# Patient Record
Sex: FEMALE | Race: WHITE | Employment: FULL TIME | URBAN - METROPOLITAN AREA
[De-identification: names, ages, dates, MRNs, and addresses within clinical notes are randomized per-mention and may not be internally consistent; named-entity substitution may affect disease eponyms.]

---

## 2017-01-01 ENCOUNTER — HOSPITAL ENCOUNTER (OUTPATIENT)
Dept: INFUSION THERAPY | Age: 72
End: 2017-01-01
Payer: COMMERCIAL

## 2017-01-01 ENCOUNTER — HOSPITAL ENCOUNTER (OUTPATIENT)
Dept: INFUSION THERAPY | Age: 72
Discharge: HOME | End: 2017-10-04
Payer: COMMERCIAL

## 2017-01-01 ENCOUNTER — APPOINTMENT (OUTPATIENT)
Dept: INFUSION THERAPY | Age: 72
End: 2017-01-01
Payer: COMMERCIAL

## 2017-01-01 ENCOUNTER — HOME CARE VISIT (OUTPATIENT)
Dept: SCHEDULING | Facility: HOME HEALTH | Age: 72
End: 2017-01-01
Payer: COMMERCIAL

## 2017-01-01 ENCOUNTER — HOME CARE VISIT (OUTPATIENT)
Dept: HOSPICE | Facility: HOSPICE | Age: 72
End: 2017-01-01
Payer: COMMERCIAL

## 2017-01-01 ENCOUNTER — HOSPITAL ENCOUNTER (OUTPATIENT)
Dept: INFUSION THERAPY | Age: 72
Discharge: HOME | End: 2017-03-17
Payer: COMMERCIAL

## 2017-01-01 ENCOUNTER — OFFICE VISIT (OUTPATIENT)
Dept: ONCOLOGY | Age: 72
End: 2017-01-01

## 2017-01-01 ENCOUNTER — DOCUMENTATION ONLY (OUTPATIENT)
Dept: ONCOLOGY | Age: 72
End: 2017-01-01

## 2017-01-01 ENCOUNTER — TELEPHONE (OUTPATIENT)
Dept: ONCOLOGY | Age: 72
End: 2017-01-01

## 2017-01-01 ENCOUNTER — HOSPITAL ENCOUNTER (OUTPATIENT)
Dept: INFUSION THERAPY | Age: 72
Discharge: HOME | End: 2017-02-22
Payer: COMMERCIAL

## 2017-01-01 ENCOUNTER — APPOINTMENT (OUTPATIENT)
Dept: GENERAL RADIOLOGY | Age: 72
DRG: 208 | End: 2017-01-01
Attending: INTERNAL MEDICINE
Payer: COMMERCIAL

## 2017-01-01 ENCOUNTER — HOSPITAL ENCOUNTER (OUTPATIENT)
Dept: INFUSION THERAPY | Age: 72
Discharge: HOME | End: 2017-08-01
Payer: COMMERCIAL

## 2017-01-01 ENCOUNTER — HOSPITAL ENCOUNTER (OUTPATIENT)
Dept: CT IMAGING | Age: 72
Discharge: HOME | End: 2017-01-23
Attending: INTERNAL MEDICINE
Payer: COMMERCIAL

## 2017-01-01 ENCOUNTER — HOSPITAL ENCOUNTER (OUTPATIENT)
Dept: INFUSION THERAPY | Age: 72
Discharge: HOME | End: 2017-04-26
Payer: COMMERCIAL

## 2017-01-01 ENCOUNTER — HOSPITAL ENCOUNTER (OUTPATIENT)
Dept: MRI IMAGING | Age: 72
Discharge: HOME | End: 2017-05-05
Attending: INTERNAL MEDICINE
Payer: COMMERCIAL

## 2017-01-01 ENCOUNTER — HOSPITAL ENCOUNTER (OUTPATIENT)
Dept: INFUSION THERAPY | Age: 72
Discharge: HOME | End: 2017-01-27
Payer: COMMERCIAL

## 2017-01-01 ENCOUNTER — ANESTHESIA EVENT (OUTPATIENT)
Dept: SURGERY | Age: 72
DRG: 208 | End: 2017-01-01
Payer: COMMERCIAL

## 2017-01-01 ENCOUNTER — HOSPITAL ENCOUNTER (OUTPATIENT)
Dept: INFUSION THERAPY | Age: 72
Discharge: HOME | End: 2017-08-21
Payer: COMMERCIAL

## 2017-01-01 ENCOUNTER — HOSPITAL ENCOUNTER (OUTPATIENT)
Dept: MRI IMAGING | Age: 72
Discharge: HOME | End: 2017-07-14
Attending: INTERNAL MEDICINE
Payer: COMMERCIAL

## 2017-01-01 ENCOUNTER — HOSPITAL ENCOUNTER (OUTPATIENT)
Dept: INFUSION THERAPY | Age: 72
Discharge: HOME | End: 2017-08-29
Payer: COMMERCIAL

## 2017-01-01 ENCOUNTER — HOSPITAL ENCOUNTER (OUTPATIENT)
Dept: INFUSION THERAPY | Age: 72
Discharge: HOME | End: 2017-05-24
Payer: COMMERCIAL

## 2017-01-01 ENCOUNTER — APPOINTMENT (OUTPATIENT)
Dept: GENERAL RADIOLOGY | Age: 72
DRG: 208 | End: 2017-01-01
Attending: EMERGENCY MEDICINE
Payer: COMMERCIAL

## 2017-01-01 ENCOUNTER — ANESTHESIA (OUTPATIENT)
Dept: SURGERY | Age: 72
DRG: 208 | End: 2017-01-01
Payer: COMMERCIAL

## 2017-01-01 ENCOUNTER — HOSPITAL ENCOUNTER (OUTPATIENT)
Dept: INFUSION THERAPY | Age: 72
Discharge: HOME | End: 2017-04-28
Payer: COMMERCIAL

## 2017-01-01 ENCOUNTER — APPOINTMENT (OUTPATIENT)
Dept: MRI IMAGING | Age: 72
DRG: 208 | End: 2017-01-01
Attending: OTOLARYNGOLOGY
Payer: COMMERCIAL

## 2017-01-01 ENCOUNTER — HOSPITAL ENCOUNTER (OUTPATIENT)
Dept: VASCULAR SURGERY | Age: 72
Discharge: HOME | End: 2017-05-05
Attending: INTERNAL MEDICINE
Payer: COMMERCIAL

## 2017-01-01 ENCOUNTER — HOSPITAL ENCOUNTER (OUTPATIENT)
Dept: INFUSION THERAPY | Age: 72
Discharge: HOME | End: 2017-03-24
Payer: COMMERCIAL

## 2017-01-01 ENCOUNTER — HOSPITAL ENCOUNTER (OUTPATIENT)
Dept: INFUSION THERAPY | Age: 72
Discharge: HOME | End: 2017-06-06
Payer: COMMERCIAL

## 2017-01-01 ENCOUNTER — HOSPITAL ENCOUNTER (OUTPATIENT)
Dept: INFUSION THERAPY | Age: 72
Discharge: HOME | End: 2017-02-10
Payer: COMMERCIAL

## 2017-01-01 ENCOUNTER — HOSPITAL ENCOUNTER (OUTPATIENT)
Dept: INFUSION THERAPY | Age: 72
Discharge: HOME | End: 2017-02-08
Payer: COMMERCIAL

## 2017-01-01 ENCOUNTER — HOSPITAL ENCOUNTER (OUTPATIENT)
Dept: INFUSION THERAPY | Age: 72
Discharge: HOME | End: 2017-06-14
Payer: COMMERCIAL

## 2017-01-01 ENCOUNTER — HOSPITAL ENCOUNTER (OUTPATIENT)
Dept: INFUSION THERAPY | Age: 72
Discharge: HOME | End: 2017-03-08
Payer: COMMERCIAL

## 2017-01-01 ENCOUNTER — HOSPITAL ENCOUNTER (INPATIENT)
Age: 72
LOS: 1 days | DRG: 951 | End: 2017-10-12
Attending: INTERNAL MEDICINE | Admitting: INTERNAL MEDICINE
Payer: OTHER MISCELLANEOUS

## 2017-01-01 ENCOUNTER — HOSPITAL ENCOUNTER (OUTPATIENT)
Dept: INFUSION THERAPY | Age: 72
Discharge: HOME | End: 2017-05-12
Payer: COMMERCIAL

## 2017-01-01 ENCOUNTER — HOSPITAL ENCOUNTER (OUTPATIENT)
Dept: INFUSION THERAPY | Age: 72
Discharge: HOME | End: 2017-04-05
Payer: COMMERCIAL

## 2017-01-01 ENCOUNTER — APPOINTMENT (OUTPATIENT)
Dept: INFUSION THERAPY | Age: 72
End: 2017-01-01

## 2017-01-01 ENCOUNTER — HOSPITAL ENCOUNTER (OUTPATIENT)
Dept: INFUSION THERAPY | Age: 72
Discharge: HOME | End: 2017-02-24
Payer: COMMERCIAL

## 2017-01-01 ENCOUNTER — HOSPITAL ENCOUNTER (OUTPATIENT)
Dept: INFUSION THERAPY | Age: 72
Discharge: HOME | End: 2017-01-11
Payer: COMMERCIAL

## 2017-01-01 ENCOUNTER — HOSPITAL ENCOUNTER (OUTPATIENT)
Dept: INFUSION THERAPY | Age: 72
Discharge: HOME | End: 2017-01-25
Payer: COMMERCIAL

## 2017-01-01 ENCOUNTER — HOSPITAL ENCOUNTER (OUTPATIENT)
Age: 72
Setting detail: OBSERVATION
Discharge: HOME | End: 2017-03-15
Attending: EMERGENCY MEDICINE | Admitting: INTERNAL MEDICINE
Payer: COMMERCIAL

## 2017-01-01 ENCOUNTER — HOSPITAL ENCOUNTER (INPATIENT)
Age: 72
LOS: 5 days | Discharge: HOME | DRG: 208 | End: 2017-04-10
Attending: EMERGENCY MEDICINE | Admitting: HOSPITALIST
Payer: COMMERCIAL

## 2017-01-01 ENCOUNTER — HOSPITAL ENCOUNTER (OUTPATIENT)
Dept: INFUSION THERAPY | Age: 72
Discharge: HOME | End: 2017-03-22
Payer: COMMERCIAL

## 2017-01-01 ENCOUNTER — HOSPITAL ENCOUNTER (OUTPATIENT)
Dept: INFUSION THERAPY | Age: 72
Discharge: HOME | End: 2017-05-10
Payer: COMMERCIAL

## 2017-01-01 ENCOUNTER — HOSPITAL ENCOUNTER (OUTPATIENT)
Dept: INFUSION THERAPY | Age: 72
Discharge: HOME | End: 2017-01-13
Payer: COMMERCIAL

## 2017-01-01 ENCOUNTER — HOSPICE ADMISSION (OUTPATIENT)
Dept: HOSPICE | Facility: HOSPICE | Age: 72
End: 2017-01-01
Payer: COMMERCIAL

## 2017-01-01 ENCOUNTER — HOSPITAL ENCOUNTER (OUTPATIENT)
Dept: INFUSION THERAPY | Age: 72
Discharge: HOME | End: 2017-03-15
Payer: COMMERCIAL

## 2017-01-01 VITALS
DIASTOLIC BLOOD PRESSURE: 70 MMHG | TEMPERATURE: 98.3 F | SYSTOLIC BLOOD PRESSURE: 112 MMHG | RESPIRATION RATE: 18 BRPM | HEART RATE: 85 BPM

## 2017-01-01 VITALS
SYSTOLIC BLOOD PRESSURE: 113 MMHG | DIASTOLIC BLOOD PRESSURE: 70 MMHG | HEIGHT: 65 IN | BODY MASS INDEX: 24.99 KG/M2 | WEIGHT: 150 LBS | HEART RATE: 89 BPM | RESPIRATION RATE: 18 BRPM | OXYGEN SATURATION: 94 % | TEMPERATURE: 98 F

## 2017-01-01 VITALS
HEIGHT: 65 IN | HEART RATE: 68 BPM | DIASTOLIC BLOOD PRESSURE: 76 MMHG | BODY MASS INDEX: 27.66 KG/M2 | WEIGHT: 166 LBS | TEMPERATURE: 97.5 F | RESPIRATION RATE: 18 BRPM | OXYGEN SATURATION: 98 % | SYSTOLIC BLOOD PRESSURE: 123 MMHG

## 2017-01-01 VITALS
DIASTOLIC BLOOD PRESSURE: 68 MMHG | HEIGHT: 65 IN | BODY MASS INDEX: 24.49 KG/M2 | RESPIRATION RATE: 16 BRPM | SYSTOLIC BLOOD PRESSURE: 112 MMHG | OXYGEN SATURATION: 98 % | WEIGHT: 147 LBS | HEART RATE: 92 BPM

## 2017-01-01 VITALS
HEART RATE: 90 BPM | RESPIRATION RATE: 18 BRPM | SYSTOLIC BLOOD PRESSURE: 119 MMHG | HEART RATE: 87 BPM | DIASTOLIC BLOOD PRESSURE: 78 MMHG | DIASTOLIC BLOOD PRESSURE: 59 MMHG | OXYGEN SATURATION: 97 % | SYSTOLIC BLOOD PRESSURE: 101 MMHG | TEMPERATURE: 97.4 F | RESPIRATION RATE: 18 BRPM | TEMPERATURE: 97.6 F | OXYGEN SATURATION: 98 %

## 2017-01-01 VITALS
SYSTOLIC BLOOD PRESSURE: 114 MMHG | HEART RATE: 74 BPM | WEIGHT: 160.1 LBS | DIASTOLIC BLOOD PRESSURE: 82 MMHG | BODY MASS INDEX: 28.16 KG/M2 | DIASTOLIC BLOOD PRESSURE: 76 MMHG | BODY MASS INDEX: 26.64 KG/M2 | OXYGEN SATURATION: 96 % | SYSTOLIC BLOOD PRESSURE: 126 MMHG | WEIGHT: 169 LBS | OXYGEN SATURATION: 99 % | HEIGHT: 65 IN | RESPIRATION RATE: 18 BRPM | TEMPERATURE: 97.4 F | HEART RATE: 82 BPM | TEMPERATURE: 98 F | RESPIRATION RATE: 18 BRPM

## 2017-01-01 VITALS
WEIGHT: 163.6 LBS | HEIGHT: 65 IN | TEMPERATURE: 97.6 F | RESPIRATION RATE: 18 BRPM | OXYGEN SATURATION: 99 % | BODY MASS INDEX: 27.26 KG/M2 | DIASTOLIC BLOOD PRESSURE: 72 MMHG | HEART RATE: 84 BPM | SYSTOLIC BLOOD PRESSURE: 111 MMHG

## 2017-01-01 VITALS
HEART RATE: 90 BPM | OXYGEN SATURATION: 98 % | TEMPERATURE: 97.3 F | RESPIRATION RATE: 18 BRPM | SYSTOLIC BLOOD PRESSURE: 108 MMHG | DIASTOLIC BLOOD PRESSURE: 42 MMHG

## 2017-01-01 VITALS
HEIGHT: 65 IN | HEART RATE: 93 BPM | DIASTOLIC BLOOD PRESSURE: 86 MMHG | BODY MASS INDEX: 28.07 KG/M2 | WEIGHT: 168.5 LBS | RESPIRATION RATE: 18 BRPM | SYSTOLIC BLOOD PRESSURE: 143 MMHG

## 2017-01-01 VITALS
HEART RATE: 90 BPM | WEIGHT: 163.4 LBS | BODY MASS INDEX: 27.22 KG/M2 | DIASTOLIC BLOOD PRESSURE: 70 MMHG | HEIGHT: 65 IN | OXYGEN SATURATION: 94 % | SYSTOLIC BLOOD PRESSURE: 106 MMHG | TEMPERATURE: 98.1 F

## 2017-01-01 VITALS
DIASTOLIC BLOOD PRESSURE: 77 MMHG | TEMPERATURE: 97.1 F | RESPIRATION RATE: 18 BRPM | HEART RATE: 95 BPM | SYSTOLIC BLOOD PRESSURE: 119 MMHG

## 2017-01-01 VITALS
OXYGEN SATURATION: 97 % | HEIGHT: 65 IN | DIASTOLIC BLOOD PRESSURE: 70 MMHG | RESPIRATION RATE: 18 BRPM | TEMPERATURE: 98.1 F | HEART RATE: 70 BPM | WEIGHT: 155.6 LBS | BODY MASS INDEX: 25.92 KG/M2 | SYSTOLIC BLOOD PRESSURE: 110 MMHG

## 2017-01-01 VITALS
HEIGHT: 65 IN | OXYGEN SATURATION: 98 % | RESPIRATION RATE: 18 BRPM | BODY MASS INDEX: 26.16 KG/M2 | TEMPERATURE: 97.9 F | DIASTOLIC BLOOD PRESSURE: 79 MMHG | HEART RATE: 81 BPM | SYSTOLIC BLOOD PRESSURE: 121 MMHG | WEIGHT: 157 LBS

## 2017-01-01 VITALS
OXYGEN SATURATION: 97 % | HEART RATE: 74 BPM | TEMPERATURE: 98.4 F | RESPIRATION RATE: 18 BRPM | DIASTOLIC BLOOD PRESSURE: 62 MMHG | SYSTOLIC BLOOD PRESSURE: 98 MMHG

## 2017-01-01 VITALS
DIASTOLIC BLOOD PRESSURE: 86 MMHG | WEIGHT: 162.5 LBS | HEIGHT: 65 IN | OXYGEN SATURATION: 96 % | HEART RATE: 104 BPM | RESPIRATION RATE: 18 BRPM | BODY MASS INDEX: 27.07 KG/M2 | TEMPERATURE: 97.1 F | SYSTOLIC BLOOD PRESSURE: 132 MMHG

## 2017-01-01 VITALS
HEIGHT: 64 IN | HEART RATE: 71 BPM | RESPIRATION RATE: 18 BRPM | SYSTOLIC BLOOD PRESSURE: 124 MMHG | BODY MASS INDEX: 28.85 KG/M2 | DIASTOLIC BLOOD PRESSURE: 83 MMHG | TEMPERATURE: 97.8 F | WEIGHT: 169 LBS | OXYGEN SATURATION: 98 %

## 2017-01-01 VITALS
HEART RATE: 101 BPM | WEIGHT: 165.7 LBS | SYSTOLIC BLOOD PRESSURE: 126 MMHG | BODY MASS INDEX: 27.61 KG/M2 | RESPIRATION RATE: 18 BRPM | OXYGEN SATURATION: 96 % | DIASTOLIC BLOOD PRESSURE: 73 MMHG | HEIGHT: 65 IN | TEMPERATURE: 97 F

## 2017-01-01 VITALS
OXYGEN SATURATION: 94 % | HEART RATE: 74 BPM | SYSTOLIC BLOOD PRESSURE: 100 MMHG | DIASTOLIC BLOOD PRESSURE: 64 MMHG | RESPIRATION RATE: 18 BRPM

## 2017-01-01 VITALS
OXYGEN SATURATION: 95 % | DIASTOLIC BLOOD PRESSURE: 62 MMHG | HEART RATE: 93 BPM | SYSTOLIC BLOOD PRESSURE: 102 MMHG | RESPIRATION RATE: 18 BRPM

## 2017-01-01 VITALS
DIASTOLIC BLOOD PRESSURE: 70 MMHG | TEMPERATURE: 97.2 F | RESPIRATION RATE: 18 BRPM | SYSTOLIC BLOOD PRESSURE: 103 MMHG | OXYGEN SATURATION: 98 % | HEART RATE: 70 BPM

## 2017-01-01 VITALS
DIASTOLIC BLOOD PRESSURE: 78 MMHG | SYSTOLIC BLOOD PRESSURE: 115 MMHG | BODY MASS INDEX: 26.16 KG/M2 | OXYGEN SATURATION: 97 % | RESPIRATION RATE: 18 BRPM | WEIGHT: 157 LBS | HEIGHT: 65 IN | HEART RATE: 72 BPM | TEMPERATURE: 97.6 F

## 2017-01-01 VITALS
OXYGEN SATURATION: 97 % | HEART RATE: 71 BPM | SYSTOLIC BLOOD PRESSURE: 108 MMHG | DIASTOLIC BLOOD PRESSURE: 64 MMHG | RESPIRATION RATE: 18 BRPM | TEMPERATURE: 97.3 F

## 2017-01-01 VITALS
RESPIRATION RATE: 16 BRPM | SYSTOLIC BLOOD PRESSURE: 106 MMHG | TEMPERATURE: 97.7 F | OXYGEN SATURATION: 96 % | HEART RATE: 101 BPM | DIASTOLIC BLOOD PRESSURE: 68 MMHG

## 2017-01-01 VITALS
RESPIRATION RATE: 18 BRPM | SYSTOLIC BLOOD PRESSURE: 105 MMHG | TEMPERATURE: 96.9 F | DIASTOLIC BLOOD PRESSURE: 65 MMHG | HEART RATE: 65 BPM

## 2017-01-01 VITALS
OXYGEN SATURATION: 97 % | HEART RATE: 77 BPM | BODY MASS INDEX: 27.51 KG/M2 | RESPIRATION RATE: 18 BRPM | DIASTOLIC BLOOD PRESSURE: 72 MMHG | TEMPERATURE: 97.6 F | SYSTOLIC BLOOD PRESSURE: 112 MMHG | WEIGHT: 165.34 LBS

## 2017-01-01 VITALS
TEMPERATURE: 98.7 F | OXYGEN SATURATION: 97 % | HEIGHT: 65 IN | WEIGHT: 147.4 LBS | SYSTOLIC BLOOD PRESSURE: 113 MMHG | BODY MASS INDEX: 24.56 KG/M2 | HEART RATE: 88 BPM | RESPIRATION RATE: 18 BRPM | DIASTOLIC BLOOD PRESSURE: 76 MMHG

## 2017-01-01 VITALS
OXYGEN SATURATION: 96 % | WEIGHT: 155.6 LBS | BODY MASS INDEX: 25.92 KG/M2 | TEMPERATURE: 97.9 F | DIASTOLIC BLOOD PRESSURE: 76 MMHG | HEIGHT: 65 IN | RESPIRATION RATE: 18 BRPM | SYSTOLIC BLOOD PRESSURE: 114 MMHG

## 2017-01-01 VITALS
DIASTOLIC BLOOD PRESSURE: 53 MMHG | TEMPERATURE: 97.2 F | RESPIRATION RATE: 20 BRPM | HEART RATE: 91 BPM | SYSTOLIC BLOOD PRESSURE: 100 MMHG

## 2017-01-01 VITALS
SYSTOLIC BLOOD PRESSURE: 123 MMHG | DIASTOLIC BLOOD PRESSURE: 74 MMHG | RESPIRATION RATE: 20 BRPM | HEART RATE: 80 BPM | OXYGEN SATURATION: 98 %

## 2017-01-01 VITALS
HEART RATE: 72 BPM | SYSTOLIC BLOOD PRESSURE: 126 MMHG | OXYGEN SATURATION: 95 % | RESPIRATION RATE: 16 BRPM | DIASTOLIC BLOOD PRESSURE: 60 MMHG

## 2017-01-01 VITALS
RESPIRATION RATE: 18 BRPM | WEIGHT: 148.8 LBS | BODY MASS INDEX: 24.79 KG/M2 | HEART RATE: 91 BPM | TEMPERATURE: 97.6 F | OXYGEN SATURATION: 98 % | SYSTOLIC BLOOD PRESSURE: 117 MMHG | DIASTOLIC BLOOD PRESSURE: 76 MMHG | HEIGHT: 65 IN

## 2017-01-01 VITALS
DIASTOLIC BLOOD PRESSURE: 103 MMHG | SYSTOLIC BLOOD PRESSURE: 180 MMHG | BODY MASS INDEX: 26.33 KG/M2 | HEART RATE: 105 BPM | WEIGHT: 158 LBS | TEMPERATURE: 97.8 F | OXYGEN SATURATION: 92 % | HEIGHT: 65 IN | RESPIRATION RATE: 22 BRPM

## 2017-01-01 VITALS
RESPIRATION RATE: 16 BRPM | BODY MASS INDEX: 25.22 KG/M2 | OXYGEN SATURATION: 96 % | HEIGHT: 65 IN | SYSTOLIC BLOOD PRESSURE: 109 MMHG | WEIGHT: 151.4 LBS | TEMPERATURE: 98.5 F | DIASTOLIC BLOOD PRESSURE: 73 MMHG | HEART RATE: 78 BPM

## 2017-01-01 VITALS
SYSTOLIC BLOOD PRESSURE: 110 MMHG | OXYGEN SATURATION: 99 % | RESPIRATION RATE: 20 BRPM | HEART RATE: 88 BPM | DIASTOLIC BLOOD PRESSURE: 62 MMHG

## 2017-01-01 VITALS
OXYGEN SATURATION: 97 % | BODY MASS INDEX: 26.87 KG/M2 | RESPIRATION RATE: 18 BRPM | HEIGHT: 64 IN | WEIGHT: 157.41 LBS | TEMPERATURE: 98.2 F | DIASTOLIC BLOOD PRESSURE: 60 MMHG | SYSTOLIC BLOOD PRESSURE: 102 MMHG | HEART RATE: 70 BPM

## 2017-01-01 VITALS
TEMPERATURE: 97.4 F | RESPIRATION RATE: 20 BRPM | DIASTOLIC BLOOD PRESSURE: 70 MMHG | SYSTOLIC BLOOD PRESSURE: 99 MMHG | HEART RATE: 98 BPM

## 2017-01-01 VITALS
WEIGHT: 160 LBS | HEIGHT: 65 IN | BODY MASS INDEX: 26.66 KG/M2 | HEART RATE: 77 BPM | OXYGEN SATURATION: 98 % | DIASTOLIC BLOOD PRESSURE: 84 MMHG | RESPIRATION RATE: 18 BRPM | SYSTOLIC BLOOD PRESSURE: 136 MMHG | TEMPERATURE: 98 F

## 2017-01-01 VITALS
RESPIRATION RATE: 16 BRPM | HEIGHT: 65 IN | SYSTOLIC BLOOD PRESSURE: 103 MMHG | WEIGHT: 149 LBS | OXYGEN SATURATION: 99 % | TEMPERATURE: 98.3 F | HEART RATE: 76 BPM | DIASTOLIC BLOOD PRESSURE: 71 MMHG | BODY MASS INDEX: 24.83 KG/M2

## 2017-01-01 VITALS
HEART RATE: 71 BPM | DIASTOLIC BLOOD PRESSURE: 72 MMHG | SYSTOLIC BLOOD PRESSURE: 112 MMHG | RESPIRATION RATE: 18 BRPM | TEMPERATURE: 96.8 F

## 2017-01-01 VITALS
TEMPERATURE: 97.5 F | HEART RATE: 75 BPM | DIASTOLIC BLOOD PRESSURE: 73 MMHG | OXYGEN SATURATION: 98 % | RESPIRATION RATE: 18 BRPM | SYSTOLIC BLOOD PRESSURE: 112 MMHG

## 2017-01-01 VITALS
HEIGHT: 65 IN | WEIGHT: 165 LBS | OXYGEN SATURATION: 97 % | DIASTOLIC BLOOD PRESSURE: 81 MMHG | BODY MASS INDEX: 27.49 KG/M2 | HEART RATE: 72 BPM | SYSTOLIC BLOOD PRESSURE: 116 MMHG | TEMPERATURE: 98.2 F | RESPIRATION RATE: 18 BRPM

## 2017-01-01 VITALS
DIASTOLIC BLOOD PRESSURE: 58 MMHG | SYSTOLIC BLOOD PRESSURE: 98 MMHG | OXYGEN SATURATION: 95 % | RESPIRATION RATE: 15 BRPM | TEMPERATURE: 97.7 F | HEART RATE: 89 BPM

## 2017-01-01 VITALS
SYSTOLIC BLOOD PRESSURE: 115 MMHG | RESPIRATION RATE: 18 BRPM | OXYGEN SATURATION: 97 % | DIASTOLIC BLOOD PRESSURE: 65 MMHG | HEART RATE: 78 BPM

## 2017-01-01 VITALS
DIASTOLIC BLOOD PRESSURE: 77 MMHG | RESPIRATION RATE: 18 BRPM | HEART RATE: 82 BPM | TEMPERATURE: 97.9 F | SYSTOLIC BLOOD PRESSURE: 137 MMHG | OXYGEN SATURATION: 96 %

## 2017-01-01 VITALS
WEIGHT: 169.2 LBS | HEIGHT: 65 IN | HEART RATE: 61 BPM | RESPIRATION RATE: 18 BRPM | TEMPERATURE: 97.7 F | BODY MASS INDEX: 28.19 KG/M2 | OXYGEN SATURATION: 97 % | DIASTOLIC BLOOD PRESSURE: 77 MMHG | SYSTOLIC BLOOD PRESSURE: 121 MMHG

## 2017-01-01 DIAGNOSIS — R10.11 RIGHT UPPER QUADRANT ABDOMINAL PAIN: ICD-10-CM

## 2017-01-01 DIAGNOSIS — R74.8 ELEVATED LIVER ENZYMES: ICD-10-CM

## 2017-01-01 DIAGNOSIS — C78.7 METASTATIC COLON CANCER TO LIVER (HCC): Primary | ICD-10-CM

## 2017-01-01 DIAGNOSIS — R21 FACIAL RASH: ICD-10-CM

## 2017-01-01 DIAGNOSIS — C18.9 METASTATIC COLON CANCER IN FEMALE (HCC): Primary | ICD-10-CM

## 2017-01-01 DIAGNOSIS — R60.0 BILATERAL LOWER EXTREMITY EDEMA: ICD-10-CM

## 2017-01-01 DIAGNOSIS — E87.6 HYPOKALEMIA: ICD-10-CM

## 2017-01-01 DIAGNOSIS — T45.1X5A CHEMOTHERAPY INDUCED NEUTROPENIA (HCC): ICD-10-CM

## 2017-01-01 DIAGNOSIS — L27.0 DRUG-INDUCED SKIN RASH: ICD-10-CM

## 2017-01-01 DIAGNOSIS — C18.9 METASTATIC COLON CANCER IN FEMALE (HCC): ICD-10-CM

## 2017-01-01 DIAGNOSIS — K72.00 ACUTE LIVER FAILURE WITHOUT HEPATIC COMA: ICD-10-CM

## 2017-01-01 DIAGNOSIS — R06.1 INSPIRATORY STRIDOR: ICD-10-CM

## 2017-01-01 DIAGNOSIS — F41.9 ANXIETY: ICD-10-CM

## 2017-01-01 DIAGNOSIS — T78.2XXA ANAPHYLACTIC REACTION, INITIAL ENCOUNTER: Primary | ICD-10-CM

## 2017-01-01 DIAGNOSIS — C18.9 MALIGNANT NEOPLASM OF COLON, UNSPECIFIED PART OF COLON (HCC): Primary | ICD-10-CM

## 2017-01-01 DIAGNOSIS — R10.11 RUQ PAIN: ICD-10-CM

## 2017-01-01 DIAGNOSIS — C18.9 METASTATIC COLON CANCER TO LIVER (HCC): Primary | ICD-10-CM

## 2017-01-01 DIAGNOSIS — L03.211 CELLULITIS OF FACE: ICD-10-CM

## 2017-01-01 DIAGNOSIS — D70.1 CHEMOTHERAPY INDUCED NEUTROPENIA (HCC): ICD-10-CM

## 2017-01-01 DIAGNOSIS — R09.81 NASAL CONGESTION: ICD-10-CM

## 2017-01-01 DIAGNOSIS — R21 RASH AND OTHER NONSPECIFIC SKIN ERUPTION: ICD-10-CM

## 2017-01-01 DIAGNOSIS — T78.40XA ALLERGIC REACTION, INITIAL ENCOUNTER: Primary | ICD-10-CM

## 2017-01-01 DIAGNOSIS — N39.0 URINARY TRACT INFECTION WITHOUT HEMATURIA, SITE UNSPECIFIED: Primary | ICD-10-CM

## 2017-01-01 LAB
ABO + RH BLD: NORMAL
ALBUMIN SERPL BCP-MCNC: 2.9 G/DL (ref 3.5–5)
ALBUMIN SERPL BCP-MCNC: 2.9 G/DL (ref 3.5–5)
ALBUMIN SERPL BCP-MCNC: 3.1 G/DL (ref 3.5–5)
ALBUMIN SERPL BCP-MCNC: 3.2 G/DL (ref 3.5–5)
ALBUMIN SERPL BCP-MCNC: 3.3 G/DL (ref 3.5–5)
ALBUMIN SERPL BCP-MCNC: 3.4 G/DL (ref 3.5–5)
ALBUMIN SERPL BCP-MCNC: 3.5 G/DL (ref 3.5–5)
ALBUMIN SERPL BCP-MCNC: 3.7 G/DL (ref 3.5–5)
ALBUMIN SERPL-MCNC: 3.1 G/DL (ref 3.5–5)
ALBUMIN SERPL-MCNC: 3.4 G/DL (ref 3.5–5)
ALBUMIN SERPL-MCNC: 4.2 G/DL (ref 3.5–4.8)
ALBUMIN/GLOB SERPL: 0.6 {RATIO} (ref 1.1–2.2)
ALBUMIN/GLOB SERPL: 0.7 {RATIO} (ref 1.1–2.2)
ALBUMIN/GLOB SERPL: 0.8 {RATIO} (ref 1.1–2.2)
ALBUMIN/GLOB SERPL: 0.9 {RATIO} (ref 1.1–2.2)
ALBUMIN/GLOB SERPL: 1.3 {RATIO} (ref 1.2–2.2)
ALP SERPL-CCNC: 137 U/L (ref 45–117)
ALP SERPL-CCNC: 139 U/L (ref 45–117)
ALP SERPL-CCNC: 152 U/L (ref 45–117)
ALP SERPL-CCNC: 165 U/L (ref 45–117)
ALP SERPL-CCNC: 172 U/L (ref 45–117)
ALP SERPL-CCNC: 182 U/L (ref 45–117)
ALP SERPL-CCNC: 245 IU/L (ref 39–117)
ALP SERPL-CCNC: 423 U/L (ref 45–117)
ALP SERPL-CCNC: 503 U/L (ref 45–117)
ALP SERPL-CCNC: 629 U/L (ref 45–117)
ALP SERPL-CCNC: 688 U/L (ref 45–117)
ALT SERPL-CCNC: 118 U/L (ref 12–78)
ALT SERPL-CCNC: 146 U/L (ref 12–78)
ALT SERPL-CCNC: 157 U/L (ref 12–78)
ALT SERPL-CCNC: 24 IU/L (ref 0–32)
ALT SERPL-CCNC: 24 U/L (ref 12–78)
ALT SERPL-CCNC: 25 U/L (ref 12–78)
ALT SERPL-CCNC: 26 U/L (ref 12–78)
ALT SERPL-CCNC: 28 U/L (ref 12–78)
ALT SERPL-CCNC: 34 U/L (ref 12–78)
ALT SERPL-CCNC: 44 U/L (ref 12–78)
ALT SERPL-CCNC: 52 U/L (ref 12–78)
ANION GAP BLD CALC-SCNC: 10 MMOL/L (ref 5–15)
ANION GAP BLD CALC-SCNC: 10 MMOL/L (ref 5–15)
ANION GAP BLD CALC-SCNC: 11 MMOL/L (ref 5–15)
ANION GAP BLD CALC-SCNC: 11 MMOL/L (ref 5–15)
ANION GAP BLD CALC-SCNC: 12 MMOL/L (ref 5–15)
ANION GAP BLD CALC-SCNC: 12 MMOL/L (ref 5–15)
ANION GAP BLD CALC-SCNC: 14 MMOL/L (ref 5–15)
ANION GAP BLD CALC-SCNC: 17 MMOL/L (ref 5–15)
ANION GAP BLD CALC-SCNC: 18 MMOL/L (ref 5–15)
ANION GAP BLD CALC-SCNC: 19 MMOL/L (ref 5–15)
ANION GAP BLD CALC-SCNC: 19 MMOL/L (ref 5–15)
ANION GAP BLD CALC-SCNC: 4 MMOL/L (ref 5–15)
ANION GAP BLD CALC-SCNC: 6 MMOL/L (ref 5–15)
ANION GAP BLD CALC-SCNC: 6 MMOL/L (ref 5–15)
ANION GAP BLD CALC-SCNC: 8 MMOL/L (ref 5–15)
ANION GAP BLD CALC-SCNC: 9 MMOL/L (ref 5–15)
ANION GAP BLD CALC-SCNC: 9 MMOL/L (ref 5–15)
ANION GAP SERPL CALC-SCNC: 4 MMOL/L (ref 5–15)
ANION GAP SERPL CALC-SCNC: 4 MMOL/L (ref 5–15)
APPEARANCE UR: ABNORMAL
APPEARANCE UR: CLEAR
ARTERIAL PATENCY WRIST A: ABNORMAL
ARTERIAL PATENCY WRIST A: YES
AST SERPL W P-5'-P-CCNC: 24 U/L (ref 15–37)
AST SERPL W P-5'-P-CCNC: 25 U/L (ref 15–37)
AST SERPL W P-5'-P-CCNC: 26 U/L (ref 15–37)
AST SERPL W P-5'-P-CCNC: 27 U/L (ref 15–37)
AST SERPL W P-5'-P-CCNC: 35 U/L (ref 15–37)
AST SERPL W P-5'-P-CCNC: 62 U/L (ref 15–37)
AST SERPL W P-5'-P-CCNC: 66 U/L (ref 15–37)
AST SERPL W P-5'-P-CCNC: 81 U/L (ref 15–37)
AST SERPL-CCNC: 118 U/L (ref 15–37)
AST SERPL-CCNC: 129 U/L (ref 15–37)
AST SERPL-CCNC: 33 IU/L (ref 0–40)
ATRIAL RATE: 98 BPM
BACTERIA SPEC CULT: NORMAL
BACTERIA URNS QL MICRO: ABNORMAL /HPF
BACTERIA URNS QL MICRO: ABNORMAL /HPF
BACTERIA URNS QL MICRO: NEGATIVE /HPF
BACTERIA URNS QL MICRO: NEGATIVE /HPF
BASE DEFICIT BLDA-SCNC: 0.4 MMOL/L
BASE EXCESS BLDA CALC-SCNC: 1.7 MMOL/L
BASE EXCESS BLDA CALC-SCNC: 3.1 MMOL/L
BASE EXCESS BLDA CALC-SCNC: 5.3 MMOL/L
BASE EXCESS BLDA CALC-SCNC: 6.6 MMOL/L
BASO+EOS+MONOS # BLD AUTO: 0.5 K/UL (ref 0.2–1.2)
BASO+EOS+MONOS # BLD AUTO: 0.5 K/UL (ref 0.2–1.2)
BASO+EOS+MONOS # BLD AUTO: 0.6 K/UL (ref 0.2–1.2)
BASO+EOS+MONOS # BLD AUTO: 0.7 K/UL (ref 0.2–1.2)
BASO+EOS+MONOS # BLD AUTO: 0.7 K/UL (ref 0.2–1.2)
BASO+EOS+MONOS # BLD AUTO: 0.8 K/UL (ref 0.2–1.2)
BASO+EOS+MONOS # BLD AUTO: 0.8 K/UL (ref 0.2–1.2)
BASO+EOS+MONOS # BLD AUTO: 11 % (ref 3.2–16.9)
BASO+EOS+MONOS # BLD AUTO: 12 % (ref 3.2–16.9)
BASO+EOS+MONOS # BLD AUTO: 13 % (ref 3.2–16.9)
BASO+EOS+MONOS # BLD AUTO: 13 % (ref 3.2–16.9)
BASO+EOS+MONOS # BLD AUTO: 15 % (ref 3.2–16.9)
BASO+EOS+MONOS # BLD AUTO: 8 % (ref 3.2–16.9)
BASO+EOS+MONOS # BLD AUTO: 9 % (ref 3.2–16.9)
BASOPHILS # BLD AUTO: 0 K/UL
BASOPHILS # BLD AUTO: 0 K/UL (ref 0–0.1)
BASOPHILS # BLD AUTO: 0 X10E3/UL (ref 0–0.2)
BASOPHILS # BLD AUTO: 0.1 K/UL (ref 0–0.1)
BASOPHILS # BLD: 0 %
BASOPHILS # BLD: 0 % (ref 0–1)
BASOPHILS # BLD: 0 K/UL (ref 0–0.1)
BASOPHILS # BLD: 2 % (ref 0–1)
BASOPHILS NFR BLD AUTO: 0 %
BASOPHILS NFR BLD: 0 % (ref 0–1)
BDY SITE: ABNORMAL
BILIRUB DIRECT SERPL-MCNC: <0.1 MG/DL (ref 0–0.2)
BILIRUB DIRECT SERPL-MCNC: <0.1 MG/DL (ref 0–0.2)
BILIRUB SERPL-MCNC: 0.4 MG/DL (ref 0.2–1)
BILIRUB SERPL-MCNC: 0.4 MG/DL (ref 0–1.2)
BILIRUB SERPL-MCNC: 0.5 MG/DL (ref 0.2–1)
BILIRUB SERPL-MCNC: 0.5 MG/DL (ref 0.2–1)
BILIRUB SERPL-MCNC: 0.8 MG/DL (ref 0.2–1)
BILIRUB SERPL-MCNC: 0.9 MG/DL (ref 0.2–1)
BILIRUB SERPL-MCNC: 3.9 MG/DL (ref 0.2–1)
BILIRUB SERPL-MCNC: 8 MG/DL (ref 0.2–1)
BILIRUB UR QL CFM: NEGATIVE
BILIRUB UR QL: NEGATIVE
BLOOD GROUP ANTIBODIES SERPL: NORMAL
BREATHS.SPONTANEOUS ON VENT: 15
BUN BLD-MCNC: 13 MG/DL (ref 9–20)
BUN BLD-MCNC: 8 MG/DL (ref 9–20)
BUN BLD-MCNC: <3 MG/DL (ref 9–20)
BUN BLD-MCNC: <3 MG/DL (ref 9–20)
BUN SERPL-MCNC: 10 MG/DL (ref 6–20)
BUN SERPL-MCNC: 11 MG/DL (ref 6–20)
BUN SERPL-MCNC: 3 MG/DL (ref 6–20)
BUN SERPL-MCNC: 4 MG/DL (ref 6–20)
BUN SERPL-MCNC: 4 MG/DL (ref 6–20)
BUN SERPL-MCNC: 5 MG/DL (ref 6–20)
BUN SERPL-MCNC: 5 MG/DL (ref 6–20)
BUN SERPL-MCNC: 6 MG/DL (ref 6–20)
BUN SERPL-MCNC: 7 MG/DL (ref 6–20)
BUN SERPL-MCNC: 8 MG/DL (ref 6–20)
BUN SERPL-MCNC: 8 MG/DL (ref 6–20)
BUN SERPL-MCNC: 9 MG/DL (ref 6–20)
BUN SERPL-MCNC: 9 MG/DL (ref 6–20)
BUN SERPL-MCNC: 9 MG/DL (ref 8–27)
BUN/CREAT SERPL: 11 (ref 12–20)
BUN/CREAT SERPL: 12 (ref 12–20)
BUN/CREAT SERPL: 12 (ref 12–20)
BUN/CREAT SERPL: 13 (ref 12–20)
BUN/CREAT SERPL: 13 (ref 12–28)
BUN/CREAT SERPL: 17 (ref 12–20)
BUN/CREAT SERPL: 19 (ref 12–20)
BUN/CREAT SERPL: 20 (ref 12–20)
BUN/CREAT SERPL: 21 (ref 12–20)
BUN/CREAT SERPL: 22 (ref 12–20)
BUN/CREAT SERPL: 5 (ref 12–20)
BUN/CREAT SERPL: 7 (ref 12–20)
BUN/CREAT SERPL: 8 (ref 12–20)
BUN/CREAT SERPL: 9 (ref 12–20)
CA-I BLD-MCNC: 1.08 MMOL/L (ref 1.12–1.32)
CA-I BLD-MCNC: 1.15 MMOL/L (ref 1.12–1.32)
CA-I BLD-MCNC: 1.18 MMOL/L (ref 1.12–1.32)
CA-I BLD-MCNC: 1.18 MMOL/L (ref 1.12–1.32)
CALCIUM SERPL-MCNC: 7.9 MG/DL (ref 8.5–10.1)
CALCIUM SERPL-MCNC: 8.1 MG/DL (ref 8.5–10.1)
CALCIUM SERPL-MCNC: 8.1 MG/DL (ref 8.5–10.1)
CALCIUM SERPL-MCNC: 8.3 MG/DL (ref 8.5–10.1)
CALCIUM SERPL-MCNC: 8.5 MG/DL (ref 8.5–10.1)
CALCIUM SERPL-MCNC: 8.5 MG/DL (ref 8.5–10.1)
CALCIUM SERPL-MCNC: 8.7 MG/DL (ref 8.5–10.1)
CALCIUM SERPL-MCNC: 8.7 MG/DL (ref 8.5–10.1)
CALCIUM SERPL-MCNC: 8.9 MG/DL (ref 8.5–10.1)
CALCIUM SERPL-MCNC: 9 MG/DL (ref 8.5–10.1)
CALCIUM SERPL-MCNC: 9.1 MG/DL (ref 8.5–10.1)
CALCIUM SERPL-MCNC: 9.2 MG/DL (ref 8.5–10.1)
CALCIUM SERPL-MCNC: 9.5 MG/DL (ref 8.5–10.1)
CALCIUM SERPL-MCNC: 9.6 MG/DL (ref 8.7–10.3)
CALCULATED P AXIS, ECG09: 12 DEGREES
CALCULATED R AXIS, ECG10: 24 DEGREES
CALCULATED T AXIS, ECG11: 20 DEGREES
CC UR VC: NORMAL
CHLORIDE BLD-SCNC: 100 MMOL/L (ref 98–107)
CHLORIDE BLD-SCNC: 101 MMOL/L (ref 98–107)
CHLORIDE BLD-SCNC: 104 MMOL/L (ref 98–107)
CHLORIDE BLD-SCNC: 98 MMOL/L (ref 98–107)
CHLORIDE SERPL-SCNC: 100 MMOL/L (ref 97–108)
CHLORIDE SERPL-SCNC: 101 MMOL/L (ref 97–108)
CHLORIDE SERPL-SCNC: 102 MMOL/L (ref 97–108)
CHLORIDE SERPL-SCNC: 102 MMOL/L (ref 97–108)
CHLORIDE SERPL-SCNC: 105 MMOL/L (ref 97–108)
CHLORIDE SERPL-SCNC: 106 MMOL/L (ref 97–108)
CHLORIDE SERPL-SCNC: 106 MMOL/L (ref 97–108)
CHLORIDE SERPL-SCNC: 107 MMOL/L (ref 97–108)
CHLORIDE SERPL-SCNC: 108 MMOL/L (ref 97–108)
CHLORIDE SERPL-SCNC: 109 MMOL/L (ref 97–108)
CHLORIDE SERPL-SCNC: 113 MMOL/L (ref 97–108)
CHLORIDE SERPL-SCNC: 98 MMOL/L (ref 96–106)
CK SERPL-CCNC: 59 U/L (ref 26–192)
CO2 BLD-SCNC: 27 MMOL/L (ref 21–32)
CO2 BLD-SCNC: 27 MMOL/L (ref 21–32)
CO2 BLD-SCNC: 28 MMOL/L (ref 21–32)
CO2 BLD-SCNC: 29 MMOL/L (ref 21–32)
CO2 SERPL-SCNC: 23 MMOL/L (ref 21–32)
CO2 SERPL-SCNC: 24 MMOL/L (ref 21–32)
CO2 SERPL-SCNC: 25 MMOL/L (ref 18–29)
CO2 SERPL-SCNC: 25 MMOL/L (ref 21–32)
CO2 SERPL-SCNC: 25 MMOL/L (ref 21–32)
CO2 SERPL-SCNC: 26 MMOL/L (ref 21–32)
CO2 SERPL-SCNC: 28 MMOL/L (ref 21–32)
CO2 SERPL-SCNC: 29 MMOL/L (ref 21–32)
CO2 SERPL-SCNC: 29 MMOL/L (ref 21–32)
CO2 SERPL-SCNC: 30 MMOL/L (ref 21–32)
CO2 SERPL-SCNC: 30 MMOL/L (ref 21–32)
CO2 SERPL-SCNC: 31 MMOL/L (ref 21–32)
CO2 SERPL-SCNC: 32 MMOL/L (ref 21–32)
CO2 SERPL-SCNC: 35 MMOL/L (ref 21–32)
COLOR UR: ABNORMAL
CREAT BLD-MCNC: 0.4 MG/DL (ref 0.6–1.3)
CREAT BLD-MCNC: 0.4 MG/DL (ref 0.6–1.3)
CREAT BLD-MCNC: 0.5 MG/DL (ref 0.6–1.3)
CREAT BLD-MCNC: 0.6 MG/DL (ref 0.6–1.3)
CREAT SERPL-MCNC: 0.39 MG/DL (ref 0.55–1.02)
CREAT SERPL-MCNC: 0.42 MG/DL (ref 0.55–1.02)
CREAT SERPL-MCNC: 0.43 MG/DL (ref 0.55–1.02)
CREAT SERPL-MCNC: 0.48 MG/DL (ref 0.55–1.02)
CREAT SERPL-MCNC: 0.48 MG/DL (ref 0.55–1.02)
CREAT SERPL-MCNC: 0.5 MG/DL (ref 0.55–1.02)
CREAT SERPL-MCNC: 0.51 MG/DL (ref 0.55–1.02)
CREAT SERPL-MCNC: 0.55 MG/DL (ref 0.55–1.02)
CREAT SERPL-MCNC: 0.57 MG/DL (ref 0.55–1.02)
CREAT SERPL-MCNC: 0.61 MG/DL (ref 0.55–1.02)
CREAT SERPL-MCNC: 0.65 MG/DL (ref 0.55–1.02)
CREAT SERPL-MCNC: 0.68 MG/DL (ref 0.57–1)
CREAT SERPL-MCNC: 0.69 MG/DL (ref 0.55–1.02)
CREAT SERPL-MCNC: 0.75 MG/DL (ref 0.55–1.02)
DIAGNOSIS, 93000: NORMAL
DIFFERENTIAL METHOD BLD: ABNORMAL
EOSINOPHIL # BLD AUTO: 0.2 X10E3/UL (ref 0–0.4)
EOSINOPHIL # BLD: 0 K/UL
EOSINOPHIL # BLD: 0 K/UL (ref 0–0.4)
EOSINOPHIL # BLD: 0 K/UL (ref 0–0.4)
EOSINOPHIL # BLD: 0.1 K/UL (ref 0–0.4)
EOSINOPHIL # BLD: 0.1 K/UL (ref 0–0.4)
EOSINOPHIL # BLD: 0.3 K/UL (ref 0–0.4)
EOSINOPHIL NFR BLD AUTO: 3 %
EOSINOPHIL NFR BLD: 0 %
EOSINOPHIL NFR BLD: 0 % (ref 0–7)
EOSINOPHIL NFR BLD: 0 % (ref 0–7)
EOSINOPHIL NFR BLD: 1 % (ref 0–7)
EOSINOPHIL NFR BLD: 10 % (ref 0–7)
EOSINOPHIL NFR BLD: 2 % (ref 0–7)
EPAP/CPAP/PEEP, PAPEEP: 5
EPITH CASTS URNS QL MICRO: ABNORMAL /LPF
ERYTHROCYTE [DISTWIDTH] IN BLOOD BY AUTOMATED COUNT: 15.2 % (ref 11.5–14.5)
ERYTHROCYTE [DISTWIDTH] IN BLOOD BY AUTOMATED COUNT: 15.7 % (ref 12.3–15.4)
ERYTHROCYTE [DISTWIDTH] IN BLOOD BY AUTOMATED COUNT: 15.9 % (ref 11.5–14.5)
ERYTHROCYTE [DISTWIDTH] IN BLOOD BY AUTOMATED COUNT: 15.9 % (ref 11.8–15.8)
ERYTHROCYTE [DISTWIDTH] IN BLOOD BY AUTOMATED COUNT: 16 % (ref 11.5–14.5)
ERYTHROCYTE [DISTWIDTH] IN BLOOD BY AUTOMATED COUNT: 16.5 % (ref 11.8–15.8)
ERYTHROCYTE [DISTWIDTH] IN BLOOD BY AUTOMATED COUNT: 16.6 % (ref 11.5–14.5)
ERYTHROCYTE [DISTWIDTH] IN BLOOD BY AUTOMATED COUNT: 17 % (ref 11.5–14.5)
ERYTHROCYTE [DISTWIDTH] IN BLOOD BY AUTOMATED COUNT: 17.1 % (ref 11.8–15.8)
ERYTHROCYTE [DISTWIDTH] IN BLOOD BY AUTOMATED COUNT: 17.3 % (ref 11.5–14.5)
ERYTHROCYTE [DISTWIDTH] IN BLOOD BY AUTOMATED COUNT: 17.5 % (ref 11.5–14.5)
ERYTHROCYTE [DISTWIDTH] IN BLOOD BY AUTOMATED COUNT: 17.7 % (ref 11.8–15.8)
ERYTHROCYTE [DISTWIDTH] IN BLOOD BY AUTOMATED COUNT: 18 % (ref 11.5–14.5)
ERYTHROCYTE [DISTWIDTH] IN BLOOD BY AUTOMATED COUNT: 18.1 % (ref 11.5–14.5)
ERYTHROCYTE [DISTWIDTH] IN BLOOD BY AUTOMATED COUNT: 18.3 % (ref 11.5–14.5)
ERYTHROCYTE [DISTWIDTH] IN BLOOD BY AUTOMATED COUNT: 18.8 % (ref 11.8–15.8)
ERYTHROCYTE [DISTWIDTH] IN BLOOD BY AUTOMATED COUNT: 18.9 % (ref 11.8–15.8)
ERYTHROCYTE [DISTWIDTH] IN BLOOD BY AUTOMATED COUNT: 19.6 % (ref 11.8–15.8)
FIO2 ON VENT: 40 %
FIO2 ON VENT: 60 %
GAS FLOW.O2 SETTING OXYMISER: 12 L/MIN
GLOBULIN SER CALC-MCNC: 3.3 G/DL (ref 1.5–4.5)
GLOBULIN SER CALC-MCNC: 3.5 G/DL (ref 2–4)
GLOBULIN SER CALC-MCNC: 3.6 G/DL (ref 2–4)
GLOBULIN SER CALC-MCNC: 3.7 G/DL (ref 2–4)
GLOBULIN SER CALC-MCNC: 3.7 G/DL (ref 2–4)
GLOBULIN SER CALC-MCNC: 4 G/DL (ref 2–4)
GLOBULIN SER CALC-MCNC: 4.3 G/DL (ref 2–4)
GLOBULIN SER CALC-MCNC: 4.3 G/DL (ref 2–4)
GLOBULIN SER CALC-MCNC: 5 G/DL (ref 2–4)
GLUCOSE BLD STRIP.AUTO-MCNC: 133 MG/DL (ref 65–100)
GLUCOSE BLD STRIP.AUTO-MCNC: 135 MG/DL (ref 65–100)
GLUCOSE BLD STRIP.AUTO-MCNC: 147 MG/DL (ref 65–100)
GLUCOSE BLD-MCNC: 109 MG/DL (ref 65–105)
GLUCOSE BLD-MCNC: 112 MG/DL (ref 65–100)
GLUCOSE BLD-MCNC: 114 MG/DL (ref 65–105)
GLUCOSE BLD-MCNC: 115 MG/DL (ref 65–100)
GLUCOSE SERPL-MCNC: 104 MG/DL (ref 65–100)
GLUCOSE SERPL-MCNC: 106 MG/DL (ref 65–100)
GLUCOSE SERPL-MCNC: 113 MG/DL (ref 65–100)
GLUCOSE SERPL-MCNC: 118 MG/DL (ref 65–100)
GLUCOSE SERPL-MCNC: 121 MG/DL (ref 65–99)
GLUCOSE SERPL-MCNC: 124 MG/DL (ref 65–100)
GLUCOSE SERPL-MCNC: 143 MG/DL (ref 65–100)
GLUCOSE SERPL-MCNC: 149 MG/DL (ref 65–100)
GLUCOSE SERPL-MCNC: 155 MG/DL (ref 65–100)
GLUCOSE SERPL-MCNC: 160 MG/DL (ref 65–100)
GLUCOSE SERPL-MCNC: 79 MG/DL (ref 65–100)
GLUCOSE SERPL-MCNC: 80 MG/DL (ref 65–100)
GLUCOSE SERPL-MCNC: 85 MG/DL (ref 65–100)
GLUCOSE SERPL-MCNC: 90 MG/DL (ref 65–100)
GLUCOSE SERPL-MCNC: 93 MG/DL (ref 65–100)
GLUCOSE SERPL-MCNC: 98 MG/DL (ref 65–100)
GLUCOSE UR STRIP.AUTO-MCNC: NEGATIVE MG/DL
HCO3 BLDA-SCNC: 23 MMOL/L (ref 22–26)
HCO3 BLDA-SCNC: 26 MMOL/L (ref 22–26)
HCO3 BLDA-SCNC: 26 MMOL/L (ref 22–26)
HCO3 BLDA-SCNC: 27 MMOL/L (ref 22–26)
HCO3 BLDA-SCNC: 30 MMOL/L (ref 22–26)
HCT VFR BLD AUTO: 35.7 % (ref 35–47)
HCT VFR BLD AUTO: 35.8 % (ref 35–47)
HCT VFR BLD AUTO: 35.9 % (ref 35–47)
HCT VFR BLD AUTO: 36.8 % (ref 35–47)
HCT VFR BLD AUTO: 36.8 % (ref 35–47)
HCT VFR BLD AUTO: 36.9 % (ref 35–47)
HCT VFR BLD AUTO: 37 % (ref 35–47)
HCT VFR BLD AUTO: 37.7 % (ref 35–47)
HCT VFR BLD AUTO: 38 % (ref 35–47)
HCT VFR BLD AUTO: 38.4 % (ref 35–47)
HCT VFR BLD AUTO: 38.5 % (ref 35–47)
HCT VFR BLD AUTO: 38.6 % (ref 35–47)
HCT VFR BLD AUTO: 38.9 % (ref 35–47)
HCT VFR BLD AUTO: 39.3 % (ref 35–47)
HCT VFR BLD AUTO: 40.8 % (ref 35–47)
HCT VFR BLD AUTO: 41.8 % (ref 34–46.6)
HCT VFR BLD AUTO: 42.5 % (ref 35–47)
HCT VFR BLD AUTO: 43.7 % (ref 35–47)
HCT VFR BLD CALC: 38 % (ref 35–47)
HCT VFR BLD CALC: 39 % (ref 35–47)
HCT VFR BLD CALC: 41 % (ref 35–47)
HCT VFR BLD CALC: 42 % (ref 35–47)
HGB BLD-MCNC: 11.4 G/DL (ref 11.5–16)
HGB BLD-MCNC: 11.7 G/DL (ref 11.5–16)
HGB BLD-MCNC: 11.9 G/DL (ref 11.5–16)
HGB BLD-MCNC: 12.1 G/DL (ref 11.5–16)
HGB BLD-MCNC: 12.2 G/DL (ref 11.5–16)
HGB BLD-MCNC: 12.3 G/DL (ref 11.5–16)
HGB BLD-MCNC: 12.3 G/DL (ref 11.5–16)
HGB BLD-MCNC: 12.4 G/DL (ref 11.5–16)
HGB BLD-MCNC: 12.5 G/DL (ref 11.5–16)
HGB BLD-MCNC: 12.8 G/DL (ref 11.5–16)
HGB BLD-MCNC: 12.9 G/DL (ref 11.5–16)
HGB BLD-MCNC: 12.9 GM/DL (ref 11.5–16)
HGB BLD-MCNC: 13.2 G/DL (ref 11.1–15.9)
HGB BLD-MCNC: 13.3 GM/DL (ref 11.5–16)
HGB BLD-MCNC: 13.5 G/DL (ref 11.5–16)
HGB BLD-MCNC: 13.9 GM/DL (ref 11.5–16)
HGB BLD-MCNC: 14.2 G/DL (ref 11.5–16)
HGB BLD-MCNC: 14.3 GM/DL (ref 11.5–16)
HGB BLD-MCNC: 14.6 G/DL (ref 11.5–16)
HGB UR QL STRIP: NEGATIVE
HYALINE CASTS URNS QL MICRO: ABNORMAL /LPF (ref 0–5)
HYALINE CASTS URNS QL MICRO: ABNORMAL /LPF (ref 0–5)
IMM GRANULOCYTES # BLD: 0 X10E3/UL (ref 0–0.1)
IMM GRANULOCYTES NFR BLD: 0 %
INR PPP: 1.2 (ref 0.9–1.1)
KETONES UR QL STRIP.AUTO: ABNORMAL MG/DL
KETONES UR QL STRIP.AUTO: NEGATIVE MG/DL
LEUKOCYTE ESTERASE UR QL STRIP.AUTO: ABNORMAL
LEUKOCYTE ESTERASE UR QL STRIP.AUTO: NEGATIVE
LYMPHOCYTES # BLD AUTO: 1.3 X10E3/UL (ref 0.7–3.1)
LYMPHOCYTES # BLD AUTO: 13 %
LYMPHOCYTES # BLD AUTO: 15 % (ref 12–49)
LYMPHOCYTES # BLD AUTO: 17 % (ref 12–49)
LYMPHOCYTES # BLD AUTO: 2 % (ref 12–49)
LYMPHOCYTES # BLD AUTO: 25 % (ref 12–49)
LYMPHOCYTES # BLD AUTO: 25 % (ref 12–49)
LYMPHOCYTES # BLD AUTO: 31 % (ref 12–49)
LYMPHOCYTES # BLD AUTO: 32 % (ref 12–49)
LYMPHOCYTES # BLD AUTO: 33 % (ref 12–49)
LYMPHOCYTES # BLD AUTO: 33 % (ref 12–49)
LYMPHOCYTES # BLD AUTO: 42 % (ref 12–49)
LYMPHOCYTES # BLD AUTO: 53 % (ref 12–49)
LYMPHOCYTES # BLD: 0.2 K/UL (ref 0.8–3.5)
LYMPHOCYTES # BLD: 1.1 K/UL (ref 0.8–3.5)
LYMPHOCYTES # BLD: 1.2 K/UL (ref 0.8–3.5)
LYMPHOCYTES # BLD: 1.2 K/UL (ref 0.8–3.5)
LYMPHOCYTES # BLD: 1.3 K/UL (ref 0.8–3.5)
LYMPHOCYTES # BLD: 1.3 K/UL (ref 0.8–3.5)
LYMPHOCYTES # BLD: 1.5 K/UL (ref 0.8–3.5)
LYMPHOCYTES # BLD: 1.6 K/UL (ref 0.8–3.5)
LYMPHOCYTES # BLD: 1.6 K/UL (ref 0.8–3.5)
LYMPHOCYTES # BLD: 1.7 K/UL
LYMPHOCYTES # BLD: 1.8 K/UL (ref 0.8–3.5)
LYMPHOCYTES # BLD: 1.9 K/UL (ref 0.8–3.5)
LYMPHOCYTES # BLD: 3.3 K/UL (ref 0.8–3.5)
LYMPHOCYTES NFR BLD AUTO: 21 %
LYMPHOCYTES NFR BLD: 18 % (ref 12–49)
MAGNESIUM SERPL-MCNC: 1.5 MG/DL (ref 1.6–2.4)
MAGNESIUM SERPL-MCNC: 1.7 MG/DL (ref 1.6–2.4)
MAGNESIUM SERPL-MCNC: 1.9 MG/DL (ref 1.6–2.4)
MAGNESIUM SERPL-MCNC: 1.9 MG/DL (ref 1.6–2.4)
MAGNESIUM SERPL-MCNC: 2 MG/DL (ref 1.6–2.4)
MAGNESIUM SERPL-MCNC: 2 MG/DL (ref 1.6–2.4)
MAGNESIUM SERPL-MCNC: 2.1 MG/DL (ref 1.6–2.4)
MAGNESIUM SERPL-MCNC: 2.3 MG/DL (ref 1.6–2.4)
MAGNESIUM SERPL-MCNC: 2.4 MG/DL (ref 1.6–2.4)
MCH RBC QN AUTO: 28.9 PG (ref 26–34)
MCH RBC QN AUTO: 29 PG (ref 26–34)
MCH RBC QN AUTO: 29 PG (ref 26–34)
MCH RBC QN AUTO: 29.2 PG (ref 26–34)
MCH RBC QN AUTO: 29.3 PG (ref 26–34)
MCH RBC QN AUTO: 29.3 PG (ref 26–34)
MCH RBC QN AUTO: 29.4 PG (ref 26–34)
MCH RBC QN AUTO: 29.7 PG (ref 26.6–33)
MCH RBC QN AUTO: 29.7 PG (ref 26–34)
MCH RBC QN AUTO: 30 PG (ref 26–34)
MCH RBC QN AUTO: 30 PG (ref 26–34)
MCH RBC QN AUTO: 30.1 PG (ref 26–34)
MCH RBC QN AUTO: 30.2 PG (ref 26–34)
MCH RBC QN AUTO: 30.2 PG (ref 26–34)
MCH RBC QN AUTO: 30.6 PG (ref 26–34)
MCH RBC QN AUTO: 30.8 PG (ref 26–34)
MCH RBC QN AUTO: 30.9 PG (ref 26–34)
MCH RBC QN AUTO: 31.2 PG (ref 26–34)
MCHC RBC AUTO-ENTMCNC: 31.6 G/DL (ref 31.5–35.7)
MCHC RBC AUTO-ENTMCNC: 31.8 G/DL (ref 30–36.5)
MCHC RBC AUTO-ENTMCNC: 31.8 G/DL (ref 30–36.5)
MCHC RBC AUTO-ENTMCNC: 32.1 G/DL (ref 30–36.5)
MCHC RBC AUTO-ENTMCNC: 32.2 G/DL (ref 30–36.5)
MCHC RBC AUTO-ENTMCNC: 32.4 G/DL (ref 30–36.5)
MCHC RBC AUTO-ENTMCNC: 32.4 G/DL (ref 30–36.5)
MCHC RBC AUTO-ENTMCNC: 32.6 G/DL (ref 30–36.5)
MCHC RBC AUTO-ENTMCNC: 32.6 G/DL (ref 30–36.5)
MCHC RBC AUTO-ENTMCNC: 32.7 G/DL (ref 30–36.5)
MCHC RBC AUTO-ENTMCNC: 33.1 G/DL (ref 30–36.5)
MCHC RBC AUTO-ENTMCNC: 33.1 G/DL (ref 30–36.5)
MCHC RBC AUTO-ENTMCNC: 33.2 G/DL (ref 30–36.5)
MCHC RBC AUTO-ENTMCNC: 33.3 G/DL (ref 30–36.5)
MCHC RBC AUTO-ENTMCNC: 33.4 G/DL (ref 30–36.5)
MCV RBC AUTO: 89 FL (ref 80–99)
MCV RBC AUTO: 89.3 FL (ref 80–99)
MCV RBC AUTO: 89.6 FL (ref 80–99)
MCV RBC AUTO: 90.3 FL (ref 80–99)
MCV RBC AUTO: 90.5 FL (ref 80–99)
MCV RBC AUTO: 90.5 FL (ref 80–99)
MCV RBC AUTO: 91 FL (ref 80–99)
MCV RBC AUTO: 91.1 FL (ref 80–99)
MCV RBC AUTO: 91.3 FL (ref 80–99)
MCV RBC AUTO: 91.6 FL (ref 80–99)
MCV RBC AUTO: 91.8 FL (ref 80–99)
MCV RBC AUTO: 92.1 FL (ref 80–99)
MCV RBC AUTO: 92.1 FL (ref 80–99)
MCV RBC AUTO: 92.2 FL (ref 80–99)
MCV RBC AUTO: 92.2 FL (ref 80–99)
MCV RBC AUTO: 92.5 FL (ref 80–99)
MCV RBC AUTO: 93.4 FL (ref 80–99)
MCV RBC AUTO: 94 FL (ref 79–97)
MONOCYTES # BLD AUTO: 0.6 X10E3/UL (ref 0.1–0.9)
MONOCYTES # BLD: 0.1 K/UL (ref 0–1)
MONOCYTES # BLD: 0.4 K/UL
MONOCYTES # BLD: 0.5 K/UL (ref 0–1)
MONOCYTES # BLD: 0.5 K/UL (ref 0–1)
MONOCYTES # BLD: 0.8 K/UL (ref 0–1)
MONOCYTES # BLD: 1.2 K/UL (ref 0–1)
MONOCYTES NFR BLD AUTO: 12 % (ref 5–13)
MONOCYTES NFR BLD AUTO: 2 % (ref 5–13)
MONOCYTES NFR BLD AUTO: 21 % (ref 5–13)
MONOCYTES NFR BLD AUTO: 3 %
MONOCYTES NFR BLD AUTO: 7 % (ref 5–13)
MONOCYTES NFR BLD AUTO: 9 %
MONOCYTES NFR BLD: 8 % (ref 5–13)
NEUTROPHILS # BLD AUTO: 4.3 X10E3/UL (ref 1.4–7)
NEUTROPHILS NFR BLD AUTO: 67 %
NEUTS BAND NFR BLD MANUAL: 10 %
NEUTS SEG # BLD: 0.4 K/UL (ref 1.8–8)
NEUTS SEG # BLD: 1.3 K/UL (ref 1.8–8)
NEUTS SEG # BLD: 10.6 K/UL (ref 1.8–8)
NEUTS SEG # BLD: 10.8 K/UL
NEUTS SEG # BLD: 2.1 K/UL (ref 1.8–8)
NEUTS SEG # BLD: 3 K/UL (ref 1.8–8)
NEUTS SEG # BLD: 3.3 K/UL (ref 1.8–8)
NEUTS SEG # BLD: 4.1 K/UL (ref 1.8–8)
NEUTS SEG # BLD: 4.8 K/UL (ref 1.8–8)
NEUTS SEG # BLD: 5 K/UL (ref 1.8–8)
NEUTS SEG # BLD: 5.6 K/UL (ref 1.8–8)
NEUTS SEG # BLD: 6 K/UL (ref 1.8–8)
NEUTS SEG # BLD: 6.6 K/UL (ref 1.8–8)
NEUTS SEG NFR BLD AUTO: 14 % (ref 32–75)
NEUTS SEG NFR BLD AUTO: 43 % (ref 32–75)
NEUTS SEG NFR BLD AUTO: 54 % (ref 32–75)
NEUTS SEG NFR BLD AUTO: 54 % (ref 32–75)
NEUTS SEG NFR BLD AUTO: 55 % (ref 32–75)
NEUTS SEG NFR BLD AUTO: 63 % (ref 32–75)
NEUTS SEG NFR BLD AUTO: 64 % (ref 32–75)
NEUTS SEG NFR BLD AUTO: 65 % (ref 32–75)
NEUTS SEG NFR BLD AUTO: 74 %
NEUTS SEG NFR BLD AUTO: 75 % (ref 32–75)
NEUTS SEG NFR BLD AUTO: 77 % (ref 32–75)
NEUTS SEG NFR BLD AUTO: 91 % (ref 32–75)
NEUTS SEG NFR BLD: 74 % (ref 32–75)
NITRITE UR QL STRIP.AUTO: NEGATIVE
NRBC # BLD: 0 K/UL (ref 0–0.01)
NRBC BLD-RTO: 0 PER 100 WBC
P-R INTERVAL, ECG05: 126 MS
PCO2 BLDA: 32 MMHG (ref 35–45)
PCO2 BLDA: 36 MMHG (ref 35–45)
PCO2 BLDA: 36 MMHG (ref 35–45)
PCO2 BLDA: 38 MMHG (ref 35–45)
PCO2 BLDA: 39 MMHG (ref 35–45)
PH BLDA: 7.43 [PH] (ref 7.35–7.45)
PH BLDA: 7.44 [PH] (ref 7.35–7.45)
PH BLDA: 7.49 [PH] (ref 7.35–7.45)
PH BLDA: 7.52 [PH] (ref 7.35–7.45)
PH BLDA: 7.55 [PH] (ref 7.35–7.45)
PH UR STRIP: 5.5 [PH] (ref 5–8)
PH UR STRIP: 5.5 [PH] (ref 5–8)
PH UR STRIP: 6 [PH] (ref 5–8)
PH UR STRIP: 6.5 [PH] (ref 5–8)
PHOSPHATE SERPL-MCNC: 2.1 MG/DL (ref 2.6–4.7)
PHOSPHATE SERPL-MCNC: 2.2 MG/DL (ref 2.6–4.7)
PHOSPHATE SERPL-MCNC: 2.2 MG/DL (ref 2.6–4.7)
PHOSPHATE SERPL-MCNC: 2.7 MG/DL (ref 2.6–4.7)
PHOSPHATE SERPL-MCNC: 2.8 MG/DL (ref 2.6–4.7)
PLATELET # BLD AUTO: 104 K/UL (ref 150–400)
PLATELET # BLD AUTO: 106 K/UL (ref 150–400)
PLATELET # BLD AUTO: 118 K/UL (ref 150–400)
PLATELET # BLD AUTO: 135 K/UL (ref 150–400)
PLATELET # BLD AUTO: 137 K/UL (ref 150–400)
PLATELET # BLD AUTO: 140 K/UL (ref 150–400)
PLATELET # BLD AUTO: 147 K/UL (ref 150–400)
PLATELET # BLD AUTO: 168 K/UL (ref 150–400)
PLATELET # BLD AUTO: 176 K/UL (ref 150–400)
PLATELET # BLD AUTO: 192 K/UL (ref 150–400)
PLATELET # BLD AUTO: 199 K/UL (ref 150–400)
PLATELET # BLD AUTO: 199 K/UL (ref 150–400)
PLATELET # BLD AUTO: 206 K/UL (ref 150–400)
PLATELET # BLD AUTO: 230 K/UL (ref 150–400)
PLATELET # BLD AUTO: 244 K/UL (ref 150–400)
PLATELET # BLD AUTO: 273 X10E3/UL (ref 150–379)
PLATELET # BLD AUTO: 277 K/UL (ref 150–400)
PLATELET # BLD AUTO: 297 K/UL (ref 150–400)
PO2 BLDA: 142 MMHG (ref 80–100)
PO2 BLDA: 175 MMHG (ref 80–100)
PO2 BLDA: 177 MMHG (ref 80–100)
PO2 BLDA: 186 MMHG (ref 80–100)
PO2 BLDA: 206 MMHG (ref 80–100)
POTASSIUM BLD-SCNC: 2.7 MMOL/L (ref 3.5–5.1)
POTASSIUM BLD-SCNC: 3.5 MMOL/L (ref 3.5–5.1)
POTASSIUM BLD-SCNC: 3.6 MMOL/L (ref 3.5–5.1)
POTASSIUM BLD-SCNC: 3.7 MMOL/L (ref 3.5–5.1)
POTASSIUM SERPL-SCNC: 2.7 MMOL/L (ref 3.5–5.1)
POTASSIUM SERPL-SCNC: 2.8 MMOL/L (ref 3.5–5.1)
POTASSIUM SERPL-SCNC: 3 MMOL/L (ref 3.5–5.1)
POTASSIUM SERPL-SCNC: 3.1 MMOL/L (ref 3.5–5.1)
POTASSIUM SERPL-SCNC: 3.2 MMOL/L (ref 3.5–5.1)
POTASSIUM SERPL-SCNC: 3.3 MMOL/L (ref 3.5–5.1)
POTASSIUM SERPL-SCNC: 3.4 MMOL/L (ref 3.5–5.1)
POTASSIUM SERPL-SCNC: 3.4 MMOL/L (ref 3.5–5.1)
POTASSIUM SERPL-SCNC: 3.5 MMOL/L (ref 3.5–5.1)
POTASSIUM SERPL-SCNC: 3.6 MMOL/L (ref 3.5–5.1)
POTASSIUM SERPL-SCNC: 3.6 MMOL/L (ref 3.5–5.1)
POTASSIUM SERPL-SCNC: 3.7 MMOL/L (ref 3.5–5.1)
POTASSIUM SERPL-SCNC: 3.8 MMOL/L (ref 3.5–5.1)
POTASSIUM SERPL-SCNC: 4 MMOL/L (ref 3.5–5.1)
POTASSIUM SERPL-SCNC: 4.5 MMOL/L (ref 3.5–5.2)
PRESSURE SUPPORT SETTING VENT: 5 CM[H2O]
PROT SERPL-MCNC: 6.6 G/DL (ref 6.4–8.2)
PROT SERPL-MCNC: 6.7 G/DL (ref 6.4–8.2)
PROT SERPL-MCNC: 6.7 G/DL (ref 6.4–8.2)
PROT SERPL-MCNC: 6.9 G/DL (ref 6.4–8.2)
PROT SERPL-MCNC: 7.2 G/DL (ref 6.4–8.2)
PROT SERPL-MCNC: 7.4 G/DL (ref 6.4–8.2)
PROT SERPL-MCNC: 7.5 G/DL (ref 6–8.5)
PROT SERPL-MCNC: 7.6 G/DL (ref 6.4–8.2)
PROT SERPL-MCNC: 7.7 G/DL (ref 6.4–8.2)
PROT SERPL-MCNC: 7.7 G/DL (ref 6.4–8.2)
PROT SERPL-MCNC: 8.1 G/DL (ref 6.4–8.2)
PROT UR STRIP-MCNC: NEGATIVE MG/DL
PROTHROMBIN TIME: 12 SEC (ref 9–11.1)
Q-T INTERVAL, ECG07: 348 MS
QRS DURATION, ECG06: 66 MS
QTC CALCULATION (BEZET), ECG08: 444 MS
RBC # BLD AUTO: 3.9 M/UL (ref 3.8–5.2)
RBC # BLD AUTO: 3.91 M/UL (ref 3.8–5.2)
RBC # BLD AUTO: 3.94 M/UL (ref 3.8–5.2)
RBC # BLD AUTO: 3.98 M/UL (ref 3.8–5.2)
RBC # BLD AUTO: 4.01 M/UL (ref 3.8–5.2)
RBC # BLD AUTO: 4.03 M/UL (ref 3.8–5.2)
RBC # BLD AUTO: 4.05 M/UL (ref 3.8–5.2)
RBC # BLD AUTO: 4.17 M/UL (ref 3.8–5.2)
RBC # BLD AUTO: 4.2 M/UL (ref 3.8–5.2)
RBC # BLD AUTO: 4.22 M/UL (ref 3.8–5.2)
RBC # BLD AUTO: 4.22 M/UL (ref 3.8–5.2)
RBC # BLD AUTO: 4.23 M/UL (ref 3.8–5.2)
RBC # BLD AUTO: 4.31 M/UL (ref 3.8–5.2)
RBC # BLD AUTO: 4.35 M/UL (ref 3.8–5.2)
RBC # BLD AUTO: 4.45 X10E6/UL (ref 3.77–5.28)
RBC # BLD AUTO: 4.47 M/UL (ref 3.8–5.2)
RBC # BLD AUTO: 4.61 M/UL (ref 3.8–5.2)
RBC # BLD AUTO: 4.83 M/UL (ref 3.8–5.2)
RBC #/AREA URNS HPF: ABNORMAL /HPF (ref 0–5)
RBC MORPH BLD: ABNORMAL
RBC MORPH BLD: ABNORMAL
SAO2 % BLD: 99 % (ref 92–97)
SAO2% DEVICE SAO2% SENSOR NAME: ABNORMAL
SERVICE CMNT-IMP: ABNORMAL
SERVICE CMNT-IMP: NORMAL
SODIUM BLD-SCNC: 142 MMOL/L (ref 136–145)
SODIUM BLD-SCNC: 142 MMOL/L (ref 136–145)
SODIUM BLD-SCNC: 143 MMOL/L (ref 136–145)
SODIUM BLD-SCNC: 145 MMOL/L (ref 136–145)
SODIUM SERPL-SCNC: 137 MMOL/L (ref 136–145)
SODIUM SERPL-SCNC: 137 MMOL/L (ref 136–145)
SODIUM SERPL-SCNC: 139 MMOL/L (ref 136–145)
SODIUM SERPL-SCNC: 141 MMOL/L (ref 134–144)
SODIUM SERPL-SCNC: 142 MMOL/L (ref 136–145)
SODIUM SERPL-SCNC: 144 MMOL/L (ref 136–145)
SODIUM SERPL-SCNC: 145 MMOL/L (ref 136–145)
SODIUM SERPL-SCNC: 145 MMOL/L (ref 136–145)
SODIUM SERPL-SCNC: 147 MMOL/L (ref 136–145)
SP GR UR REFRACTOMETRY: 1.01 (ref 1–1.03)
SP GR UR REFRACTOMETRY: 1.03 (ref 1–1.03)
SPECIMEN EXP DATE BLD: NORMAL
SPECIMEN SITE: ABNORMAL
TROPONIN I SERPL-MCNC: 0.04 NG/ML
TROPONIN I SERPL-MCNC: <0.04 NG/ML
UA: UC IF INDICATED,UAUC: ABNORMAL
UROBILINOGEN UR QL STRIP.AUTO: 0.2 EU/DL (ref 0.2–1)
UROBILINOGEN UR QL STRIP.AUTO: 1 EU/DL (ref 0.2–1)
VENTILATION MODE VENT: ABNORMAL
VENTRICULAR RATE, ECG03: 98 BPM
VT SETTING VENT: 500 ML
VT SETTING VENT: 500 ML
VT SETTING VENT: 550 ML
WBC # BLD AUTO: 10.2 K/UL (ref 3.6–11)
WBC # BLD AUTO: 11.6 K/UL (ref 3.6–11)
WBC # BLD AUTO: 11.8 K/UL (ref 3.6–11)
WBC # BLD AUTO: 12.9 K/UL (ref 3.6–11)
WBC # BLD AUTO: 2.5 K/UL (ref 3.6–11)
WBC # BLD AUTO: 3.1 K/UL (ref 3.6–11)
WBC # BLD AUTO: 3.9 K/UL (ref 3.6–11)
WBC # BLD AUTO: 5.1 K/UL (ref 3.6–11)
WBC # BLD AUTO: 5.5 K/UL (ref 3.6–11)
WBC # BLD AUTO: 6.3 X10E3/UL (ref 3.4–10.8)
WBC # BLD AUTO: 6.4 K/UL (ref 3.6–11)
WBC # BLD AUTO: 6.5 K/UL (ref 3.6–11)
WBC # BLD AUTO: 6.8 K/UL (ref 3.6–11)
WBC # BLD AUTO: 7.4 K/UL (ref 3.6–11)
WBC # BLD AUTO: 7.7 K/UL (ref 3.6–11)
WBC # BLD AUTO: 7.7 K/UL (ref 3.6–11)
WBC # BLD AUTO: 8.6 K/UL (ref 3.6–11)
WBC # BLD AUTO: 8.9 K/UL (ref 3.6–11)
WBC MORPH BLD: ABNORMAL
WBC URNS QL MICRO: ABNORMAL /HPF (ref 0–4)

## 2017-01-01 PROCEDURE — 85025 COMPLETE CBC W/AUTO DIFF WBC: CPT | Performed by: INTERNAL MEDICINE

## 2017-01-01 PROCEDURE — 74011000250 HC RX REV CODE- 250: Performed by: HOSPITALIST

## 2017-01-01 PROCEDURE — 0651 HSPC ROUTINE HOME CARE

## 2017-01-01 PROCEDURE — G0299 HHS/HOSPICE OF RN EA 15 MIN: HCPCS

## 2017-01-01 PROCEDURE — 74177 CT ABD & PELVIS W/CONTRAST: CPT

## 2017-01-01 PROCEDURE — 36591 DRAW BLOOD OFF VENOUS DEVICE: CPT

## 2017-01-01 PROCEDURE — HOSPICE MEDICATION HC HH HOSPICE MEDICATION

## 2017-01-01 PROCEDURE — 96368 THER/DIAG CONCURRENT INF: CPT

## 2017-01-01 PROCEDURE — 80053 COMPREHEN METABOLIC PANEL: CPT | Performed by: INTERNAL MEDICINE

## 2017-01-01 PROCEDURE — 74011250636 HC RX REV CODE- 250/636: Performed by: HOSPITALIST

## 2017-01-01 PROCEDURE — G8979 MOBILITY GOAL STATUS: HCPCS

## 2017-01-01 PROCEDURE — 87086 URINE CULTURE/COLONY COUNT: CPT | Performed by: INTERNAL MEDICINE

## 2017-01-01 PROCEDURE — 83735 ASSAY OF MAGNESIUM: CPT | Performed by: INTERNAL MEDICINE

## 2017-01-01 PROCEDURE — 65610000006 HC RM INTENSIVE CARE

## 2017-01-01 PROCEDURE — 82803 BLOOD GASES ANY COMBINATION: CPT | Performed by: INTERNAL MEDICINE

## 2017-01-01 PROCEDURE — 76010000138 HC OR TIME 0.5 TO 1 HR: Performed by: OTOLARYNGOLOGY

## 2017-01-01 PROCEDURE — 74011250636 HC RX REV CODE- 250/636: Performed by: INTERNAL MEDICINE

## 2017-01-01 PROCEDURE — 80048 BASIC METABOLIC PNL TOTAL CA: CPT | Performed by: INTERNAL MEDICINE

## 2017-01-01 PROCEDURE — 80047 BASIC METABLC PNL IONIZED CA: CPT

## 2017-01-01 PROCEDURE — 74011000258 HC RX REV CODE- 258: Performed by: INTERNAL MEDICINE

## 2017-01-01 PROCEDURE — 96415 CHEMO IV INFUSION ADDL HR: CPT

## 2017-01-01 PROCEDURE — G0300 HHS/HOSPICE OF LPN EA 15 MIN: HCPCS

## 2017-01-01 PROCEDURE — 84100 ASSAY OF PHOSPHORUS: CPT | Performed by: INTERNAL MEDICINE

## 2017-01-01 PROCEDURE — 36415 COLL VENOUS BLD VENIPUNCTURE: CPT | Performed by: INTERNAL MEDICINE

## 2017-01-01 PROCEDURE — 74011250636 HC RX REV CODE- 250/636

## 2017-01-01 PROCEDURE — 96416 CHEMO PROLONG INFUSE W/PUMP: CPT

## 2017-01-01 PROCEDURE — 96374 THER/PROPH/DIAG INJ IV PUSH: CPT

## 2017-01-01 PROCEDURE — 65270000015 HC RM PRIVATE ONCOLOGY

## 2017-01-01 PROCEDURE — 74011000250 HC RX REV CODE- 250: Performed by: INTERNAL MEDICINE

## 2017-01-01 PROCEDURE — 99218 HC RM OBSERVATION: CPT

## 2017-01-01 PROCEDURE — G0155 HHCP-SVS OF CSW,EA 15 MIN: HCPCS

## 2017-01-01 PROCEDURE — 74011250637 HC RX REV CODE- 250/637

## 2017-01-01 PROCEDURE — 82550 ASSAY OF CK (CPK): CPT | Performed by: EMERGENCY MEDICINE

## 2017-01-01 PROCEDURE — 96376 TX/PRO/DX INJ SAME DRUG ADON: CPT

## 2017-01-01 PROCEDURE — 99284 EMERGENCY DEPT VISIT MOD MDM: CPT

## 2017-01-01 PROCEDURE — 96413 CHEMO IV INFUSION 1 HR: CPT

## 2017-01-01 PROCEDURE — 77030008771 HC TU NG SALEM SUMP -A: Performed by: NURSE ANESTHETIST, CERTIFIED REGISTERED

## 2017-01-01 PROCEDURE — 84484 ASSAY OF TROPONIN QUANT: CPT | Performed by: EMERGENCY MEDICINE

## 2017-01-01 PROCEDURE — 77030012965 HC NDL HUBR BBMI -A

## 2017-01-01 PROCEDURE — 97530 THERAPEUTIC ACTIVITIES: CPT

## 2017-01-01 PROCEDURE — 96523 IRRIG DRUG DELIVERY DEVICE: CPT

## 2017-01-01 PROCEDURE — 85025 COMPLETE CBC W/AUTO DIFF WBC: CPT | Performed by: EMERGENCY MEDICINE

## 2017-01-01 PROCEDURE — 36415 COLL VENOUS BLD VENIPUNCTURE: CPT | Performed by: HOSPITALIST

## 2017-01-01 PROCEDURE — 74011250637 HC RX REV CODE- 250/637: Performed by: INTERNAL MEDICINE

## 2017-01-01 PROCEDURE — 96366 THER/PROPH/DIAG IV INF ADDON: CPT

## 2017-01-01 PROCEDURE — 85027 COMPLETE CBC AUTOMATED: CPT | Performed by: INTERNAL MEDICINE

## 2017-01-01 PROCEDURE — 36600 WITHDRAWAL OF ARTERIAL BLOOD: CPT | Performed by: INTERNAL MEDICINE

## 2017-01-01 PROCEDURE — 0BH17EZ INSERTION OF ENDOTRACHEAL AIRWAY INTO TRACHEA, VIA NATURAL OR ARTIFICIAL OPENING: ICD-10-PCS | Performed by: OTOLARYNGOLOGY

## 2017-01-01 PROCEDURE — 84100 ASSAY OF PHOSPHORUS: CPT | Performed by: HOSPITALIST

## 2017-01-01 PROCEDURE — 0CJS8ZZ INSPECTION OF LARYNX, VIA NATURAL OR ARTIFICIAL OPENING ENDOSCOPIC: ICD-10-PCS | Performed by: OTOLARYNGOLOGY

## 2017-01-01 PROCEDURE — 74011000255 HC RX REV CODE- 255: Performed by: INTERNAL MEDICINE

## 2017-01-01 PROCEDURE — 83735 ASSAY OF MAGNESIUM: CPT | Performed by: HOSPITALIST

## 2017-01-01 PROCEDURE — 85027 COMPLETE CBC AUTOMATED: CPT | Performed by: HOSPITALIST

## 2017-01-01 PROCEDURE — 85610 PROTHROMBIN TIME: CPT | Performed by: EMERGENCY MEDICINE

## 2017-01-01 PROCEDURE — 86900 BLOOD TYPING SEROLOGIC ABO: CPT | Performed by: EMERGENCY MEDICINE

## 2017-01-01 PROCEDURE — 96417 CHEMO IV INFUS EACH ADDL SEQ: CPT

## 2017-01-01 PROCEDURE — 96375 TX/PRO/DX INJ NEW DRUG ADDON: CPT

## 2017-01-01 PROCEDURE — 96372 THER/PROPH/DIAG INJ SC/IM: CPT

## 2017-01-01 PROCEDURE — 94003 VENT MGMT INPAT SUBQ DAY: CPT

## 2017-01-01 PROCEDURE — 72195 MRI PELVIS W/O DYE: CPT

## 2017-01-01 PROCEDURE — 97165 OT EVAL LOW COMPLEX 30 MIN: CPT | Performed by: OCCUPATIONAL THERAPIST

## 2017-01-01 PROCEDURE — 74011000250 HC RX REV CODE- 250

## 2017-01-01 PROCEDURE — 96411 CHEMO IV PUSH ADDL DRUG: CPT

## 2017-01-01 PROCEDURE — 74181 MRI ABDOMEN W/O CONTRAST: CPT

## 2017-01-01 PROCEDURE — 76210000016 HC OR PH I REC 1 TO 1.5 HR: Performed by: OTOLARYNGOLOGY

## 2017-01-01 PROCEDURE — 94002 VENT MGMT INPAT INIT DAY: CPT

## 2017-01-01 PROCEDURE — 77030008684 HC TU ET CUF COVD -B: Performed by: NURSE ANESTHETIST, CERTIFIED REGISTERED

## 2017-01-01 PROCEDURE — A9270 NON-COVERED ITEM OR SERVICE: HCPCS

## 2017-01-01 PROCEDURE — 80048 BASIC METABOLIC PNL TOTAL CA: CPT | Performed by: HOSPITALIST

## 2017-01-01 PROCEDURE — 80076 HEPATIC FUNCTION PANEL: CPT | Performed by: INTERNAL MEDICINE

## 2017-01-01 PROCEDURE — 99285 EMERGENCY DEPT VISIT HI MDM: CPT

## 2017-01-01 PROCEDURE — 87086 URINE CULTURE/COLONY COUNT: CPT | Performed by: HOSPITALIST

## 2017-01-01 PROCEDURE — 74011000250 HC RX REV CODE- 250: Performed by: EMERGENCY MEDICINE

## 2017-01-01 PROCEDURE — 0655 HSPC INPATIENT RESPITE

## 2017-01-01 PROCEDURE — 77030011267 HC ELECTRD BLD COVD -A: Performed by: OTOLARYNGOLOGY

## 2017-01-01 PROCEDURE — 65660000000 HC RM CCU STEPDOWN

## 2017-01-01 PROCEDURE — G8978 MOBILITY CURRENT STATUS: HCPCS

## 2017-01-01 PROCEDURE — 71010 XR CHEST PORT: CPT

## 2017-01-01 PROCEDURE — 96361 HYDRATE IV INFUSION ADD-ON: CPT

## 2017-01-01 PROCEDURE — 74011636320 HC RX REV CODE- 636/320: Performed by: INTERNAL MEDICINE

## 2017-01-01 PROCEDURE — 84132 ASSAY OF SERUM POTASSIUM: CPT | Performed by: INTERNAL MEDICINE

## 2017-01-01 PROCEDURE — A4212 NON CORING NEEDLE OR STYLET: HCPCS

## 2017-01-01 PROCEDURE — 77010033678 HC OXYGEN DAILY

## 2017-01-01 PROCEDURE — 76210000019 HC OR PH I REC 4 TO 4.5 HR: Performed by: OTOLARYNGOLOGY

## 2017-01-01 PROCEDURE — 77030021678 HC GLIDESCP STAT DISP VERT -B: Performed by: ANESTHESIOLOGY

## 2017-01-01 PROCEDURE — 80053 COMPREHEN METABOLIC PANEL: CPT | Performed by: EMERGENCY MEDICINE

## 2017-01-01 PROCEDURE — 93970 EXTREMITY STUDY: CPT

## 2017-01-01 PROCEDURE — 82962 GLUCOSE BLOOD TEST: CPT

## 2017-01-01 PROCEDURE — 81001 URINALYSIS AUTO W/SCOPE: CPT | Performed by: INTERNAL MEDICINE

## 2017-01-01 PROCEDURE — 5A1945Z RESPIRATORY VENTILATION, 24-96 CONSECUTIVE HOURS: ICD-10-PCS | Performed by: OTOLARYNGOLOGY

## 2017-01-01 PROCEDURE — 99211 OFF/OP EST MAY X REQ PHY/QHP: CPT

## 2017-01-01 PROCEDURE — 76060000032 HC ANESTHESIA 0.5 TO 1 HR: Performed by: OTOLARYNGOLOGY

## 2017-01-01 PROCEDURE — 71260 CT THORAX DX C+: CPT

## 2017-01-01 PROCEDURE — A4221 SUPP NON-INSULIN INF CATH/WK: HCPCS

## 2017-01-01 PROCEDURE — 97116 GAIT TRAINING THERAPY: CPT

## 2017-01-01 PROCEDURE — 36415 COLL VENOUS BLD VENIPUNCTURE: CPT | Performed by: EMERGENCY MEDICINE

## 2017-01-01 PROCEDURE — G8987 SELF CARE CURRENT STATUS: HCPCS | Performed by: OCCUPATIONAL THERAPIST

## 2017-01-01 PROCEDURE — 74011250637 HC RX REV CODE- 250/637: Performed by: NURSE PRACTITIONER

## 2017-01-01 PROCEDURE — 3336500001 HSPC ELECTION

## 2017-01-01 PROCEDURE — G8988 SELF CARE GOAL STATUS: HCPCS | Performed by: OCCUPATIONAL THERAPIST

## 2017-01-01 PROCEDURE — 93005 ELECTROCARDIOGRAM TRACING: CPT

## 2017-01-01 PROCEDURE — 94640 AIRWAY INHALATION TREATMENT: CPT

## 2017-01-01 PROCEDURE — 97162 PT EVAL MOD COMPLEX 30 MIN: CPT

## 2017-01-01 PROCEDURE — 77030026438 HC STYL ET INTUB CARD -A: Performed by: NURSE ANESTHETIST, CERTIFIED REGISTERED

## 2017-01-01 PROCEDURE — 70551 MRI BRAIN STEM W/O DYE: CPT

## 2017-01-01 PROCEDURE — 36600 WITHDRAWAL OF ARTERIAL BLOOD: CPT | Performed by: ANESTHESIOLOGY

## 2017-01-01 PROCEDURE — 81001 URINALYSIS AUTO W/SCOPE: CPT | Performed by: HOSPITALIST

## 2017-01-01 PROCEDURE — 87040 BLOOD CULTURE FOR BACTERIA: CPT | Performed by: HOSPITALIST

## 2017-01-01 PROCEDURE — 94761 N-INVAS EAR/PLS OXIMETRY MLT: CPT

## 2017-01-01 PROCEDURE — 74011250636 HC RX REV CODE- 250/636: Performed by: EMERGENCY MEDICINE

## 2017-01-01 RX ORDER — SODIUM CHLORIDE 9 MG/ML
10 INJECTION INTRAMUSCULAR; INTRAVENOUS; SUBCUTANEOUS AS NEEDED
Status: ACTIVE | OUTPATIENT
Start: 2017-01-01 | End: 2017-01-01

## 2017-01-01 RX ORDER — SODIUM CHLORIDE 0.9 % (FLUSH) 0.9 %
10-40 SYRINGE (ML) INJECTION AS NEEDED
Status: ACTIVE | OUTPATIENT
Start: 2017-01-01 | End: 2017-01-01

## 2017-01-01 RX ORDER — DEXAMETHASONE SODIUM PHOSPHATE 4 MG/ML
12 INJECTION, SOLUTION INTRA-ARTICULAR; INTRALESIONAL; INTRAMUSCULAR; INTRAVENOUS; SOFT TISSUE ONCE
Status: ACTIVE | OUTPATIENT
Start: 2017-01-01 | End: 2017-01-01

## 2017-01-01 RX ORDER — SODIUM CITRATE 4 % (3 ML)
3 SYRINGE (ML) MISCELLANEOUS ONCE
Status: COMPLETED | OUTPATIENT
Start: 2017-01-01 | End: 2017-01-01

## 2017-01-01 RX ORDER — PALONOSETRON 0.05 MG/ML
0.25 INJECTION, SOLUTION INTRAVENOUS ONCE
Status: DISCONTINUED | OUTPATIENT
Start: 2017-01-01 | End: 2017-01-01

## 2017-01-01 RX ORDER — FLUOROURACIL 50 MG/ML
710 INJECTION, SOLUTION INTRAVENOUS ONCE
Status: DISCONTINUED | OUTPATIENT
Start: 2017-01-01 | End: 2017-01-01

## 2017-01-01 RX ORDER — ONDANSETRON 2 MG/ML
4 INJECTION INTRAMUSCULAR; INTRAVENOUS
Status: COMPLETED | OUTPATIENT
Start: 2017-01-01 | End: 2017-01-01

## 2017-01-01 RX ORDER — DEXTROSE MONOHYDRATE 50 MG/ML
25 INJECTION, SOLUTION INTRAVENOUS CONTINUOUS
Status: DISPENSED | OUTPATIENT
Start: 2017-01-01 | End: 2017-01-01

## 2017-01-01 RX ORDER — SODIUM CHLORIDE 9 MG/ML
10 INJECTION INTRAMUSCULAR; INTRAVENOUS; SUBCUTANEOUS AS NEEDED
Status: DISPENSED | OUTPATIENT
Start: 2017-01-01 | End: 2017-01-01

## 2017-01-01 RX ORDER — SODIUM CITRATE 4 % (3 ML)
3 SYRINGE (ML) MISCELLANEOUS ONCE
Status: DISCONTINUED | OUTPATIENT
Start: 2017-01-01 | End: 2017-01-01

## 2017-01-01 RX ORDER — DEXAMETHASONE SODIUM PHOSPHATE 4 MG/ML
12 INJECTION, SOLUTION INTRA-ARTICULAR; INTRALESIONAL; INTRAMUSCULAR; INTRAVENOUS; SOFT TISSUE ONCE
Status: COMPLETED | OUTPATIENT
Start: 2017-01-01 | End: 2017-01-01

## 2017-01-01 RX ORDER — MIDAZOLAM HYDROCHLORIDE 1 MG/ML
INJECTION, SOLUTION INTRAMUSCULAR; INTRAVENOUS AS NEEDED
Status: DISCONTINUED | OUTPATIENT
Start: 2017-01-01 | End: 2017-01-01 | Stop reason: HOSPADM

## 2017-01-01 RX ORDER — POTASSIUM CHLORIDE 7.45 MG/ML
10 INJECTION INTRAVENOUS ONCE
Status: COMPLETED | OUTPATIENT
Start: 2017-01-01 | End: 2017-01-01

## 2017-01-01 RX ORDER — POTASSIUM CHLORIDE 29.8 MG/ML
20 INJECTION INTRAVENOUS
Status: COMPLETED | OUTPATIENT
Start: 2017-01-01 | End: 2017-01-01

## 2017-01-01 RX ORDER — SODIUM CHLORIDE 0.9 % (FLUSH) 0.9 %
5-10 SYRINGE (ML) INJECTION AS NEEDED
Status: DISCONTINUED | OUTPATIENT
Start: 2017-01-01 | End: 2017-01-01 | Stop reason: HOSPADM

## 2017-01-01 RX ORDER — ATROPINE SULFATE 0.4 MG/ML
0.4 INJECTION, SOLUTION ENDOTRACHEAL; INTRAMEDULLARY; INTRAMUSCULAR; INTRAVENOUS; SUBCUTANEOUS
Status: ACTIVE | OUTPATIENT
Start: 2017-01-01 | End: 2017-01-01

## 2017-01-01 RX ORDER — DIPHENHYDRAMINE HYDROCHLORIDE 50 MG/ML
12.5 INJECTION, SOLUTION INTRAMUSCULAR; INTRAVENOUS
Status: DISCONTINUED | OUTPATIENT
Start: 2017-01-01 | End: 2017-01-01 | Stop reason: HOSPADM

## 2017-01-01 RX ORDER — SODIUM CHLORIDE 9 MG/ML
25 INJECTION, SOLUTION INTRAVENOUS CONTINUOUS
Status: DISPENSED | OUTPATIENT
Start: 2017-01-01 | End: 2017-01-01

## 2017-01-01 RX ORDER — DEXAMETHASONE SODIUM PHOSPHATE 4 MG/ML
12 INJECTION, SOLUTION INTRA-ARTICULAR; INTRALESIONAL; INTRAMUSCULAR; INTRAVENOUS; SOFT TISSUE ONCE
Status: DISCONTINUED | OUTPATIENT
Start: 2017-01-01 | End: 2017-01-01

## 2017-01-01 RX ORDER — FLUOROURACIL 50 MG/ML
750 INJECTION, SOLUTION INTRAVENOUS ONCE
Status: COMPLETED | OUTPATIENT
Start: 2017-01-01 | End: 2017-01-01

## 2017-01-01 RX ORDER — EPINEPHRINE 1 MG/ML
0.2 INJECTION, SOLUTION, CONCENTRATE INTRAVENOUS
Status: COMPLETED | OUTPATIENT
Start: 2017-01-01 | End: 2017-01-01

## 2017-01-01 RX ORDER — FAMOTIDINE 10 MG/ML
20 INJECTION INTRAVENOUS EVERY 12 HOURS
Status: DISCONTINUED | OUTPATIENT
Start: 2017-01-01 | End: 2017-01-01

## 2017-01-01 RX ORDER — LIDOCAINE HYDROCHLORIDE 10 MG/ML
0.1 INJECTION, SOLUTION EPIDURAL; INFILTRATION; INTRACAUDAL; PERINEURAL AS NEEDED
Status: DISCONTINUED | OUTPATIENT
Start: 2017-01-01 | End: 2017-01-01 | Stop reason: HOSPADM

## 2017-01-01 RX ORDER — HALOPERIDOL 5 MG/ML
2 INJECTION INTRAMUSCULAR
Status: DISCONTINUED | OUTPATIENT
Start: 2017-01-01 | End: 2017-10-13 | Stop reason: HOSPADM

## 2017-01-01 RX ORDER — FLUOROURACIL 50 MG/ML
750 INJECTION, SOLUTION INTRAVENOUS ONCE
Status: DISPENSED | OUTPATIENT
Start: 2017-01-01 | End: 2017-01-01

## 2017-01-01 RX ORDER — SODIUM CHLORIDE 0.9 % (FLUSH) 0.9 %
10 SYRINGE (ML) INJECTION
Status: COMPLETED | OUTPATIENT
Start: 2017-01-01 | End: 2017-01-01

## 2017-01-01 RX ORDER — DOXYCYCLINE 100 MG/1
100 TABLET ORAL 2 TIMES DAILY
Qty: 28 TAB | Refills: 0 | Status: SHIPPED | OUTPATIENT
Start: 2017-01-01 | End: 2017-01-01

## 2017-01-01 RX ORDER — AMOXICILLIN 250 MG
2 CAPSULE ORAL 2 TIMES DAILY
Status: DISCONTINUED | OUTPATIENT
Start: 2017-01-01 | End: 2017-10-13 | Stop reason: HOSPADM

## 2017-01-01 RX ORDER — PROPOFOL 10 MG/ML
INJECTION, EMULSION INTRAVENOUS AS NEEDED
Status: DISCONTINUED | OUTPATIENT
Start: 2017-01-01 | End: 2017-01-01 | Stop reason: HOSPADM

## 2017-01-01 RX ORDER — ONDANSETRON 2 MG/ML
INJECTION INTRAMUSCULAR; INTRAVENOUS AS NEEDED
Status: DISCONTINUED | OUTPATIENT
Start: 2017-01-01 | End: 2017-01-01 | Stop reason: HOSPADM

## 2017-01-01 RX ORDER — DEXTROSE MONOHYDRATE 50 MG/ML
25 INJECTION, SOLUTION INTRAVENOUS CONTINUOUS
Status: DISCONTINUED | OUTPATIENT
Start: 2017-01-01 | End: 2017-01-01

## 2017-01-01 RX ORDER — OXYCODONE HYDROCHLORIDE 5 MG/1
5-10 TABLET ORAL
Qty: 90 TAB | Refills: 0 | Status: SHIPPED | OUTPATIENT
Start: 2017-01-01

## 2017-01-01 RX ORDER — TRIFLURIDINE AND TIPIRACIL 20; 8.19 MG/1; MG/1
TABLET, FILM COATED ORAL
Refills: 11 | COMMUNITY
Start: 2017-01-01

## 2017-01-01 RX ORDER — SODIUM CITRATE 4 % (3 ML)
3 SYRINGE (ML) MISCELLANEOUS AS NEEDED
Status: DISCONTINUED | OUTPATIENT
Start: 2017-01-01 | End: 2017-01-01

## 2017-01-01 RX ORDER — LORAZEPAM 2 MG/ML
2 INJECTION INTRAMUSCULAR ONCE
Status: COMPLETED | OUTPATIENT
Start: 2017-01-01 | End: 2017-01-01

## 2017-01-01 RX ORDER — POTASSIUM CHLORIDE 7.45 MG/ML
10 INJECTION INTRAVENOUS
Status: DISCONTINUED | OUTPATIENT
Start: 2017-01-01 | End: 2017-01-01

## 2017-01-01 RX ORDER — SODIUM CHLORIDE 9 MG/ML
50 INJECTION, SOLUTION INTRAVENOUS CONTINUOUS
Status: DISCONTINUED | OUTPATIENT
Start: 2017-01-01 | End: 2017-01-01 | Stop reason: HOSPADM

## 2017-01-01 RX ORDER — GLYCOPYRROLATE 0.2 MG/ML
INJECTION INTRAMUSCULAR; INTRAVENOUS AS NEEDED
Status: DISCONTINUED | OUTPATIENT
Start: 2017-01-01 | End: 2017-01-01 | Stop reason: HOSPADM

## 2017-01-01 RX ORDER — SODIUM CHLORIDE 0.9 % (FLUSH) 0.9 %
10-40 SYRINGE (ML) INJECTION AS NEEDED
Status: DISCONTINUED | OUTPATIENT
Start: 2017-01-01 | End: 2017-01-01 | Stop reason: HOSPADM

## 2017-01-01 RX ORDER — FLUOROURACIL 50 MG/ML
720 INJECTION, SOLUTION INTRAVENOUS ONCE
Status: COMPLETED | OUTPATIENT
Start: 2017-01-01 | End: 2017-01-01

## 2017-01-01 RX ORDER — PROPOFOL 10 MG/ML
INJECTION, EMULSION INTRAVENOUS
Status: DISCONTINUED | OUTPATIENT
Start: 2017-01-01 | End: 2017-01-01 | Stop reason: HOSPADM

## 2017-01-01 RX ORDER — ATROPINE SULFATE 0.4 MG/ML
0.4 INJECTION, SOLUTION ENDOTRACHEAL; INTRAMEDULLARY; INTRAMUSCULAR; INTRAVENOUS; SUBCUTANEOUS
Status: DISCONTINUED | OUTPATIENT
Start: 2017-01-01 | End: 2017-01-01

## 2017-01-01 RX ORDER — LIDOCAINE HYDROCHLORIDE 10 MG/ML
0.1 INJECTION, SOLUTION EPIDURAL; INFILTRATION; INTRACAUDAL; PERINEURAL AS NEEDED
Status: CANCELLED | OUTPATIENT
Start: 2017-01-01

## 2017-01-01 RX ORDER — HALOPERIDOL 2 MG/ML
0.5 SOLUTION ORAL
Status: DISCONTINUED | OUTPATIENT
Start: 2017-01-01 | End: 2017-10-13 | Stop reason: HOSPADM

## 2017-01-01 RX ORDER — MORPHINE SULFATE 10 MG/ML
2 INJECTION, SOLUTION INTRAMUSCULAR; INTRAVENOUS
Status: DISCONTINUED | OUTPATIENT
Start: 2017-01-01 | End: 2017-01-01

## 2017-01-01 RX ORDER — SODIUM CHLORIDE FOR INHALATION 0.9 %
VIAL, NEBULIZER (ML) INHALATION
Status: DISPENSED
Start: 2017-01-01 | End: 2017-01-01

## 2017-01-01 RX ORDER — SODIUM CHLORIDE, SODIUM LACTATE, POTASSIUM CHLORIDE, CALCIUM CHLORIDE 600; 310; 30; 20 MG/100ML; MG/100ML; MG/100ML; MG/100ML
INJECTION, SOLUTION INTRAVENOUS
Status: DISCONTINUED | OUTPATIENT
Start: 2017-01-01 | End: 2017-01-01 | Stop reason: HOSPADM

## 2017-01-01 RX ORDER — SODIUM CHLORIDE 9 MG/ML
25 INJECTION, SOLUTION INTRAVENOUS CONTINUOUS
Status: DISCONTINUED | OUTPATIENT
Start: 2017-01-01 | End: 2017-01-01

## 2017-01-01 RX ORDER — DIPHENHYDRAMINE HYDROCHLORIDE 50 MG/ML
25 INJECTION, SOLUTION INTRAMUSCULAR; INTRAVENOUS EVERY 6 HOURS
Status: DISCONTINUED | OUTPATIENT
Start: 2017-01-01 | End: 2017-01-01 | Stop reason: HOSPADM

## 2017-01-01 RX ORDER — BARIUM SULFATE 20 MG/ML
900 SUSPENSION ORAL
Status: COMPLETED | OUTPATIENT
Start: 2017-01-01 | End: 2017-01-01

## 2017-01-01 RX ORDER — POTASSIUM CHLORIDE 750 MG/1
40 TABLET, FILM COATED, EXTENDED RELEASE ORAL
Status: COMPLETED | OUTPATIENT
Start: 2017-01-01 | End: 2017-01-01

## 2017-01-01 RX ORDER — SODIUM CHLORIDE, SODIUM LACTATE, POTASSIUM CHLORIDE, CALCIUM CHLORIDE 600; 310; 30; 20 MG/100ML; MG/100ML; MG/100ML; MG/100ML
25 INJECTION, SOLUTION INTRAVENOUS CONTINUOUS
Status: DISCONTINUED | OUTPATIENT
Start: 2017-01-01 | End: 2017-01-01 | Stop reason: HOSPADM

## 2017-01-01 RX ORDER — MORPHINE SULFATE 20 MG/ML
5 SOLUTION ORAL
Status: DISCONTINUED | OUTPATIENT
Start: 2017-01-01 | End: 2017-01-01

## 2017-01-01 RX ORDER — FACIAL-BODY WIPES
10 EACH TOPICAL DAILY PRN
Status: DISCONTINUED | OUTPATIENT
Start: 2017-01-01 | End: 2017-10-13 | Stop reason: HOSPADM

## 2017-01-01 RX ORDER — LIDOCAINE HYDROCHLORIDE 20 MG/ML
INJECTION, SOLUTION EPIDURAL; INFILTRATION; INTRACAUDAL; PERINEURAL AS NEEDED
Status: DISCONTINUED | OUTPATIENT
Start: 2017-01-01 | End: 2017-01-01 | Stop reason: HOSPADM

## 2017-01-01 RX ORDER — PALONOSETRON 0.05 MG/ML
0.25 INJECTION, SOLUTION INTRAVENOUS ONCE
Status: COMPLETED | OUTPATIENT
Start: 2017-01-01 | End: 2017-01-01

## 2017-01-01 RX ORDER — SODIUM CHLORIDE 9 MG/ML
25 INJECTION, SOLUTION INTRAVENOUS AS NEEDED
Status: DISPENSED | OUTPATIENT
Start: 2017-01-01 | End: 2017-01-01

## 2017-01-01 RX ORDER — SODIUM CHLORIDE 0.9 % (FLUSH) 0.9 %
5-10 SYRINGE (ML) INJECTION EVERY 8 HOURS
Status: DISCONTINUED | OUTPATIENT
Start: 2017-01-01 | End: 2017-01-01 | Stop reason: HOSPADM

## 2017-01-01 RX ORDER — PHENYLEPHRINE HCL IN 0.9% NACL 0.4MG/10ML
SYRINGE (ML) INTRAVENOUS AS NEEDED
Status: DISCONTINUED | OUTPATIENT
Start: 2017-01-01 | End: 2017-01-01 | Stop reason: HOSPADM

## 2017-01-01 RX ORDER — PROPOFOL 10 MG/ML
INJECTION, EMULSION INTRAVENOUS
Status: DISPENSED
Start: 2017-01-01 | End: 2017-01-01

## 2017-01-01 RX ORDER — LORAZEPAM 2 MG/ML
0.5 CONCENTRATE ORAL
Status: DISCONTINUED | OUTPATIENT
Start: 2017-01-01 | End: 2017-10-13 | Stop reason: HOSPADM

## 2017-01-01 RX ORDER — SODIUM CHLORIDE 9 MG/ML
50 INJECTION, SOLUTION INTRAVENOUS
Status: COMPLETED | OUTPATIENT
Start: 2017-01-01 | End: 2017-01-01

## 2017-01-01 RX ORDER — DIPHENHYDRAMINE HYDROCHLORIDE 50 MG/ML
INJECTION, SOLUTION INTRAMUSCULAR; INTRAVENOUS
Status: COMPLETED
Start: 2017-01-01 | End: 2017-01-01

## 2017-01-01 RX ORDER — POTASSIUM CHLORIDE 7.45 MG/ML
INJECTION INTRAVENOUS
Status: COMPLETED
Start: 2017-01-01 | End: 2017-01-01

## 2017-01-01 RX ORDER — SODIUM CITRATE 4 % (3 ML)
3 SYRINGE (ML) MISCELLANEOUS AS NEEDED
Status: DISCONTINUED | OUTPATIENT
Start: 2017-01-01 | End: 2017-01-01 | Stop reason: HOSPADM

## 2017-01-01 RX ORDER — FAMOTIDINE 10 MG/ML
20 INJECTION INTRAVENOUS
Status: DISCONTINUED | OUTPATIENT
Start: 2017-01-01 | End: 2017-01-01 | Stop reason: SDUPTHER

## 2017-01-01 RX ORDER — PALONOSETRON 0.05 MG/ML
0.25 INJECTION, SOLUTION INTRAVENOUS ONCE
Status: ACTIVE | OUTPATIENT
Start: 2017-01-01 | End: 2017-01-01

## 2017-01-01 RX ORDER — FENTANYL CITRATE 50 UG/ML
25 INJECTION, SOLUTION INTRAMUSCULAR; INTRAVENOUS
Status: DISCONTINUED | OUTPATIENT
Start: 2017-01-01 | End: 2017-01-01

## 2017-01-01 RX ORDER — ATROPINE SULFATE 0.4 MG/ML
0.4 INJECTION, SOLUTION ENDOTRACHEAL; INTRAMEDULLARY; INTRAMUSCULAR; INTRAVENOUS; SUBCUTANEOUS
Status: DISPENSED | OUTPATIENT
Start: 2017-01-01 | End: 2017-01-01

## 2017-01-01 RX ORDER — FLUOROURACIL 50 MG/ML
720 INJECTION, SOLUTION INTRAVENOUS ONCE
Status: DISCONTINUED | OUTPATIENT
Start: 2017-01-01 | End: 2017-01-01

## 2017-01-01 RX ORDER — FLUOROURACIL 50 MG/ML
750 INJECTION, SOLUTION INTRAVENOUS ONCE
Status: DISCONTINUED | OUTPATIENT
Start: 2017-01-01 | End: 2017-01-01

## 2017-01-01 RX ORDER — FEXOFENADINE HCL AND PSEUDOEPHEDRINE HCI 60; 120 MG/1; MG/1
1 TABLET, EXTENDED RELEASE ORAL
COMMUNITY
Start: 2017-01-01

## 2017-01-01 RX ORDER — SODIUM CHLORIDE 9 MG/ML
INJECTION INTRAMUSCULAR; INTRAVENOUS; SUBCUTANEOUS
Status: COMPLETED
Start: 2017-01-01 | End: 2017-01-01

## 2017-01-01 RX ORDER — ACETAMINOPHEN 325 MG/1
650 TABLET ORAL
Status: DISCONTINUED | OUTPATIENT
Start: 2017-01-01 | End: 2017-01-01 | Stop reason: HOSPADM

## 2017-01-01 RX ORDER — POTASSIUM CHLORIDE 20 MEQ/1
20 TABLET, EXTENDED RELEASE ORAL 2 TIMES DAILY
Qty: 60 TAB | Refills: 3 | Status: SHIPPED | OUTPATIENT
Start: 2017-01-01

## 2017-01-01 RX ORDER — SODIUM CHLORIDE 9 MG/ML
10 INJECTION INTRAMUSCULAR; INTRAVENOUS; SUBCUTANEOUS AS NEEDED
Status: CANCELLED | OUTPATIENT
Start: 2017-01-01 | End: 2017-01-01

## 2017-01-01 RX ORDER — HYDROMORPHONE HYDROCHLORIDE 1 MG/ML
.2-.5 INJECTION, SOLUTION INTRAMUSCULAR; INTRAVENOUS; SUBCUTANEOUS
Status: DISCONTINUED | OUTPATIENT
Start: 2017-01-01 | End: 2017-01-01 | Stop reason: HOSPADM

## 2017-01-01 RX ORDER — HYDROCORTISONE SODIUM SUCCINATE 100 MG/2ML
INJECTION, POWDER, FOR SOLUTION INTRAMUSCULAR; INTRAVENOUS AS NEEDED
Status: DISCONTINUED | OUTPATIENT
Start: 2017-01-01 | End: 2017-01-01 | Stop reason: HOSPADM

## 2017-01-01 RX ORDER — SODIUM CHLORIDE 0.9 % (FLUSH) 0.9 %
5-10 SYRINGE (ML) INJECTION AS NEEDED
Status: DISCONTINUED | OUTPATIENT
Start: 2017-01-01 | End: 2017-01-01

## 2017-01-01 RX ORDER — DIPHENHYDRAMINE HYDROCHLORIDE 50 MG/ML
25 INJECTION, SOLUTION INTRAMUSCULAR; INTRAVENOUS
Status: COMPLETED | OUTPATIENT
Start: 2017-01-01 | End: 2017-01-01

## 2017-01-01 RX ORDER — PREDNISONE 10 MG/1
50 TABLET ORAL SEE ADMIN INSTRUCTIONS
Qty: 15 TAB | Refills: 0 | Status: SHIPPED | OUTPATIENT
Start: 2017-01-01 | End: 2017-01-01

## 2017-01-01 RX ORDER — MORPHINE SULFATE 10 MG/ML
2 INJECTION, SOLUTION INTRAMUSCULAR; INTRAVENOUS
Status: DISCONTINUED | OUTPATIENT
Start: 2017-01-01 | End: 2017-01-01 | Stop reason: HOSPADM

## 2017-01-01 RX ORDER — ATROPINE SULFATE 0.4 MG/ML
0.4 INJECTION, SOLUTION ENDOTRACHEAL; INTRAMEDULLARY; INTRAMUSCULAR; INTRAVENOUS; SUBCUTANEOUS
Status: COMPLETED | OUTPATIENT
Start: 2017-01-01 | End: 2017-01-01

## 2017-01-01 RX ORDER — FENTANYL CITRATE 50 UG/ML
25 INJECTION, SOLUTION INTRAMUSCULAR; INTRAVENOUS
Status: DISCONTINUED | OUTPATIENT
Start: 2017-01-01 | End: 2017-01-01 | Stop reason: HOSPADM

## 2017-01-01 RX ORDER — FENTANYL CITRATE 50 UG/ML
INJECTION, SOLUTION INTRAMUSCULAR; INTRAVENOUS AS NEEDED
Status: DISCONTINUED | OUTPATIENT
Start: 2017-01-01 | End: 2017-01-01 | Stop reason: HOSPADM

## 2017-01-01 RX ORDER — METHYLPREDNISOLONE 4 MG/1
TABLET ORAL
Qty: 1 DOSE PACK | Refills: 0 | Status: SHIPPED | OUTPATIENT
Start: 2017-01-01

## 2017-01-01 RX ORDER — OXYMETAZOLINE HCL 0.05 %
SPRAY, NON-AEROSOL (ML) NASAL AS NEEDED
Status: DISCONTINUED | OUTPATIENT
Start: 2017-01-01 | End: 2017-01-01 | Stop reason: HOSPADM

## 2017-01-01 RX ORDER — SODIUM CHLORIDE 0.9 % (FLUSH) 0.9 %
10-40 SYRINGE (ML) INJECTION AS NEEDED
Status: CANCELLED | OUTPATIENT
Start: 2017-01-01 | End: 2017-01-01

## 2017-01-01 RX ORDER — MAGNESIUM SULFATE HEPTAHYDRATE 40 MG/ML
2 INJECTION, SOLUTION INTRAVENOUS ONCE
Status: DISCONTINUED | OUTPATIENT
Start: 2017-01-01 | End: 2017-01-01

## 2017-01-01 RX ORDER — MORPHINE SULFATE 20 MG/ML
10 SOLUTION ORAL
Status: DISCONTINUED | OUTPATIENT
Start: 2017-01-01 | End: 2017-10-13 | Stop reason: HOSPADM

## 2017-01-01 RX ORDER — OXYCODONE HYDROCHLORIDE 5 MG/1
10 TABLET ORAL
Status: DISCONTINUED | OUTPATIENT
Start: 2017-01-01 | End: 2017-01-01

## 2017-01-01 RX ORDER — ROCURONIUM BROMIDE 10 MG/ML
INJECTION, SOLUTION INTRAVENOUS AS NEEDED
Status: DISCONTINUED | OUTPATIENT
Start: 2017-01-01 | End: 2017-01-01 | Stop reason: HOSPADM

## 2017-01-01 RX ORDER — SODIUM CHLORIDE 9 MG/ML
75 INJECTION, SOLUTION INTRAVENOUS CONTINUOUS
Status: DISCONTINUED | OUTPATIENT
Start: 2017-01-01 | End: 2017-01-01 | Stop reason: HOSPADM

## 2017-01-01 RX ORDER — SODIUM CHLORIDE 0.9 % (FLUSH) 0.9 %
SYRINGE (ML) INJECTION
Status: DISPENSED
Start: 2017-01-01 | End: 2017-01-01

## 2017-01-01 RX ORDER — ONDANSETRON 2 MG/ML
INJECTION INTRAMUSCULAR; INTRAVENOUS
Status: DISPENSED
Start: 2017-01-01 | End: 2017-01-01

## 2017-01-01 RX ORDER — ONDANSETRON 4 MG/1
4 TABLET, ORALLY DISINTEGRATING ORAL
Status: DISCONTINUED | OUTPATIENT
Start: 2017-01-01 | End: 2017-10-13 | Stop reason: HOSPADM

## 2017-01-01 RX ORDER — POTASSIUM CHLORIDE 7.45 MG/ML
INJECTION INTRAVENOUS
Status: DISPENSED
Start: 2017-01-01 | End: 2017-01-01

## 2017-01-01 RX ORDER — HYDROMORPHONE HYDROCHLORIDE 1 MG/ML
.2-.5 INJECTION, SOLUTION INTRAMUSCULAR; INTRAVENOUS; SUBCUTANEOUS
Status: DISCONTINUED | OUTPATIENT
Start: 2017-01-01 | End: 2017-01-01

## 2017-01-01 RX ORDER — HYDROMORPHONE HYDROCHLORIDE 2 MG/ML
INJECTION, SOLUTION INTRAMUSCULAR; INTRAVENOUS; SUBCUTANEOUS AS NEEDED
Status: DISCONTINUED | OUTPATIENT
Start: 2017-01-01 | End: 2017-01-01 | Stop reason: HOSPADM

## 2017-01-01 RX ORDER — ACETAMINOPHEN 650 MG/1
650 SUPPOSITORY RECTAL
Status: DISCONTINUED | OUTPATIENT
Start: 2017-01-01 | End: 2017-10-13 | Stop reason: HOSPADM

## 2017-01-01 RX ORDER — FLUTICASONE PROPIONATE 50 MCG
2 SPRAY, SUSPENSION (ML) NASAL DAILY
Qty: 1 BOTTLE | Refills: 3 | Status: SHIPPED | OUTPATIENT
Start: 2017-01-01

## 2017-01-01 RX ORDER — ACETAMINOPHEN 500 MG
500 TABLET ORAL
Status: DISCONTINUED | OUTPATIENT
Start: 2017-01-01 | End: 2017-10-13 | Stop reason: HOSPADM

## 2017-01-01 RX ORDER — CHLORHEXIDINE GLUCONATE 1.2 MG/ML
15 RINSE ORAL EVERY 12 HOURS
Status: DISCONTINUED | OUTPATIENT
Start: 2017-01-01 | End: 2017-01-01 | Stop reason: HOSPADM

## 2017-01-01 RX ORDER — PROPOFOL 10 MG/ML
5-50 VIAL (ML) INTRAVENOUS
Status: DISCONTINUED | OUTPATIENT
Start: 2017-01-01 | End: 2017-01-01

## 2017-01-01 RX ORDER — FLUOROURACIL 50 MG/ML
720 INJECTION, SOLUTION INTRAVENOUS ONCE
Status: DISPENSED | OUTPATIENT
Start: 2017-01-01 | End: 2017-01-01

## 2017-01-01 RX ORDER — ONDANSETRON 2 MG/ML
4 INJECTION INTRAMUSCULAR; INTRAVENOUS
Status: DISCONTINUED | OUTPATIENT
Start: 2017-01-01 | End: 2017-01-01 | Stop reason: HOSPADM

## 2017-01-01 RX ORDER — SODIUM CHLORIDE, SODIUM LACTATE, POTASSIUM CHLORIDE, CALCIUM CHLORIDE 600; 310; 30; 20 MG/100ML; MG/100ML; MG/100ML; MG/100ML
25 INJECTION, SOLUTION INTRAVENOUS CONTINUOUS
Status: CANCELLED | OUTPATIENT
Start: 2017-01-01 | End: 2017-01-01

## 2017-01-01 RX ORDER — SCOLOPAMINE TRANSDERMAL SYSTEM 1 MG/1
1.5 PATCH, EXTENDED RELEASE TRANSDERMAL
Status: DISCONTINUED | OUTPATIENT
Start: 2017-01-01 | End: 2017-10-13 | Stop reason: HOSPADM

## 2017-01-01 RX ORDER — FENTANYL CITRATE 50 UG/ML
50 INJECTION, SOLUTION INTRAMUSCULAR; INTRAVENOUS
Status: DISCONTINUED | OUTPATIENT
Start: 2017-01-01 | End: 2017-01-01 | Stop reason: HOSPADM

## 2017-01-01 RX ORDER — EPINEPHRINE 1 MG/ML
INJECTION, SOLUTION, CONCENTRATE INTRAVENOUS
Status: COMPLETED
Start: 2017-01-01 | End: 2017-01-01

## 2017-01-01 RX ORDER — ALPRAZOLAM 0.25 MG/1
0.25 TABLET ORAL SEE ADMIN INSTRUCTIONS
Qty: 10 TAB | Refills: 0 | Status: SHIPPED | OUTPATIENT
Start: 2017-01-01

## 2017-01-01 RX ORDER — POLYETHYLENE GLYCOL 3350 17 G/17G
17 POWDER, FOR SOLUTION ORAL DAILY
Status: DISCONTINUED | OUTPATIENT
Start: 2017-01-01 | End: 2017-10-13 | Stop reason: HOSPADM

## 2017-01-01 RX ORDER — MIDAZOLAM HYDROCHLORIDE 1 MG/ML
0.5 INJECTION, SOLUTION INTRAMUSCULAR; INTRAVENOUS
Status: DISCONTINUED | OUTPATIENT
Start: 2017-01-01 | End: 2017-01-01

## 2017-01-01 RX ORDER — SODIUM CITRATE 4 % (3 ML)
3 SYRINGE (ML) MISCELLANEOUS AS NEEDED
Status: DISPENSED | OUTPATIENT
Start: 2017-01-01 | End: 2017-01-01

## 2017-01-01 RX ORDER — HALOPERIDOL 2 MG/ML
2 SOLUTION ORAL
Status: DISCONTINUED | OUTPATIENT
Start: 2017-01-01 | End: 2017-10-13 | Stop reason: HOSPADM

## 2017-01-01 RX ORDER — POTASSIUM CHLORIDE 1.5 G/1.77G
40 POWDER, FOR SOLUTION ORAL
Status: COMPLETED | OUTPATIENT
Start: 2017-01-01 | End: 2017-01-01

## 2017-01-01 RX ORDER — MAGNESIUM SULFATE HEPTAHYDRATE 40 MG/ML
2 INJECTION, SOLUTION INTRAVENOUS ONCE
Status: COMPLETED | OUTPATIENT
Start: 2017-01-01 | End: 2017-01-01

## 2017-01-01 RX ORDER — ETOMIDATE 2 MG/ML
INJECTION INTRAVENOUS AS NEEDED
Status: DISCONTINUED | OUTPATIENT
Start: 2017-01-01 | End: 2017-01-01 | Stop reason: HOSPADM

## 2017-01-01 RX ORDER — DIPHENHYDRAMINE HYDROCHLORIDE 50 MG/ML
12.5 INJECTION, SOLUTION INTRAMUSCULAR; INTRAVENOUS AS NEEDED
Status: DISCONTINUED | OUTPATIENT
Start: 2017-01-01 | End: 2017-01-01

## 2017-01-01 RX ORDER — ONDANSETRON 2 MG/ML
4 INJECTION INTRAMUSCULAR; INTRAVENOUS AS NEEDED
Status: DISCONTINUED | OUTPATIENT
Start: 2017-01-01 | End: 2017-01-01

## 2017-01-01 RX ORDER — FENTANYL CITRATE 50 UG/ML
50 INJECTION, SOLUTION INTRAMUSCULAR; INTRAVENOUS AS NEEDED
Status: CANCELLED | OUTPATIENT
Start: 2017-01-01

## 2017-01-01 RX ADMIN — PROPOFOL 30 MCG/KG/MIN: 10 INJECTION, EMULSION INTRAVENOUS at 20:28

## 2017-01-01 RX ADMIN — DEXTROSE MONOHYDRATE 720 MG: 5 INJECTION, SOLUTION INTRAVENOUS at 09:50

## 2017-01-01 RX ADMIN — PROPOFOL 50 MCG/KG/MIN: 10 INJECTION, EMULSION INTRAVENOUS at 22:53

## 2017-01-01 RX ADMIN — PROPOFOL 150 MG: 10 INJECTION, EMULSION INTRAVENOUS at 15:10

## 2017-01-01 RX ADMIN — Medication 10 ML: at 14:15

## 2017-01-01 RX ADMIN — PEGFILGRASTIM 6 MG: 6 INJECTION SUBCUTANEOUS at 15:44

## 2017-01-01 RX ADMIN — Medication 10 ML: at 09:57

## 2017-01-01 RX ADMIN — METHYLPREDNISOLONE SODIUM SUCCINATE 40 MG: 125 INJECTION, POWDER, FOR SOLUTION INTRAMUSCULAR; INTRAVENOUS at 00:54

## 2017-01-01 RX ADMIN — DIPHENHYDRAMINE HYDROCHLORIDE 25 MG: 50 INJECTION, SOLUTION INTRAMUSCULAR; INTRAVENOUS at 14:11

## 2017-01-01 RX ADMIN — DEXAMETHASONE SODIUM PHOSPHATE 12 MG: 4 INJECTION, SOLUTION INTRA-ARTICULAR; INTRALESIONAL; INTRAMUSCULAR; INTRAVENOUS; SOFT TISSUE at 10:33

## 2017-01-01 RX ADMIN — Medication 3 ML: at 13:12

## 2017-01-01 RX ADMIN — CHLORHEXIDINE GLUCONATE 15 ML: 1.2 RINSE ORAL at 08:53

## 2017-01-01 RX ADMIN — PALONOSETRON HYDROCHLORIDE 0.25 MG: 0.25 INJECTION INTRAVENOUS at 10:29

## 2017-01-01 RX ADMIN — SODIUM CHLORIDE 75 ML/HR: 900 INJECTION, SOLUTION INTRAVENOUS at 00:30

## 2017-01-01 RX ADMIN — Medication 3 ML: at 13:29

## 2017-01-01 RX ADMIN — LORAZEPAM 0.5 MG: 2 SOLUTION, CONCENTRATE ORAL at 16:17

## 2017-01-01 RX ADMIN — Medication 5 ML: at 10:15

## 2017-01-01 RX ADMIN — POTASSIUM CHLORIDE 40 MEQ: 1.5 POWDER, FOR SOLUTION ORAL at 08:48

## 2017-01-01 RX ADMIN — Medication 10 ML: at 21:31

## 2017-01-01 RX ADMIN — FAMOTIDINE 20 MG: 10 INJECTION, SOLUTION INTRAVENOUS at 08:41

## 2017-01-01 RX ADMIN — MORPHINE SULFATE 10 MG: 20 SOLUTION ORAL at 17:53

## 2017-01-01 RX ADMIN — Medication 10 ML: at 09:15

## 2017-01-01 RX ADMIN — Medication 10 ML: at 13:20

## 2017-01-01 RX ADMIN — Medication 10 ML: at 09:25

## 2017-01-01 RX ADMIN — DEXAMETHASONE SODIUM PHOSPHATE 12 MG: 4 INJECTION, SOLUTION INTRA-ARTICULAR; INTRALESIONAL; INTRAMUSCULAR; INTRAVENOUS; SOFT TISSUE at 10:46

## 2017-01-01 RX ADMIN — Medication 10 ML: at 13:31

## 2017-01-01 RX ADMIN — LEUCOVORIN CALCIUM 750 MG: 350 INJECTION, POWDER, LYOPHILIZED, FOR SOLUTION INTRAMUSCULAR; INTRAVENOUS at 10:20

## 2017-01-01 RX ADMIN — DEXTROSE MONOHYDRATE 25 ML/HR: 5 INJECTION, SOLUTION INTRAVENOUS at 10:29

## 2017-01-01 RX ADMIN — Medication 10 ML: at 05:50

## 2017-01-01 RX ADMIN — POTASSIUM CHLORIDE 10 MEQ: 7.45 INJECTION INTRAVENOUS at 16:11

## 2017-01-01 RX ADMIN — MORPHINE SULFATE 5 MG: 20 SOLUTION ORAL at 19:18

## 2017-01-01 RX ADMIN — Medication 10 ML: at 05:36

## 2017-01-01 RX ADMIN — POTASSIUM CHLORIDE 10 MEQ: 7.45 INJECTION INTRAVENOUS at 19:15

## 2017-01-01 RX ADMIN — Medication 3 ML: at 15:44

## 2017-01-01 RX ADMIN — MORPHINE SULFATE 5 MG: 20 SOLUTION ORAL at 23:21

## 2017-01-01 RX ADMIN — ACETAMINOPHEN 500 MG: 500 TABLET ORAL at 19:59

## 2017-01-01 RX ADMIN — PANITUMUMAB 425 MG: 400 SOLUTION INTRAVENOUS at 11:45

## 2017-01-01 RX ADMIN — PALONOSETRON HYDROCHLORIDE 0.25 MG: 0.25 INJECTION INTRAVENOUS at 09:33

## 2017-01-01 RX ADMIN — Medication 3 ML: at 09:29

## 2017-01-01 RX ADMIN — Medication 10 ML: at 13:12

## 2017-01-01 RX ADMIN — FAMOTIDINE 20 MG: 10 INJECTION, SOLUTION INTRAVENOUS at 08:46

## 2017-01-01 RX ADMIN — METHYLPREDNISOLONE SODIUM SUCCINATE 40 MG: 40 INJECTION, POWDER, FOR SOLUTION INTRAMUSCULAR; INTRAVENOUS at 14:12

## 2017-01-01 RX ADMIN — PANITUMUMAB 460 MG: 400 SOLUTION INTRAVENOUS at 13:03

## 2017-01-01 RX ADMIN — OXYCODONE HYDROCHLORIDE 10 MG: 5 TABLET ORAL at 10:38

## 2017-01-01 RX ADMIN — PROPOFOL 50 MCG/KG/MIN: 10 INJECTION, EMULSION INTRAVENOUS at 16:00

## 2017-01-01 RX ADMIN — CHLORHEXIDINE GLUCONATE 15 ML: 1.2 RINSE ORAL at 21:15

## 2017-01-01 RX ADMIN — FLUOROURACIL 4340 MG: 2.5 INJECTION, SOLUTION INTRAVENOUS at 13:48

## 2017-01-01 RX ADMIN — OXALIPLATIN 160 MG: 5 INJECTION, SOLUTION, CONCENTRATE INTRAVENOUS at 10:48

## 2017-01-01 RX ADMIN — SODIUM CHLORIDE 50 ML/HR: 900 INJECTION, SOLUTION INTRAVENOUS at 08:00

## 2017-01-01 RX ADMIN — PROPOFOL 30 MCG/KG/MIN: 10 INJECTION, EMULSION INTRAVENOUS at 22:02

## 2017-01-01 RX ADMIN — Medication 3 ML: at 13:41

## 2017-01-01 RX ADMIN — LORAZEPAM 0.5 MG: 2 SOLUTION, CONCENTRATE ORAL at 13:10

## 2017-01-01 RX ADMIN — FLUOROURACIL 4340 MG: 50 INJECTION, SOLUTION INTRAVENOUS at 13:00

## 2017-01-01 RX ADMIN — MIDAZOLAM HYDROCHLORIDE 2 MG: 1 INJECTION, SOLUTION INTRAMUSCULAR; INTRAVENOUS at 15:15

## 2017-01-01 RX ADMIN — MAGNESIUM SULFATE HEPTAHYDRATE 2 G: 40 INJECTION, SOLUTION INTRAVENOUS at 11:07

## 2017-01-01 RX ADMIN — EPINEPHRINE: 1 INJECTION, SOLUTION INTRAMUSCULAR; SUBCUTANEOUS at 14:18

## 2017-01-01 RX ADMIN — DEXTROSE MONOHYDRATE 25 ML/HR: 5 INJECTION, SOLUTION INTRAVENOUS at 10:41

## 2017-01-01 RX ADMIN — DIPHENHYDRAMINE HYDROCHLORIDE 50 MG: 50 INJECTION INTRAMUSCULAR; INTRAVENOUS at 14:16

## 2017-01-01 RX ADMIN — FAMOTIDINE 20 MG: 10 INJECTION, SOLUTION INTRAVENOUS at 21:32

## 2017-01-01 RX ADMIN — LIDOCAINE HYDROCHLORIDE 100 MG: 20 INJECTION, SOLUTION EPIDURAL; INFILTRATION; INTRACAUDAL; PERINEURAL at 10:45

## 2017-01-01 RX ADMIN — Medication 10 ML: at 14:03

## 2017-01-01 RX ADMIN — LORAZEPAM 2 MG: 2 INJECTION INTRAMUSCULAR; INTRAVENOUS at 08:49

## 2017-01-01 RX ADMIN — Medication 3 ML: at 14:13

## 2017-01-01 RX ADMIN — Medication 10 ML: at 23:20

## 2017-01-01 RX ADMIN — Medication 10 ML: at 13:50

## 2017-01-01 RX ADMIN — EPINEPHRINE 0.2 MG: 1 INJECTION, SOLUTION INTRAMUSCULAR; SUBCUTANEOUS at 15:53

## 2017-01-01 RX ADMIN — POTASSIUM CHLORIDE 10 MEQ: 10 INJECTION, SOLUTION INTRAVENOUS at 16:11

## 2017-01-01 RX ADMIN — PROPOFOL 50 MCG/KG/MIN: 10 INJECTION, EMULSION INTRAVENOUS at 15:20

## 2017-01-01 RX ADMIN — ATROPINE SULFATE 0.4 MG: 0.4 INJECTION, SOLUTION INTRAMUSCULAR; INTRAVENOUS; SUBCUTANEOUS at 09:43

## 2017-01-01 RX ADMIN — Medication 10 ML: at 14:12

## 2017-01-01 RX ADMIN — POTASSIUM CHLORIDE 10 MEQ: 10 INJECTION, SOLUTION INTRAVENOUS at 19:15

## 2017-01-01 RX ADMIN — RACEPINEPHRINE HYDROCHLORIDE 0.25 ML: 11.25 SOLUTION RESPIRATORY (INHALATION) at 14:47

## 2017-01-01 RX ADMIN — PANITUMUMAB 460 MG: 100 SOLUTION INTRAVENOUS at 13:10

## 2017-01-01 RX ADMIN — Medication 20 ML: at 14:13

## 2017-01-01 RX ADMIN — DEXTROSE MONOHYDRATE 25 ML/HR: 5 INJECTION, SOLUTION INTRAVENOUS at 09:36

## 2017-01-01 RX ADMIN — Medication 20 ML: at 13:29

## 2017-01-01 RX ADMIN — SODIUM CHLORIDE 10 ML: 9 INJECTION, SOLUTION INTRAMUSCULAR; INTRAVENOUS; SUBCUTANEOUS at 08:36

## 2017-01-01 RX ADMIN — POTASSIUM CHLORIDE 40 MEQ: 750 TABLET, FILM COATED, EXTENDED RELEASE ORAL at 10:52

## 2017-01-01 RX ADMIN — Medication 3 ML: at 15:18

## 2017-01-01 RX ADMIN — HALOPERIDOL 2 MG: 2 SOLUTION ORAL at 12:08

## 2017-01-01 RX ADMIN — Medication 10 ML: at 05:43

## 2017-01-01 RX ADMIN — LEUCOVORIN CALCIUM 750 MG: 350 INJECTION, POWDER, LYOPHILIZED, FOR SOLUTION INTRAMUSCULAR; INTRAVENOUS at 11:50

## 2017-01-01 RX ADMIN — ONDANSETRON 4 MG: 2 INJECTION INTRAMUSCULAR; INTRAVENOUS at 15:20

## 2017-01-01 RX ADMIN — METHYLPREDNISOLONE SODIUM SUCCINATE 40 MG: 40 INJECTION, POWDER, FOR SOLUTION INTRAMUSCULAR; INTRAVENOUS at 06:56

## 2017-01-01 RX ADMIN — SODIUM CHLORIDE 25 ML/HR: 900 INJECTION, SOLUTION INTRAVENOUS at 12:05

## 2017-01-01 RX ADMIN — SODIUM CHLORIDE 25 ML/HR: 900 INJECTION, SOLUTION INTRAVENOUS at 12:50

## 2017-01-01 RX ADMIN — IRINOTECAN HYDROCHLORIDE 170 MG: 20 INJECTION, SOLUTION INTRAVENOUS at 10:16

## 2017-01-01 RX ADMIN — FAMOTIDINE 20 MG: 10 INJECTION, SOLUTION INTRAVENOUS at 21:23

## 2017-01-01 RX ADMIN — METHYLPREDNISOLONE SODIUM SUCCINATE 125 MG: 125 INJECTION, POWDER, FOR SOLUTION INTRAMUSCULAR; INTRAVENOUS at 14:14

## 2017-01-01 RX ADMIN — METHYLPREDNISOLONE SODIUM SUCCINATE 40 MG: 40 INJECTION, POWDER, FOR SOLUTION INTRAMUSCULAR; INTRAVENOUS at 05:36

## 2017-01-01 RX ADMIN — POTASSIUM CHLORIDE 20 MEQ: 400 INJECTION, SOLUTION INTRAVENOUS at 07:05

## 2017-01-01 RX ADMIN — FLUOROURACIL 4485 MG: 50 INJECTION, SOLUTION INTRAVENOUS at 12:50

## 2017-01-01 RX ADMIN — SODIUM CHLORIDE 10 ML: 9 INJECTION, SOLUTION INTRAMUSCULAR; INTRAVENOUS; SUBCUTANEOUS at 09:15

## 2017-01-01 RX ADMIN — SODIUM CHLORIDE 10 ML: 9 INJECTION INTRAMUSCULAR; INTRAVENOUS; SUBCUTANEOUS at 13:20

## 2017-01-01 RX ADMIN — POTASSIUM CHLORIDE 40 MEQ: 750 TABLET, FILM COATED, EXTENDED RELEASE ORAL at 13:26

## 2017-01-01 RX ADMIN — Medication 10 ML: at 06:56

## 2017-01-01 RX ADMIN — IOPAMIDOL 100 ML: 755 INJECTION, SOLUTION INTRAVENOUS at 08:00

## 2017-01-01 RX ADMIN — DEXTROSE MONOHYDRATE 25 ML/HR: 5 INJECTION, SOLUTION INTRAVENOUS at 09:39

## 2017-01-01 RX ADMIN — PALONOSETRON HYDROCHLORIDE 0.25 MG: 0.25 INJECTION INTRAVENOUS at 10:41

## 2017-01-01 RX ADMIN — Medication 10 ML: at 22:01

## 2017-01-01 RX ADMIN — FENTANYL CITRATE 50 MCG: 50 INJECTION, SOLUTION INTRAMUSCULAR; INTRAVENOUS at 09:48

## 2017-01-01 RX ADMIN — SODIUM CHLORIDE 10 ML: 9 INJECTION, SOLUTION INTRAMUSCULAR; INTRAVENOUS; SUBCUTANEOUS at 09:20

## 2017-01-01 RX ADMIN — SODIUM CHLORIDE 50 ML/HR: 900 INJECTION, SOLUTION INTRAVENOUS at 18:06

## 2017-01-01 RX ADMIN — POTASSIUM CHLORIDE 20 MEQ: 400 INJECTION, SOLUTION INTRAVENOUS at 08:46

## 2017-01-01 RX ADMIN — Medication 2 SPRAY: at 10:13

## 2017-01-01 RX ADMIN — OXALIPLATIN 160 MG: 5 INJECTION, SOLUTION, CONCENTRATE INTRAVENOUS at 11:50

## 2017-01-01 RX ADMIN — FAMOTIDINE 20 MG: 10 INJECTION, SOLUTION INTRAVENOUS at 14:48

## 2017-01-01 RX ADMIN — Medication 10 ML: at 08:17

## 2017-01-01 RX ADMIN — HYDROCORTISONE SODIUM SUCCINATE 100 MG: 100 INJECTION, POWDER, FOR SOLUTION INTRAMUSCULAR; INTRAVENOUS at 10:12

## 2017-01-01 RX ADMIN — DEXTROSE MONOHYDRATE 720 MG: 5 INJECTION, SOLUTION INTRAVENOUS at 10:33

## 2017-01-01 RX ADMIN — ANTICOAGULANT SODIUM CITRATE SOLUTION 3 ML: 4 SOLUTION INTRAVENOUS at 10:39

## 2017-01-01 RX ADMIN — FAMOTIDINE 20 MG: 10 INJECTION, SOLUTION INTRAVENOUS at 08:48

## 2017-01-01 RX ADMIN — Medication 10 ML: at 08:00

## 2017-01-01 RX ADMIN — PALONOSETRON HYDROCHLORIDE 0.25 MG: 0.25 INJECTION INTRAVENOUS at 09:40

## 2017-01-01 RX ADMIN — Medication 20 ML: at 13:40

## 2017-01-01 RX ADMIN — PROPOFOL 45 MCG/KG/MIN: 10 INJECTION, EMULSION INTRAVENOUS at 02:59

## 2017-01-01 RX ADMIN — CHLORHEXIDINE GLUCONATE 15 ML: 1.2 RINSE ORAL at 08:19

## 2017-01-01 RX ADMIN — DEXTROSE MONOHYDRATE 325 MG: 5 INJECTION, SOLUTION INTRAVENOUS at 10:33

## 2017-01-01 RX ADMIN — SODIUM CHLORIDE 50 ML/HR: 900 INJECTION, SOLUTION INTRAVENOUS at 07:58

## 2017-01-01 RX ADMIN — METHYLPREDNISOLONE SODIUM SUCCINATE 125 MG: 125 INJECTION, POWDER, FOR SOLUTION INTRAMUSCULAR; INTRAVENOUS at 14:11

## 2017-01-01 RX ADMIN — FENTANYL CITRATE 50 MCG: 50 INJECTION, SOLUTION INTRAMUSCULAR; INTRAVENOUS at 15:21

## 2017-01-01 RX ADMIN — PROPOFOL 50 MG: 10 INJECTION, EMULSION INTRAVENOUS at 15:15

## 2017-01-01 RX ADMIN — MORPHINE SULFATE 5 MG: 20 SOLUTION ORAL at 21:06

## 2017-01-01 RX ADMIN — FLUOROURACIL 4510 MG: 50 INJECTION, SOLUTION INTRAVENOUS at 15:12

## 2017-01-01 RX ADMIN — ATROPINE SULFATE 0.4 MG: 0.4 INJECTION, SOLUTION INTRAMUSCULAR; INTRAVENOUS; SUBCUTANEOUS at 10:25

## 2017-01-01 RX ADMIN — PALONOSETRON HYDROCHLORIDE 0.25 MG: 0.25 INJECTION INTRAVENOUS at 09:34

## 2017-01-01 RX ADMIN — DEXTROSE MONOHYDRATE 160 MG: 50 INJECTION, SOLUTION INTRAVENOUS at 10:19

## 2017-01-01 RX ADMIN — Medication 10 ML: at 14:37

## 2017-01-01 RX ADMIN — Medication 10 ML: at 08:37

## 2017-01-01 RX ADMIN — METHYLPREDNISOLONE SODIUM SUCCINATE 40 MG: 40 INJECTION, POWDER, FOR SOLUTION INTRAMUSCULAR; INTRAVENOUS at 22:01

## 2017-01-01 RX ADMIN — Medication 20 ML: at 10:39

## 2017-01-01 RX ADMIN — SODIUM CHLORIDE 10 ML: 9 INJECTION, SOLUTION INTRAMUSCULAR; INTRAVENOUS; SUBCUTANEOUS at 08:35

## 2017-01-01 RX ADMIN — ONDANSETRON HYDROCHLORIDE 4 MG: 2 INJECTION, SOLUTION INTRAMUSCULAR; INTRAVENOUS at 15:36

## 2017-01-01 RX ADMIN — PROPOFOL 30 MCG/KG/MIN: 10 INJECTION, EMULSION INTRAVENOUS at 03:05

## 2017-01-01 RX ADMIN — DEXAMETHASONE SODIUM PHOSPHATE 12 MG: 4 INJECTION, SOLUTION INTRA-ARTICULAR; INTRALESIONAL; INTRAMUSCULAR; INTRAVENOUS; SOFT TISSUE at 09:44

## 2017-01-01 RX ADMIN — SODIUM CHLORIDE 10 ML: 9 INJECTION, SOLUTION INTRAMUSCULAR; INTRAVENOUS; SUBCUTANEOUS at 08:51

## 2017-01-01 RX ADMIN — Medication 80 MCG: at 15:28

## 2017-01-01 RX ADMIN — LEUCOVORIN CALCIUM 750 MG: 350 INJECTION, POWDER, LYOPHILIZED, FOR SOLUTION INTRAMUSCULAR; INTRAVENOUS at 10:48

## 2017-01-01 RX ADMIN — SODIUM CHLORIDE 25 ML/HR: 900 INJECTION, SOLUTION INTRAVENOUS at 11:40

## 2017-01-01 RX ADMIN — FLUOROURACIL 750 MG: 50 INJECTION, SOLUTION INTRAVENOUS at 12:58

## 2017-01-01 RX ADMIN — Medication 10 ML: at 05:52

## 2017-01-01 RX ADMIN — Medication 10 ML: at 15:18

## 2017-01-01 RX ADMIN — Medication 10 ML: at 22:02

## 2017-01-01 RX ADMIN — SODIUM CHLORIDE 10 ML: 9 INJECTION, SOLUTION INTRAMUSCULAR; INTRAVENOUS; SUBCUTANEOUS at 09:57

## 2017-01-01 RX ADMIN — FAMOTIDINE 20 MG: 10 INJECTION, SOLUTION INTRAVENOUS at 08:14

## 2017-01-01 RX ADMIN — FLUOROURACIL 720 MG: 2.5 INJECTION, SOLUTION INTRAVENOUS at 12:35

## 2017-01-01 RX ADMIN — SODIUM CHLORIDE, SODIUM LACTATE, POTASSIUM CHLORIDE, CALCIUM CHLORIDE: 600; 310; 30; 20 INJECTION, SOLUTION INTRAVENOUS at 10:13

## 2017-01-01 RX ADMIN — ATROPINE SULFATE 0.4 MG: 0.4 INJECTION, SOLUTION INTRAMUSCULAR; INTRAVENOUS; SUBCUTANEOUS at 10:01

## 2017-01-01 RX ADMIN — LORAZEPAM 0.5 MG: 2 SOLUTION, CONCENTRATE ORAL at 18:28

## 2017-01-01 RX ADMIN — Medication 30 ML: at 05:56

## 2017-01-01 RX ADMIN — Medication 3 ML: at 16:05

## 2017-01-01 RX ADMIN — FLUOROURACIL 4510 MG: 50 INJECTION, SOLUTION INTRAVENOUS at 14:13

## 2017-01-01 RX ADMIN — FLUOROURACIL 720 MG: 2.5 INJECTION, SOLUTION INTRAVENOUS at 12:55

## 2017-01-01 RX ADMIN — SODIUM CHLORIDE, SODIUM LACTATE, POTASSIUM CHLORIDE, CALCIUM CHLORIDE: 600; 310; 30; 20 INJECTION, SOLUTION INTRAVENOUS at 15:00

## 2017-01-01 RX ADMIN — DEXAMETHASONE SODIUM PHOSPHATE 12 MG: 4 INJECTION, SOLUTION INTRA-ARTICULAR; INTRALESIONAL; INTRAMUSCULAR; INTRAVENOUS; SOFT TISSUE at 09:27

## 2017-01-01 RX ADMIN — GLYCOPYRROLATE 0.2 MG: 0.2 INJECTION INTRAMUSCULAR; INTRAVENOUS at 10:12

## 2017-01-01 RX ADMIN — Medication 3 ML: at 11:27

## 2017-01-01 RX ADMIN — ONDANSETRON 4 MG: 4 TABLET, ORALLY DISINTEGRATING ORAL at 10:30

## 2017-01-01 RX ADMIN — DEXTROSE MONOHYDRATE 25 ML/HR: 5 INJECTION, SOLUTION INTRAVENOUS at 09:24

## 2017-01-01 RX ADMIN — PROPOFOL 35 MCG/KG/MIN: 10 INJECTION, EMULSION INTRAVENOUS at 14:11

## 2017-01-01 RX ADMIN — SODIUM CHLORIDE 50 ML/HR: 900 INJECTION, SOLUTION INTRAVENOUS at 10:00

## 2017-01-01 RX ADMIN — ETOMIDATE 20 MG: 2 INJECTION INTRAVENOUS at 15:10

## 2017-01-01 RX ADMIN — DIPHENHYDRAMINE HYDROCHLORIDE 25 MG: 50 INJECTION, SOLUTION INTRAMUSCULAR; INTRAVENOUS at 16:01

## 2017-01-01 RX ADMIN — FLUOROURACIL 750 MG: 50 INJECTION, SOLUTION INTRAVENOUS at 12:54

## 2017-01-01 RX ADMIN — DEXTROSE MONOHYDRATE 25 ML/HR: 5 INJECTION, SOLUTION INTRAVENOUS at 09:33

## 2017-01-01 RX ADMIN — DIPHENHYDRAMINE HYDROCHLORIDE 25 MG: 50 INJECTION, SOLUTION INTRAMUSCULAR; INTRAVENOUS at 05:49

## 2017-01-01 RX ADMIN — FAMOTIDINE 20 MG: 10 INJECTION, SOLUTION INTRAVENOUS at 20:16

## 2017-01-01 RX ADMIN — DEXAMETHASONE SODIUM PHOSPHATE 12 MG: 4 INJECTION, SOLUTION INTRAMUSCULAR; INTRAVENOUS at 09:17

## 2017-01-01 RX ADMIN — ROCURONIUM BROMIDE 50 MG: 10 INJECTION, SOLUTION INTRAVENOUS at 15:14

## 2017-01-01 RX ADMIN — SODIUM CHLORIDE 10 ML: 9 INJECTION INTRAMUSCULAR; INTRAVENOUS; SUBCUTANEOUS at 08:35

## 2017-01-01 RX ADMIN — MORPHINE SULFATE 10 MG: 20 SOLUTION ORAL at 16:17

## 2017-01-01 RX ADMIN — SODIUM CHLORIDE 10 ML: 9 INJECTION INTRAMUSCULAR; INTRAVENOUS; SUBCUTANEOUS at 08:16

## 2017-01-01 RX ADMIN — Medication 10 ML: at 08:35

## 2017-01-01 RX ADMIN — BARIUM SULFATE 900 ML: 21 SUSPENSION ORAL at 08:00

## 2017-01-01 RX ADMIN — BEVACIZUMAB 380 MG: 400 INJECTION, SOLUTION INTRAVENOUS at 12:00

## 2017-01-01 RX ADMIN — CHLORHEXIDINE GLUCONATE 15 ML: 1.2 RINSE ORAL at 20:17

## 2017-01-01 RX ADMIN — METHYLPREDNISOLONE SODIUM SUCCINATE 40 MG: 125 INJECTION, POWDER, FOR SOLUTION INTRAMUSCULAR; INTRAVENOUS at 05:49

## 2017-01-01 RX ADMIN — PROPOFOL 25 MCG/KG/MIN: 10 INJECTION, EMULSION INTRAVENOUS at 12:40

## 2017-01-01 RX ADMIN — DEXTROSE MONOHYDRATE 25 ML/HR: 5 INJECTION, SOLUTION INTRAVENOUS at 09:17

## 2017-01-01 RX ADMIN — Medication 3 ML: at 15:16

## 2017-01-01 RX ADMIN — CHLORHEXIDINE GLUCONATE 15 ML: 1.2 RINSE ORAL at 21:23

## 2017-01-01 RX ADMIN — LEUCOVORIN CALCIUM 750 MG: 350 INJECTION, POWDER, LYOPHILIZED, FOR SOLUTION INTRAMUSCULAR; INTRAVENOUS at 10:26

## 2017-01-01 RX ADMIN — DEXAMETHASONE SODIUM PHOSPHATE 12 MG: 4 INJECTION, SOLUTION INTRA-ARTICULAR; INTRALESIONAL; INTRAMUSCULAR; INTRAVENOUS; SOFT TISSUE at 09:42

## 2017-01-01 RX ADMIN — FENTANYL CITRATE 50 MCG: 50 INJECTION, SOLUTION INTRAMUSCULAR; INTRAVENOUS at 15:15

## 2017-01-01 RX ADMIN — FENTANYL CITRATE 50 MCG: 50 INJECTION, SOLUTION INTRAMUSCULAR; INTRAVENOUS at 07:24

## 2017-01-01 RX ADMIN — Medication 80 MCG: at 15:33

## 2017-01-01 RX ADMIN — Medication 10 ML: at 08:51

## 2017-01-01 RX ADMIN — FAMOTIDINE 20 MG: 10 INJECTION, SOLUTION INTRAVENOUS at 14:11

## 2017-01-01 RX ADMIN — POTASSIUM PHOSPHATE, MONOBASIC AND POTASSIUM PHOSPHATE, DIBASIC: 224; 236 INJECTION, SOLUTION INTRAVENOUS at 12:11

## 2017-01-01 RX ADMIN — SODIUM CHLORIDE 25 ML/HR: 900 INJECTION, SOLUTION INTRAVENOUS at 11:58

## 2017-01-01 RX ADMIN — METHYLPREDNISOLONE SODIUM SUCCINATE 40 MG: 40 INJECTION, POWDER, FOR SOLUTION INTRAMUSCULAR; INTRAVENOUS at 21:32

## 2017-01-01 RX ADMIN — Medication 10 ML: at 08:33

## 2017-01-01 RX ADMIN — Medication 10 ML: at 15:16

## 2017-01-01 RX ADMIN — FAMOTIDINE 20 MG: 10 INJECTION, SOLUTION INTRAVENOUS at 23:20

## 2017-01-01 RX ADMIN — SODIUM CHLORIDE 1000 ML: 900 INJECTION, SOLUTION INTRAVENOUS at 15:53

## 2017-01-01 RX ADMIN — SODIUM CHLORIDE 1000 ML: 900 INJECTION, SOLUTION INTRAVENOUS at 14:12

## 2017-01-01 RX ADMIN — OXALIPLATIN 160 MG: 5 INJECTION, SOLUTION, CONCENTRATE INTRAVENOUS at 10:29

## 2017-01-01 RX ADMIN — MORPHINE SULFATE 10 MG: 20 SOLUTION ORAL at 18:54

## 2017-01-01 RX ADMIN — MORPHINE SULFATE 10 MG: 20 SOLUTION ORAL at 13:10

## 2017-01-01 RX ADMIN — PALONOSETRON HYDROCHLORIDE 0.25 MG: 0.25 INJECTION INTRAVENOUS at 09:36

## 2017-01-01 RX ADMIN — DIPHENHYDRAMINE HYDROCHLORIDE 25 MG: 50 INJECTION, SOLUTION INTRAMUSCULAR; INTRAVENOUS at 00:54

## 2017-01-01 RX ADMIN — PANITUMUMAB 460 MG: 400 SOLUTION INTRAVENOUS at 13:41

## 2017-01-01 RX ADMIN — PANITUMUMAB 425 MG: 400 SOLUTION INTRAVENOUS at 12:40

## 2017-01-01 RX ADMIN — Medication 3 ML: at 14:35

## 2017-01-01 RX ADMIN — Medication 10 ML: at 21:30

## 2017-01-01 RX ADMIN — Medication 10 ML: at 11:26

## 2017-01-01 RX ADMIN — CHLORHEXIDINE GLUCONATE 15 ML: 1.2 RINSE ORAL at 18:09

## 2017-01-01 RX ADMIN — IRINOTECAN HYDROCHLORIDE 325 MG: 20 INJECTION, SOLUTION INTRAVENOUS at 09:50

## 2017-01-01 RX ADMIN — PROPOFOL 25 MCG/KG/MIN: 10 INJECTION, EMULSION INTRAVENOUS at 07:24

## 2017-01-01 RX ADMIN — RACEPINEPHRINE HYDROCHLORIDE 0.25 ML: 11.25 SOLUTION RESPIRATORY (INHALATION) at 14:58

## 2017-01-01 RX ADMIN — PROPOFOL 50 MCG/KG/MIN: 10 INJECTION, EMULSION INTRAVENOUS at 19:22

## 2017-01-01 RX ADMIN — ONDANSETRON 4 MG: 4 TABLET, ORALLY DISINTEGRATING ORAL at 23:09

## 2017-01-01 RX ADMIN — SODIUM CHLORIDE 50 ML/HR: 900 INJECTION, SOLUTION INTRAVENOUS at 03:00

## 2017-01-01 RX ADMIN — FLUOROURACIL 750 MG: 50 INJECTION, SOLUTION INTRAVENOUS at 13:40

## 2017-01-01 RX ADMIN — PROPOFOL 25 MCG/KG/MIN: 10 INJECTION, EMULSION INTRAVENOUS at 08:47

## 2017-01-01 RX ADMIN — POTASSIUM CHLORIDE 10 MEQ: 10 INJECTION, SOLUTION INTRAVENOUS at 17:22

## 2017-01-01 RX ADMIN — PALONOSETRON HYDROCHLORIDE 0.25 MG: 0.25 INJECTION INTRAVENOUS at 09:24

## 2017-01-01 RX ADMIN — FLUOROURACIL 750 MG: 50 INJECTION, SOLUTION INTRAVENOUS at 12:00

## 2017-01-01 RX ADMIN — FENTANYL CITRATE 50 MCG: 50 INJECTION, SOLUTION INTRAMUSCULAR; INTRAVENOUS at 23:41

## 2017-01-01 RX ADMIN — FAMOTIDINE 20 MG: 10 INJECTION, SOLUTION INTRAVENOUS at 22:01

## 2017-01-01 RX ADMIN — Medication 3 ML: at 13:20

## 2017-01-01 RX ADMIN — SODIUM CHLORIDE 25 ML/HR: 900 INJECTION, SOLUTION INTRAVENOUS at 12:45

## 2017-01-01 RX ADMIN — HYDROMORPHONE HYDROCHLORIDE 0.2 MG: 2 INJECTION, SOLUTION INTRAMUSCULAR; INTRAVENOUS; SUBCUTANEOUS at 10:20

## 2017-01-01 RX ADMIN — Medication 5 ML: at 00:55

## 2017-01-01 RX ADMIN — RACEPINEPHRINE HYDROCHLORIDE: 11.25 SOLUTION RESPIRATORY (INHALATION) at 14:32

## 2017-01-01 RX ADMIN — CHLORHEXIDINE GLUCONATE 15 ML: 1.2 RINSE ORAL at 08:47

## 2017-01-01 RX ADMIN — LEUCOVORIN CALCIUM 750 MG: 100 INJECTION, POWDER, LYOPHILIZED, FOR SOLUTION INTRAMUSCULAR; INTRAVENOUS at 10:16

## 2017-01-01 RX ADMIN — SODIUM CHLORIDE 1000 ML: 900 INJECTION, SOLUTION INTRAVENOUS at 17:52

## 2017-01-11 NOTE — PROGRESS NOTES
Patient: Omayra Mora MRN: [id number]  SSN: [id number]    YOB: 1945  Age: 70 y.o. Sex: female        Diagnosis:     1. Metastatic recurrent colon carcinoma, 3/2016   HOLLIS wild type, Her 2 mutated, MSI stable    2. Local recurrent of colon carcinoma, 01/2016   T4b N1c,Tumor deposits: Present, all 9 LN -ve    3. Colon adenocarcinoma:    T4a N0 M0 (Stage II); all 25 LN clear of disease       Treatment:      1. Systemic chemotherapy (started 4/13/2016)   FOLFIRI, s/p 3 cycles    Irinotecan reduced 25% d/t neutropenia (5/11/2016)   FOLFIRI + Avastin (started 6/8/2016), s/p 5 cycles    Irinotecan reduced 50% d/t neutropenia    5-FU, s/p 2 cycles (stopped 9/7/2016_              FOLFIRI + Avastin - restarted - Cycle 4 Day 1    2. Laparoscopic assisted resection of abdominal mass with ileocolic and small bowel resection 01/21/2016  3. Adjuvant mFOLFOX    (Between 07/09 - 12/31/2014)  4. Partial colectomy 05/23/2013       Subjective:      Omayra Mora is a 79 y.o. female with metastatic colon adenocarcinoma. She was initially diagnosed with high risk Stage II disease and underwent a partial colectomy on 05/23/2013. All 25 LN were uninvolved with disease but the tumor penetrated the serosa into the pericolic fat and there was focal invasion in the appendix as well as the small bowel. Ms. Dev Joseph completed 6 months of adjuvant mFOLFOX . She had recurrent disease in the abdomen around the previous site of disease. She underwent laparoscopic resection of the small bowel and colon in Jan 2016. The tumor was found to perforate the small bowel and there was some metastatic deposit. Ms. Dev Joseph experienced abdominal pain and subsequent CT showed recurrence of disease in the liver, peritoneum, and ovaries. She is receiving FOLFIRI + Avastin. She feels well and the pain in her RUQ has gone. Her only complaint is intermittent hand swelling.       Review of Systems:     Constitutional: negative  Eyes: negative Ears, Nose, Mouth, Throat, and Face: negative   Respiratory: negative   Cardiovascular: negative   Gastrointestinal: RUQ pain is better  Integument/Breast: negative  Hematologic/Lymphatic: negative   Musculoskeletal:negative   Neurological: negative         Past Medical History   Diagnosis Date    Anemia 5-3-13     Hgb 7.4         received transfuion of two units    Anemia NEC     Arthritis      mild- low back    Cancer (HCC)      colon cancer    GERD (gastroesophageal reflux disease)      at times    Liver disease      spots on liver    Neuropathy     Thyroid disease      nodules     Past Surgical History   Procedure Laterality Date    Hx  section      Hx cervical fusion       anterior cervical disc and fusion    Hx gi  13     COLON RESECTION LAPAROSCOPIC RIGHT     Pr abdomen surgery proc unlisted  16     lap resection of abdominal mass with ileocolic/small bowel resection.  Hx vascular access       Portacath    Colonoscopy N/A 2016     COLONOSCOPY performed by Keri Granado MD at \[facility]\"" ENDOSCOPY      Family History   Problem Relation Age of Onset   Julian Yamilet Cancer Sister      colon    Colon Cancer Sister     Diabetes Mother     Heart Disease Father     Hypertension Father     Stroke Father     Diabetes Father     Colon Cancer Sister     Cancer Sister      lung     Social History   Substance Use Topics    Smoking status: Former Smoker     Packs/day: 1.00     Years: 20.00     Quit date: 1988    Smokeless tobacco: Never Used      Comment: quit 30 yrs ago    Alcohol use 7.0 oz/week     14 Cans of beer per week      Comment: couple beers/day      Prior to Admission medications    Medication Sig Start Date End Date Taking? Authorizing Provider   oxyCODONE IR (ROXICODONE) 5 mg immediate release tablet Take 1 Tab by mouth every four (4) hours as needed for Pain.  Max Daily Amount: 30 mg. 16  Yes Anne Marie Rice NP   acetaminophen (TYLENOL) 500 mg tablet Take by mouth every six (6) hours as needed for Pain. Yes Historical Provider          Allergies   Allergen Reactions    Beef Containing Products Anaphylaxis    Heparin (Porcine) Hives     Heparin beef starts with hives and can lead to anaphylatic shock    Iodinated Contrast Media - Oral And Iv Dye Rash, Nausea and Vomiting and Other (comments)     Abdominal pain    Pork/Porcine Containing Products Anaphylaxis    Bactrim [Sulfamethoprim Ds] Hives    Erythromycin Hives and Itching    Ciprofloxacin Unknown (comments)     Splitting headache    Macrobid [Nitrofurantoin Monohyd/M-Cryst] Other (comments)     Headaches, bad dreams           Objective:     Vitals:    01/11/17 0837   BP: 114/76   Pulse: 74   Resp: 18   Temp: 97.4 °F (36.3 °C)   SpO2: 99%   Weight: 169 lb (76.7 kg)   Height: 5' 5\" (1.651 m)          Physical Exam:    GENERAL: alert, cooperative   EYE: conjunctivae/corneas clear. LYMPHATIC: Cervical, supraclavicular, and axillary nodes normal.   THROAT & NECK: normal and no erythema or exudates noted. LUNG: clear to auscultation bilaterally   HEART: regular rate and rhythm   ABDOMEN: soft, non-tender. EXTREMITIES: extremities normal   SKIN: Normal.   NEUROLOGIC: negative         IMAGING:    CT Results (most recent):    Results from Hospital Encounter encounter on 11/16/16   CT ABD PELV W CONT   Narrative INDICATION: Metastatic colon cancer, restaging. Exam: CT of the chest, abdomen and pelvis is performed with 5 mm collimation. The study is performed with p.o. and 100 mL of nonionic IV Isovue-370. Noncontrast images were not performed. Sagittal and coronal reformatted images  were also obtained. CT dose reduction was achieved through use of a standardized protocol tailored  for this examination and automatic exposure control for dose modulation. Direct comparison is made to prior CT dated September 2016. Findings:    Chest: Central venous port catheter extends to the distal SVC.  There is no  pulmonary nodule or mass. There is minimal right basilar dependent atelectasis. There is no axillary, mediastinal or hilar lymphadenopathy. There are several  new subcentimeter right cardiophrenic lymph nodes. Subcentimeter mediastinal  lymph nodes are unchanged. Pleural spaces are normal. Heart is of normal size  and there is no pericardial effusion. Abdomen: There are multiple suspicious appearing hepatic lesions which have  increased in size and number compared to prior CT dated September 2016. For  example, within the upper right hepatic lobe, there is a 3 cm x 3.4 cm lesion,  measuring 1.5 cm x 1.3 cm on prior CT dated September 2016. As an additional  example, there is a 1.8 cm x 1.5 cm lesion within the caudate lobe of the liver,  new compared to prior CT dated September 2016. There has been interval worsening  of the omental soft tissue nodularity consistent with interval progression of  carcinomatosis. There has been interval increase in size of a 1.4 cm x 1 cm  retrocaval lymph node, measuring 8 mm x 4 mm on prior CT dated September 2016. The spleen, adrenals, kidneys, pancreas and gallbladder are normal. No thickened  or dilated loop of large or small bowel is visualized. Right hemicolectomy  postoperative changes are noted. There is no free intraperitoneal gas or fluid. Pelvis: Urinary bladder is partially filled and grossly normal. The osseous  structures are stable. There is no free fluid in the pelvis. Right ovary is  somewhat ill-defined and has increased in size, measuring 4.6 cm x 3.3 cm and  measuring 3.5 cm x 2.4 cm on prior CT dated September 2016. There are several  new soft tissue nodules within the pelvis. Impression IMPRESSION:   1. Interval progression of hepatic metastatic disease, as described above. 2. Interval progression of peritoneal carcinomatosis. 3. Several new subcentimeter cardiophrenic lymph nodes.             LABS:  Lab Results   Component Value Date/Time    WBC 7.7 01/11/2017 08:30 AM    Hemoglobin (POC) 13.3 01/11/2017 08:36 AM    HGB 12.2 01/11/2017 08:30 AM    Hematocrit (POC) 39 01/11/2017 08:36 AM    HCT 37.7 01/11/2017 08:30 AM    PLATELET 396 66/64/9614 08:30 AM    MCV 89.3 01/11/2017 08:30 AM         Assessment:      1. Metastatic recurrent colon carcinoma, 3/2016   HOLLIS wild type, Her 2 mutated, MSI stable    2. Local recurrent of colon carcinoma, 01/2016   T4b N1c,Tumor deposits: Present, all 9 LN -ve    3. Initially diagnosed Colon adenocarcinoma: 05/2013  T4a N0 M0 (Stage II); all 25 LN clear of disease   High risk features - T4a, serosal penetration, involvement of adjacent organs - small bowel and appendix, moderate to poor differentiation     ECOG PS 0  Intent of treatment - palliative    systemic chemotherapy   FOLFIRI, s/p 3 cycles    Irinotecan reduced 25% d/t neutropenia (5/11/2016)   FOLFIRI + Avastin s/p 8 Cycles     Irinotecan reduced 50% d/t neutropenia (7/6/2016)   5-FU s/p 2 cycles     Was off therapy from 09/2016 - 11/2016  CT Chest, Abd, Pelvis (11/16/2016) shows progression of disease in the peritoneum and liver  Therapy resumed    Receiving FOLFIRI/Avastin - Cycle 4 Day 1  Tolerating treatment very well  Denies any side effects. A detailed system by system evaluation of side effect was performed to assess chemotherapy related toxicity. Blood counts are acceptable. Results reviewed with the patient    Symptom management form reviewed with patient. Since she had IV contrast related adverse effects, we shall pre-medicate her anti-cholinergics, anti-emetics and steroid        Plan:        > Continue FOLFIRI + Avastin  > Repeat CT scans - pre-medicate   > Follow-up in 2 weeks        Signed by: Jumana Bartlett MD                     January 11, 2017          CC. Yoav Lewis MD   CC. Homero Thomas MD   CC.  Isiah Serrano MD

## 2017-01-11 NOTE — PATIENT INSTRUCTIONS
Please take zofran, benadryl, and prednisone prior to your CT scan as detailed on your instruction sheet.

## 2017-01-11 NOTE — PROGRESS NOTES
Madonna Barakat is a 70 y.o. female here for follow up had concerns including Colon Cancer and Follow-up. , patient receiving Folfiri/Avastin on cycle 4. C/o, some swelling to hands occasionally. Ms. Mireya Pinto has a reminder for a \"due or due soon\" health maintenance. I have asked that she contact her primary care provider for follow-up on this health maintenance.

## 2017-01-11 NOTE — MR AVS SNAPSHOT
Visit Information Date & Time Provider Department Dept. Phone Encounter #  
 1/11/2017  8:30 AM Leia Wills NP [facility] at [address] [place], [place] [address]way [id number] Your Appointments 1/25/2017  8:30 AM  
ESTABLISHED PATIENT with Pee Schwartz MD  
[facility] at [facility]) Appt Note: 2 wk f/u  
 [address] [facility] [address] [phone]  
[address] [place] [address] [address] Jesus Alberto Brennan Upcoming Health Maintenance Date Due Hepatitis C Screening 1945 DTaP/Tdap/Td series (1 - Tdap) 9/17/1966 ZOSTER VACCINE AGE 60> 9/17/2005 GLAUCOMA SCREENING Q2Y 9/17/2010 OSTEOPOROSIS SCREENING (DEXA) 9/17/2010 MEDICARE YEARLY EXAM 9/17/2010 INFLUENZA AGE 9 TO ADULT 8/1/2016 Pneumococcal 65+ High/Highest Risk (2 of 2 - PPSV23) 12/27/2016 BREAST CANCER SCRN MAMMOGRAM 3/9/2017 FOBT Q 1 YEAR AGE 50-75 8/21/2017 Allergies as of 1/11/2017  Review Complete On: 1/11/2017 By: Ever Gilliland LPN Severity Noted Reaction Type Reactions Beef Containing Products High 01/21/2016    Anaphylaxis Heparin (Porcine) High 11/11/2016    Hives Heparin beef starts with hives and can lead to anaphylatic shock Iodinated Contrast Media - Oral And Iv Dye High 11/16/2016    Rash, Nausea and Vomiting, Other (comments) Abdominal pain Pork/porcine Containing Products High 01/21/2016    Anaphylaxis Bactrim [Sulfamethoprim Ds] Medium 05/01/2013   Side Effect Hives Erythromycin Medium 05/01/2013   Side Effect Hives, Itching Ciprofloxacin  05/01/2013   Intolerance Unknown (comments) Splitting headache Macrobid [Nitrofurantoin Monohyd/m-cryst]  05/01/2015    Other (comments) Headaches, bad dreams Current Immunizations  Reviewed on 1/11/2017 Name Date Pneumococcal Conjugate (PCV-13) 11/1/2016 Reviewed by Juaquin Porter LPN on 6/71/3422 at  8:36 AM  
You Were Diagnosed With   
  
 Codes Comments Metastatic colon cancer in female [facility])    -  Primary ICD-10-CM: C78.5 ICD-9-CM: 197.5 Vitals BP Pulse Temp Resp Height(growth percentile) Weight(growth percentile) 114/76 74 97.4 °F (36.3 °C) 18 5' 5\" (1.651 m) 169 lb (76.7 kg) SpO2 BMI OB Status Smoking Status 99% 28.12 kg/m2 Postmenopausal Former Smoker Vitals History BMI and BSA Data Body Mass Index Body Surface Area  
 28.12 kg/m 2 1.88 m 2 Preferred Pharmacy Pharmacy Name Phone [id number] [address] [place] [address] [facility] [phone] Your Updated Medication List  
  
   
This list is accurate as of: 1/11/17  9:21 AM.  Always use your most recent med list.  
  
  
  
  
 acetaminophen 500 mg tablet Commonly known as:  TYLENOL Take  by mouth every six (6) hours as needed for Pain. oxyCODONE IR 5 mg immediate release tablet Commonly known as:  Charolet Music Take 1 Tab by mouth every four (4) hours as needed for Pain. Max Daily Amount: 30 mg. To-Do List   
 01/13/2017  3:00 PM  
  Appointment with CECELIA FAST TRACK/FLOAT NURSE at [facility] ([phone])  
  
 01/25/2017 8:00 AM  
  Appointment with [facility] at [facility] ([phone])  
  
 01/27/2017 3:00 PM  
  Appointment with CECELIA FAST TRACK/FLOAT NURSE at [facility] ([phone])  
  
 02/08/2017 8:00 AM  
  Appointment with [facility] [place] [facility] at [facility] ([phone])  
  
 02/10/2017 3:00 PM  
  Appointment with CECELIA FAST TRACK/FLOAT NURSE at [facility] ([phone]) [facility]! Dear Kyle Mullen: 
Thank you for requesting a [employer] account. Our records indicate that you already have an active [employer] account. You can access your account anytime at [email or web address] Did you know that you can access your hospital and ER discharge instructions at any time in [employer]? You can also review all of your test results from your hospital stay or ER visit. Additional Information If you have questions, please visit the Frequently Asked Questions section of the [employer] website at [email or web address] Remember, [employer] is NOT to be used for urgent needs. For medical emergencies, dial 911. Now available from your iPhone and Android! Please provide this summary of care documentation to your next provider. Your primary care clinician is listed as Bairon Huynh. If you have any questions after today's visit, please call [phone].

## 2017-01-11 NOTE — PROGRESS NOTES
Pt arrived to [facility] ambulatory in no acute distress at 0815 for [facility] C[facility].  Assessment unremarkable. R chest port accessed without issue and positive blood return noted.  Labs obtained- CBCap, Istat, UA, and urine culture. Pt to see Dr. Kyle Wiseman today. Visit Vitals    /50 (BP 1 Location: Left arm, BP Patient Position: Sitting)    Pulse 83    Temp 97.7 °F (36.5 °C)    Resp 18    Ht 5' 5\" (1.651 m)    Wt 76.7 kg (169 lb 3.2 oz)    SpO2 97%    BMI 28.16 kg/m2       The following medications administered:  D5 @ KVO  Aloxi 0.25 mg IVP  Decadron 12 mg IVP  Irinotecan 170 mg IV run concurrently with Leucovorin 750 mg IV over 90 mins  NS @ KVO  5-FU  mg   Avastin 380 mg over 30 mins  5-FU chemo pump 4,485 mg over 46 hrs    Recent Results (from the past 8 hour(s))   URINALYSIS W/ REFLEX CULTURE    Collection Time: 01/11/17  8:23 AM   Result Value Ref Range    Color YELLOW/STRAW      Appearance CLEAR CLEAR      Specific gravity 1.012 1.003 - 1.030      pH (UA) 5.5 5.0 - 8.0      Protein NEGATIVE  NEG mg/dL    Glucose NEGATIVE  NEG mg/dL    Ketone NEGATIVE  NEG mg/dL    Bilirubin NEGATIVE  NEG      Blood NEGATIVE  NEG      Urobilinogen 0.2 0.2 - 1.0 EU/dL    Nitrites NEGATIVE  NEG      Leukocyte Esterase SMALL (A) NEG      WBC 5-10 0 - 4 /hpf    RBC 0-5 0 - 5 /hpf    Epithelial cells FEW FEW /lpf    Bacteria 3+ (A) NEG /hpf    UA:UC IF INDICATED URINE CULTURE ORDERED (A) CNI      Hyaline Cast 0-2 0 - 5 /lpf   CBC WITH 3 PART DIFF    Collection Time: 01/11/17  8:30 AM   Result Value Ref Range    WBC 7.7 3.6 - 11.0 K/uL    RBC 4.22 3.80 - 5.20 M/uL    HGB 12.2 11.5 - 16.0 g/dL    HCT 37.7 35.0 - 47.0 %    MCV 89.3 80.0 - 99.0 FL    MCH 28.9 26.0 - 34.0 PG    MCHC 32.4 30.0 - 36.5 g/dL    RDW 16.5 (H) 11.8 - 15.8 %    PLATELET 136 156 - 077 K/uL    NEUTROPHILS 77 (H) 32 - 75 %    MIXED CELLS 8 3.2 - 16.9 %    LYMPHOCYTES 15 12 - 49 %    ABS. NEUTROPHILS 6.0 1.8 - 8.0 K/UL    ABS.  MIXED CELLS 0.6 0.2 - 1.2 K/uL    ABS. LYMPHOCYTES 1.1 0.8 - 3.5 K/UL    DF AUTOMATED     POC CHEM8    Collection Time: 01/11/17  8:36 AM   Result Value Ref Range    Calcium, ionized (POC) 1.18 1.12 - 1.32 MMOL/L    Sodium (POC) 142 136 - 145 MMOL/L    Potassium (POC) 3.6 3.5 - 5.1 MMOL/L    Chloride (POC) 101 98 - 107 MMOL/L    CO2 (POC) 28 21 - 32 MMOL/L    Anion gap (POC) 17 (H) 5 - 15 mmol/L    Glucose (POC) 109 (H) 65 - 105 MG/DL    BUN (POC) 13 9 - 20 MG/DL    Creatinine (POC) 0.6 0.6 - 1.3 MG/DL    GFR-AA (POC) >60 >60 ml/min/1.73m2    GFR, non-AA (POC) >60 >60 ml/min/1.73m2    Hemoglobin (POC) 13.3 11.5 - 16.0 GM/DL    Hematocrit (POC) 39 35.0 - 47.0 %    Comment Notified RN or MD immediately by        3+ bacteria found in UA (pt denies s/sx for UTI)--urine culture sent and results pending. Please see [facility] for results. Visit Vitals    /77    Pulse 61    Temp 97.7 °F (36.5 °C)    Resp 18    Ht 5' 5\" (1.651 m)    Wt 76.7 kg (169 lb 3.2 oz)    SpO2 97%    Breastfeeding No    BMI 28.16 kg/m2       Pt tolerated treatment well.  Port flushed per policy and chemo pump infusing.  Pt discharged ambulatory in no acute distress at 1300, accompanied by self.   Next appointment 1/13/17 at 3 PM for pump removal.

## 2017-01-17 NOTE — TELEPHONE ENCOUNTER
Received a call from pt. HIPAA verified. Pt reports she had an episode of bright red blood with her bowel movement. She did have some straining with this bowel movement. She is not leaking blood during or throughout the day however. I advised her that her lab values were fine however a week ago. Also advised her URINALYSIS had no culture growth. She stated understanding.

## 2017-01-23 NOTE — ROUTINE PROCESS
Patient re-evaluated by Dr. Elias Curran. Patient feels well enough to go home. Patient instructed by RN. Understands instructions. Patient discharged with written instructions, all her belongings and son. Son will drive her home. COD:  Stable.

## 2017-01-23 NOTE — PROGRESS NOTES
Received patient in xray recovery post IV contrast.  Patient was premedicated and her last dose of Benadryl 50 mg po was at 0700 today. Skin is red. Patient reports nausea. States last reaction was like this only her SBP dropped to the 80's. Chest clear. Evaluated by Dr. Danny Cade. Observe.

## 2017-01-23 NOTE — DISCHARGE INSTRUCTIONS
[address]  Special Procedures/Radiology Department    Radiologist:  Dr. Eileen Collazo    Date:  January 23, 2017      Allergic reaction to Radiology Contrast    You have had a reaction to X-ray dye. You will have this allergy for the rest of your life. Be sure to tell your physician especially if you have any further x-rays. Ask your physician to make a note on your chart about this allergy. You may take Benadryl 25 mg every 6 hours as needed for itching or rash. Be careful while taking Benadryl as it will make you sleepy. Do not drive or operate machinery while taking this drug. If you experience any shortness of breath, or feel as if your tongue is swelling, or if you have difficulty swallowing, please go to the nearest Emergency Room immediately. If you have any questions or concerns, please call [phone] and ask to speak to the nurse on-call.     Other:

## 2017-01-25 NOTE — ACP (ADVANCE CARE PLANNING)
Advance Care Planning (ACP) Provider Conversation Snapshot    Date of ACP Conversation: 01/25/17  Persons included in Conversation:  [address] [facility]  Length of ACP Conversation in minutes:  5 minutes. SW inquired if she had the adv directive form - Pt has been given the form but she has not completed. SW will provide another form for pt. SW reviewed the form. Pt divorce is not final but they have been  for significant time. Authorized Decision Maker (if patient is incapable of making informed decisions): This person is:              For Patients with Decision Making Capacity:       Conversation Outcomes / Follow-Up Plan:

## 2017-01-25 NOTE — PROGRESS NOTES
Patient: Marilyn Adler MRN: [id number]  SSN: [id number]    YOB: 1945  Age: 70 y.o. Sex: female        Diagnosis:     1. Metastatic recurrent colon carcinoma, 3/2016   HOLLIS wild type, Her 2 mutated, MSI stable    2. Local recurrent of colon carcinoma, 01/2016   T4b N1c,Tumor deposits: Present, all 9 LN -ve    3. Colon adenocarcinoma:    T4a N0 M0 (Stage II); all 25 LN clear of disease       Treatment:      1. Systemic chemotherapy (started 4/13/2016)   FOLFIRI, s/p 3 cycles    Irinotecan reduced 25% d/t neutropenia (5/11/2016)   FOLFIRI + Avastin (started 6/8/2016), s/p 5 cycles    Irinotecan reduced 50% d/t neutropenia    5-FU, s/p 2 cycles (stopped 9/7/2016_              FOLFIRI + Avastin - restarted - s/p 4 cycles    2. Laparoscopic assisted resection of abdominal mass with ileocolic and small bowel resection 01/21/2016  3. Adjuvant mFOLFOX    (Between 07/09 - 12/31/2014)  4. Partial colectomy 05/23/2013       Subjective:      Marilyn Adler is a 79 y.o. female with metastatic colon adenocarcinoma. She was initially diagnosed with high risk Stage II disease and underwent a partial colectomy on 05/23/2013. All 25 LN were uninvolved with disease but the tumor penetrated the serosa into the pericolic fat and there was focal invasion in the appendix as well as the small bowel. Ms. Chiquita Crowell completed 6 months of adjuvant mFOLFOX . She had recurrent disease in the abdomen around the previous site of disease. She underwent laparoscopic resection of the small bowel and colon in Jan 2016. The tumor was found to perforate the small bowel and there was some metastatic deposit. Ms. Chiquita Crowell experienced abdominal pain and subsequent CT showed recurrence of disease in the liver, peritoneum, and ovaries. She resumed chemotherapy with FOLFIRI + Avastin. Repeat scans show disease progression. She is here today to discuss scan results and treatment plan.  She had a reaction to the CT contrast again despite premedications. We will plan for MRI for repeat scans. Review of Systems:     Constitutional: negative  Eyes: negative   Ears, Nose, Mouth, Throat, and Face: negative   Respiratory: negative   Cardiovascular: negative   Gastrointestinal: RUQ pain is better  Integument/Breast: negative  Hematologic/Lymphatic: negative   Musculoskeletal:negative   Neurological: negative         Past Medical History   Diagnosis Date    Anemia 5-3-13     Hgb 7.4         received transfuion of two units    Anemia NEC     Arthritis      mild- low back    Cancer (HCC)      colon cancer    GERD (gastroesophageal reflux disease)      at times    Liver disease      spots on liver    Neuropathy     Thyroid disease      nodules     Past Surgical History   Procedure Laterality Date    Hx  section      Hx cervical fusion       anterior cervical disc and fusion    Hx gi  13     COLON RESECTION LAPAROSCOPIC RIGHT     Pr abdomen surgery proc unlisted  16     lap resection of abdominal mass with ileocolic/small bowel resection.  Hx vascular access       Portacath    Colonoscopy N/A 2016     COLONOSCOPY performed by Bartolo Oh MD at [facility] ENDOSCOPY      Family History   Problem Relation Age of Onset   [de-identified] Cancer Sister      colon    Colon Cancer Sister     Diabetes Mother     Heart Disease Father     Hypertension Father     Stroke Father     Diabetes Father     Colon Cancer Sister     Cancer Sister      lung     Social History   Substance Use Topics    Smoking status: Former Smoker     Packs/day: 1.00     Years: 20.00     Quit date: 1988    Smokeless tobacco: Never Used      Comment: quit 30 yrs ago    Alcohol use 7.0 oz/week     14 Cans of beer per week      Comment: couple beers/day      Prior to Admission medications    Medication Sig Start Date End Date Taking? Authorizing Provider   predniSONE (DELTASONE) 10 mg tablet Take 5 Tabs by mouth See Admin Instructions.  Take 50 mg 13, 7, and 1 hour prior to CT scan 1/11/17  Yes Adria Sierra MD   oxyCODONE IR (ROXICODONE) 5 mg immediate release tablet Take 1 Tab by mouth every four (4) hours as needed for Pain. Max Daily Amount: 30 mg. 11/23/16  Yes Ingrid Durand NP   acetaminophen (TYLENOL) 500 mg tablet Take  by mouth every six (6) hours as needed for Pain. Yes Historical Provider          Allergies   Allergen Reactions    Beef Containing Products Anaphylaxis    Heparin (Porcine) Hives     Heparin beef starts with hives and can lead to anaphylatic shock    Iodinated Contrast Media - Oral And Iv Dye Rash, Nausea and Vomiting and Other (comments)     Abdominal pain    Pork/Porcine Containing Products Anaphylaxis    Bactrim [Sulfamethoprim Ds] Hives    Erythromycin Hives and Itching    Ciprofloxacin Unknown (comments)     Splitting headache    Macrobid [Nitrofurantoin Monohyd/M-Cryst] Other (comments)     Headaches, bad dreams           Objective:     Vitals:    01/25/17 0839   BP: 123/76   Pulse: 68   Resp: 18   Temp: 97.5 °F (36.4 °C)   SpO2: 98%   Weight: 166 lb (75.3 kg)   Height: 5' 5\" (1.651 m)          Physical Exam:    GENERAL: alert, cooperative   EYE: conjunctivae/corneas clear. LYMPHATIC: Cervical, supraclavicular, and axillary nodes normal.   THROAT & NECK: normal and no erythema or exudates noted. LUNG: clear to auscultation bilaterally   HEART: regular rate and rhythm   ABDOMEN: soft, non-tender. EXTREMITIES: extremities normal   SKIN: Normal.   NEUROLOGIC: negative         IMAGING:    CT Results (most recent):    Results from Hospital Encounter encounter on 01/23/17   CT ABD PELV W CONT   Narrative INDICATION: Follow-up metastatic colon cancer. COMPARISON: 11/16/2016. TECHNIQUE:  Following the uneventful intravenous administration of 96 cc  Isovue-370, 5 mm axial images were obtained through the chest, abdomen, and  pelvis. Oral contrast was not administered.  CT dose reduction was achieved  through use of a standardized protocol tailored for this examination and  automatic exposure control for dose modulation. FINDINGS:    THYROID: No nodule. MEDIASTINUM: No change in subcentimeter pericardiophrenic lymph nodes. Otherwise  unremarkable. KENDALL: No mass or lymphadenopathy. THORACIC AORTA: No dissection or aneurysm. MAIN PULMONARY ARTERY: Normal in caliber. TRACHEA/BRONCHI: Patent. ESOPHAGUS: No wall thickening or dilatation. HEART: Normal in size. PLEURA: No effusion or pneumothorax. LUNGS: No nodule, mass, or airspace disease. LIVER: Multiple mass lesions are redemonstrated. Subjectively there is overall  increase in tumor burden, however the largest lesion at the dome of the liver in  segment 8 has decreased in size from about 3.0 x 3.4 cm to 2.8 x 2.8 cm (series  2, image 47). A more inferior segment 8 lesion has increased in size from 1.9 x  2.5 cm to 2.3 x 2.8 cm (2, 51) and a segment 7 lesion has increased from 1.1 x  1.6 cm to 1.6 x 2.2 cm (2, 51). A caudate lobe lesion has increased from 1.5 x  1.8 cm to 1.8 x 2.3 cm (2, 51). GALLBLADDER: Collapsed and grossly unremarkable. .  SPLEEN: No mass. PANCREAS: No mass or ductal dilatation. ADRENALS: Unremarkable. KIDNEYS: Unremarkable. STOMACH: Small sliding hiatal hernia. Otherwise unremarkable. SMALL BOWEL: No dilatation or wall thickening. COLON: Status post partial colectomy with right-sided ileocolonic anastomosis  which appears as expected. APPENDIX: Surgically absent. PERITONEUM: Redemonstration of omental caking and intraperitoneal nodularity. Anterior midline peritoneal implant has increased from 1.1 x 1.3 cm to 1.3 x 1.5  cm (2, 96). Nodule in the rectouterine space is increased from approximately 1.2  x 1.6 cm to 1.4 x 1.9 cm (2, 103). Presacral nodules appear grossly stable  measuring 11 x 12 mm and 10 x 12 mm (2, 95 and 96)  RETROPERITONEUM: No lymphadenopathy or aortic aneurysm. REPRODUCTIVE ORGANS: Uterus appears unremarkable. Right ovary appears more  normal measuring 2.4 x 3.7 cm as compared to prior exam measurements of 3.3 x  4.6 cm. Left ovary grossly unremarkable. URINARY BLADDER: No mass or calculus. BONES: Degenerative hip and spine change with no aggressive lesions. ADDITIONAL COMMENTS: Right Port-A-Cath in position with tip of catheter in mid  superior vena cava. Impression IMPRESSION: Overall there is been progression of disease with increase in size  of multiple hepatic metastases and intraperitoneal carcinomatosis with increased  size of peritoneal implants. Previously seen abnormal appearance of the right  ovary is improved and may reflect nonmetastatic process. No new sites of  metastatic disease identified. NOTE: Despite premedication, the patient had an allergic reaction to contrast  administration. I examined the patient in radiology holding with note of diffuse  skin flushing, conjunctiva congestion, and tachycardia with heart rate of 97  bpm. No wheezing or shortness of breath. LABS:  Lab Results   Component Value Date/Time    WBC 7.7 01/11/2017 08:30 AM    Hemoglobin (POC) 13.3 01/11/2017 08:36 AM    HGB 12.2 01/11/2017 08:30 AM    Hematocrit (POC) 39 01/11/2017 08:36 AM    HCT 37.7 01/11/2017 08:30 AM    PLATELET 356 48/65/6367 08:30 AM    MCV 89.3 01/11/2017 08:30 AM         Assessment:      1. Metastatic recurrent colon carcinoma, 3/2016   HOLLIS wild type, Her 2 mutated, MSI stable    2. Local recurrent of colon carcinoma, 01/2016   T4b N1c,Tumor deposits: Present, all 9 LN -ve    3.  Initially diagnosed Colon adenocarcinoma: 05/2013  T4a N0 M0 (Stage II); all 25 LN clear of disease   High risk features - T4a, serosal penetration, involvement of adjacent organs - small bowel and appendix, moderate to poor differentiation     ECOG PS 0  Intent of treatment - palliative    Systemic chemotherapy   FOLFIRI, s/p 3 cycles    Irinotecan reduced 25% d/t neutropenia (5/11/2016)   FOLFIRI + Avastin s/p 8 Cycles     Irinotecan reduced 50% d/t neutropenia (7/6/2016)   5-FU s/p 2 cycles    FOLFIRI + Avastin - s/p 4 Cycles - stopped d/t disease progression    CT Chest, Abd, Pelv (1/23/2017) - disease progression in the liver and intraperitoneal carcinomatosis    Switch therapy to FOLFOX + Panitumumab     Patient was counseled regarding chemotherapy with FOLFOX and Panitumumab. Discussion included side effects, toxicity, benefit and risks of chemotherapy. She understood the expected side effects which may include rash, hypomagnesia, diarrhea, fatigue, low blood counts, among other things. After weighing the benefits and risks, she agreed to proceed with chemotherapy. Symptom management form reviewed with patient. Plan:        > Start FOLFOX + Vectibix in next 1-2 weeks  > Follow-up at start of treatment        Signed by: Willie Zaldivar MD                     January 25, 2017          CC. Kolby Salazar MD   CC. Jey Reese MD   CC.  Adan Mccurdy MD

## 2017-01-25 NOTE — PROGRESS NOTES
Eugenia Pavon is a 70 y.o. female here for follow up had concerns including Follow-up and Colon Cancer. HIPAA verified by two patient identifiers. , no complaints at this time here to discuss change in treatment     Ms. Aime Schaffer has a reminder for a \"due or due soon\" health maintenance. I have asked that she contact her primary care provider for follow-up on this health maintenance.

## 2017-01-25 NOTE — MR AVS SNAPSHOT
Visit Information Date & Time Provider Department Dept. Phone Encounter #  
 1/25/2017  8:30 AM Pee Schwartz MD [address] Oncology at [address] [id number] Upcoming Health Maintenance Date Due Hepatitis C Screening 1945 DTaP/Tdap/Td series (1 - Tdap) 9/17/1966 ZOSTER VACCINE AGE 60> 9/17/2005 GLAUCOMA SCREENING Q2Y 9/17/2010 OSTEOPOROSIS SCREENING (DEXA) 9/17/2010 MEDICARE YEARLY EXAM 9/17/2010 INFLUENZA AGE 9 TO ADULT 8/1/2016 Pneumococcal 65+ High/Highest Risk (2 of 2 - PPSV23) 12/27/2016 BREAST CANCER SCRN MAMMOGRAM 3/9/2017 FOBT Q 1 YEAR AGE 50-75 8/21/2017 Allergies as of 1/25/2017  Review Complete On: 1/25/2017 By: Ever Gilliland LPN Severity Noted Reaction Type Reactions Beef Containing Products High 01/21/2016    Anaphylaxis Heparin (Porcine) High 11/11/2016    Hives Heparin beef starts with hives and can lead to anaphylatic shock Iodinated Contrast Media - Oral And Iv Dye High 11/16/2016    Rash, Nausea and Vomiting, Other (comments) Abdominal pain Pork/porcine Containing Products High 01/21/2016    Anaphylaxis Bactrim [Sulfamethoprim Ds] Medium 05/01/2013   Side Effect Hives Erythromycin Medium 05/01/2013   Side Effect Hives, Itching Ciprofloxacin  05/01/2013   Intolerance Unknown (comments) Splitting headache Macrobid [Nitrofurantoin Monohyd/m-cryst]  05/01/2015    Other (comments) Headaches, bad dreams Current Immunizations  Reviewed on 1/25/2017 Name Date Pneumococcal Conjugate (PCV-13) 11/1/2016 Reviewed by Ever Gilliland LPN on 9/16/1316 at  8:40 AM  
Vitals BP Pulse Temp Resp Height(growth percentile) Weight(growth percentile) 123/76 68 97.5 °F (36.4 °C) 18 5' 5\" (1.651 m) 166 lb (75.3 kg) SpO2 BMI OB Status Smoking Status 98% 27.62 kg/m2 Postmenopausal Former Smoker Vitals History BMI and BSA Data Body Mass Index Body Surface Area  
 27.62 kg/m 2 1.86 m 2 Preferred Pharmacy Pharmacy Name Phone [id number] [address] [phone] Your Updated Medication List  
  
   
This list is accurate as of: 1/25/17  9:13 AM.  Always use your most recent med list.  
  
  
  
  
 acetaminophen 500 mg tablet Commonly known as:  TYLENOL Take  by mouth every six (6) hours as needed for Pain. oxyCODONE IR 5 mg immediate release tablet Commonly known as:  Antoniette Mcgrath Take 1 Tab by mouth every four (4) hours as needed for Pain. Max Daily Amount: 30 mg.  
  
 predniSONE 10 mg tablet Commonly known as:  Erickson Stair Take 5 Tabs by mouth See Admin Instructions. Take 50 mg 13, 7, and 1 hour prior to CT scan To-Do List   
 02/08/2017 8:00 AM  
  Appointment with [facility] [place] [address] at [facility] ([phone])  
  
 02/10/2017 3:00 PM  
  Appointment with CECELIA FAST TRACK/FLOAT NURSE at [facility] ([phone]) [facility] SERVICES! Dear Lourdes Burnette: 
Thank you for requesting a [employer] account. Our records indicate that you already have an active [employer] account. You can access your account anytime at [email or web address] Did you know that you can access your hospital and ER discharge instructions at any time in [employer]? You can also review all of your test results from your hospital stay or ER visit. Additional Information If you have questions, please visit the Frequently Asked Questions section of the [employer] website at [email or web address] Remember, [employer] is NOT to be used for urgent needs. For medical emergencies, dial 911. Now available from your iPhone and Android! Please provide this summary of care documentation to your next provider. Your primary care clinician is listed as Bairon Huynh.  If you have any questions after today's visit, please call [phone].

## 2017-02-08 NOTE — MR AVS SNAPSHOT
Visit Information Date & Time Provider Department Dept. Phone Encounter #  
 2/8/2017  8:45 AM Raymond Londono, NP [address] to [facility] at [address] [phone] Upcoming Health Maintenance Date Due Hepatitis C Screening 1945 DTaP/Tdap/Td series (1 - Tdap) 9/17/1966 ZOSTER VACCINE AGE 60> 9/17/2005 GLAUCOMA SCREENING Q2Y 9/17/2010 OSTEOPOROSIS SCREENING (DEXA) 9/17/2010 MEDICARE YEARLY EXAM 9/17/2010 INFLUENZA AGE 9 TO ADULT 8/1/2016 Pneumococcal 65+ High/Highest Risk (2 of 2 - PPSV23) 12/27/2016 BREAST CANCER SCRN MAMMOGRAM 3/9/2017 FOBT Q 1 YEAR AGE 50-75 8/21/2017 Allergies as of 2/8/2017  Review Complete On: 2/8/2017 By: Santiago Jernigan LPN Severity Noted Reaction Type Reactions Beef Containing Products High 01/21/2016    Anaphylaxis Heparin (Porcine) High 11/11/2016    Hives Heparin beef starts with hives and can lead to anaphylatic shock Iodinated Contrast Media - Oral And Iv Dye High 11/16/2016    Rash, Nausea and Vomiting, Other (comments) Abdominal pain Pork/porcine Containing Products High 01/21/2016    Anaphylaxis Bactrim [Sulfamethoprim Ds] Medium 05/01/2013   Side Effect Hives Erythromycin Medium 05/01/2013   Side Effect Hives, Itching Ciprofloxacin  05/01/2013   Intolerance Unknown (comments) Splitting headache Macrobid [Nitrofurantoin Monohyd/m-cryst]  05/01/2015    Other (comments) Headaches, bad dreams Current Immunizations  Reviewed on 2/8/2017 Name Date Pneumococcal Conjugate (PCV-13) 11/1/2016 Reviewed by Santiago Jernigan LPN on 2/3/7397 at  6:31 AM  
Vitals BP Pulse Temp Resp Height(growth percentile) Weight(growth percentile) 124/83 71 97.8 °F (36.6 °C) 18 5' 4\" (1.626 m) 169 lb (76.7 kg) SpO2 BMI OB Status Smoking Status 98% 29.01 kg/m2 Postmenopausal Former Smoker BMI and BSA Data Body Mass Index Body Surface Area  
 29.01 kg/m 2 1.86 m 2 Preferred Pharmacy Pharmacy Name Phone [id number] [address] [phone] Your Updated Medication List  
  
   
This list is accurate as of: 2/8/17  8:59 AM.  Always use your most recent med list.  
  
  
  
  
 acetaminophen 500 mg tablet Commonly known as:  TYLENOL Take  by mouth every six (6) hours as needed for Pain. oxyCODONE IR 5 mg immediate release tablet Commonly known as:  Danne Copper Take 1 Tab by mouth every four (4) hours as needed for Pain. Max Daily Amount: 30 mg.  
  
 predniSONE 10 mg tablet Commonly known as:  Kolby Gun Take 5 Tabs by mouth See Admin Instructions. Take 50 mg 13, 7, and 1 hour prior to CT scan To-Do List   
 02/10/2017  3:00 PM  
  Appointment with Kemp FAST TRACK/FLOAT NURSE at [facility] ([phone])  
  
 02/22/2017 8:00 AM  
  Appointment with [address] at [facility] ([phone])  
  
 02/24/2017 4:00 PM  
  Appointment with Kemp FAST TRACK/FLOAT NURSE at [facility] ([phone])  
  
 03/08/2017 9:00 AM  
  Appointment with [address] at [facility] ([phone])  
  
 03/10/2017 2:00 PM  
  Appointment with Kemp FAST TRACK/FLOAT NURSE at [facility] ([phone]) Patient Instructions Panitumumab (By injection) Panitumumab (pan-i-TOOM-ue-mab) Treats cancer of the colon or rectum. Brand Name(s):Vectibix There may be other brand names for this medicine. When This Medicine Should Not Be Used: This medicine is not right for everyone. You should not receive it if you had an allergic reaction to panitumumab. How to Use This Medicine:  
Injectable · Your doctor will prescribe your dose and schedule. This medicine is given through a needle placed in a vein.  This medicine needs to be given slowly, so the needle will remain in place for at least an hour. · You will receive this medicine while you are in a hospital or cancer treatment center. A nurse or other trained health professional will give you this medicine. · Missed dose: This medicine needs to be given on a fixed schedule. If you miss a dose, call your doctor, home health caregiver, or treatment clinic for instructions. Drugs and Foods to Avoid: Ask your doctor or pharmacist before using any other medicine, including over-the-counter medicines, vitamins, and herbal products. Warnings While Using This Medicine: · It is not safe to take this medicine during pregnancy. It could harm an unborn baby. Tell your doctor right away if you become pregnant. Men and women should use an effective form of birth control for 6 months after treatment ends. · Tell your doctor if you are breastfeeding, or if you have kidney disease or lung problems. · Medicines used to treat cancer are very strong and can have many side effects. Before receiving this medicine, make sure you understand all the risks and benefits. It is important for you to work closely with your doctor during your treatment. · This medicine may cause the following problems: ¨ Infusion reactions ¨ Lung problems · This medicine can cause serious skin reactions, and sunlight can make this worse. Take precautions while you are receiving this medicine and for 2 months after the last dose. Wear hats and use sunscreen when you are outside. Do not use sunlamps or tanning beds. · Your doctor will do lab tests at regular visits to check on the effects of this medicine. Keep all appointments. Possible Side Effects While Using This Medicine:  
Call your doctor right away if you notice any of these side effects: · Allergic reaction: Itching or hives, swelling in your face or hands, swelling or tingling in your mouth or throat, chest tightness, trouble breathing · Blistering, peeling, red skin rash · Cough, shortness of breath · Decrease in how much or how often you urinate, rapid weight gain, swelling in your hands, ankles, or feet · Dry, itchy, or cracking skin, acne, fingernail changes or swelling around your nails · Dry mouth, increased thirst, muscle cramps, uneven heartbeat · Eye redness or pain, watery eyes, vision changes · Fever, chills, trouble breathing, chest tightness, faintness · Severe diarrhea, nausea, or vomiting · Unusual tiredness or weakness, muscle cramps, confusion If you notice these less serious side effects, talk with your doctor: · Mild diarrhea, nausea, or vomiting If you notice other side effects that you think are caused by this medicine, tell your doctor. Call your doctor for medical advice about side effects. You may report side effects to FDA at 8-984-FDA-2255 © 2016 [address] is for End User's use only and may not be sold, redistributed or otherwise used for commercial purposes. The above information is an  only. It is not intended as medical advice for individual conditions or treatments. Talk to your doctor, nurse or pharmacist before following any medical regimen to see if it is safe and effective for you. Introducing \[facility] [place] [facility]\"" & HEALTH SERVICES! Dear Beronica Abernathy: 
Thank you for requesting a [email or web address] account. Our records indicate that you already have an active [email or web address] account. You can access your account anytime at [email or web address] Did you know that you can access your hospital and ER discharge instructions at any time in [email or web address]? You can also review all of your test results from your hospital stay or ER visit. Additional Information If you have questions, please visit the Frequently Asked Questions section of the [email or web address] website at [email or web address] Remember, [employer] is NOT to be used for urgent needs.  For medical emergencies, dial 911. Now available from your iPhone and Android! Please provide this summary of care documentation to your next provider. Your primary care clinician is listed as Bairon Huynh. If you have any questions after today's visit, please call [phone].

## 2017-02-08 NOTE — PROGRESS NOTES
Karen Fong is a 70 y.o. female here for follow up had concerns including Follow-up and Colon Cancer. HIPAA verified by two patient identifiers. , c/o pain to right side of abdomen #3/10,denies sob . Starting Folfox with Vectibix today. Ms. Rosa Isela Rodriguez has a reminder for a \"due or due soon\" health maintenance. I have asked that she contact her primary care provider for follow-up on this health maintenance.

## 2017-02-08 NOTE — PROGRESS NOTES
Patient: Shireen Braxton MRN: [id number]  SSN: [id number]    YOB: 1945  Age: 70 y.o. Sex: female        Diagnosis:     1. Metastatic recurrent colon carcinoma, 3/2016   HOLLIS wild type, Her 2 mutated, MSI stable    2. Local recurrent of colon carcinoma, 01/2016   T4b N1c,Tumor deposits: Present, all 9 LN -ve    3. Colon adenocarcinoma:    T4a N0 M0 (Stage II); all 25 LN clear of disease       Treatment:      1. Systemic chemotherapy (started 4/13/2016)   FOLFIRI, s/p 3 cycles    Irinotecan reduced 25% d/t neutropenia (5/11/2016)   FOLFIRI + Avastin (started 6/8/2016), s/p 5 cycles    Irinotecan reduced 50% d/t neutropenia    5-FU, s/p 2 cycles (stopped 9/7/2016_              FOLFIRI + Avastin - s/p 4 cycles stopped 1/11/2016              FOLFOX + Panitumumab cycle 1 day 1    2. Laparoscopic assisted resection of abdominal mass with ileocolic and small bowel resection 01/21/2016  3. Adjuvant mFOLFOX    (Between 07/09 - 12/31/2014)  4. Partial colectomy 05/23/2013       Subjective:      Shireen Braxton is a 79 y.o. female with metastatic colon adenocarcinoma. She was initially diagnosed with high risk Stage II disease and underwent a partial colectomy on 05/23/2013. All 25 LN were uninvolved with disease but the tumor penetrated the serosa into the pericolic fat and there was focal invasion in the appendix as well as the small bowel. Ms. Roswell Bumpers completed 6 months of adjuvant mFOLFOX . She had recurrent disease in the abdomen around the previous site of disease. She underwent laparoscopic resection of the small bowel and colon in Jan 2016. The tumor was found to perforate the small bowel and there was some metastatic deposit. Ms. Roswell Bumpers experienced abdominal pain and subsequent CT showed recurrence of disease in the liver, peritoneum, and ovaries. She resumed chemotherapy with FOLFIRI + Avastin. Repeat scans showed disease progression. She is here today to start FOLFOX + Vectibix.  She continues to have right sided abdominal/ flank pain but it is controlled with Tylenol. We discussed potential side effects and management of those side effects. Review of Systems:     Constitutional: negative  Eyes: negative   Ears, Nose, Mouth, Throat, and Face: negative   Respiratory: negative   Cardiovascular: negative   Gastrointestinal: RUQ pain is better  Integument/Breast: negative  Hematologic/Lymphatic: negative   Musculoskeletal:negative   Neurological: negative         Past Medical History   Diagnosis Date    Anemia 5-3-13     Hgb 7.4         received transfuion of two units    Anemia NEC     Arthritis      mild- low back    Cancer (HCC)      colon cancer    GERD (gastroesophageal reflux disease)      at times    Liver disease      spots on liver    Neuropathy     Thyroid disease      nodules     Past Surgical History   Procedure Laterality Date    Hx  section      Hx cervical fusion       anterior cervical disc and fusion    Hx gi  13     COLON RESECTION LAPAROSCOPIC RIGHT     Pr abdomen surgery proc unlisted  16     lap resection of abdominal mass with ileocolic/small bowel resection.  Hx vascular access       Portacath    Colonoscopy N/A 2016     COLONOSCOPY performed by Juan Pablo Phelps MD at [facility]      Family History   Problem Relation Age of Onset   [employer] Cancer Sister      colon    Colon Cancer Sister     Diabetes Mother     Heart Disease Father     Hypertension Father     Stroke Father     Diabetes Father     Colon Cancer Sister     Cancer Sister      lung     Social History   Substance Use Topics    Smoking status: Former Smoker     Packs/day: 1.00     Years: 20.00     Quit date: 1988    Smokeless tobacco: Never Used      Comment: quit 30 yrs ago    Alcohol use 7.0 oz/week     14 Cans of beer per week      Comment: couple beers/day      Prior to Admission medications    Medication Sig Start Date End Date Taking?  Authorizing Provider   predniSONE (DELTASONE) 10 mg tablet Take 5 Tabs by mouth See Admin Instructions. Take 50 mg 13, 7, and 1 hour prior to CT scan 1/11/17  Yes Susan Yanez MD   oxyCODONE IR (ROXICODONE) 5 mg immediate release tablet Take 1 Tab by mouth every four (4) hours as needed for Pain. Max Daily Amount: 30 mg. 11/23/16  Yes Farrel Postal, NP   acetaminophen (TYLENOL) 500 mg tablet Take  by mouth every six (6) hours as needed for Pain. Yes Historical Provider          Allergies   Allergen Reactions    Beef Containing Products Anaphylaxis    Heparin (Porcine) Hives     Heparin beef starts with hives and can lead to anaphylatic shock    Iodinated Contrast Media - Oral And Iv Dye Rash, Nausea and Vomiting and Other (comments)     Abdominal pain    Pork/Porcine Containing Products Anaphylaxis    Bactrim [Sulfamethoprim Ds] Hives    Erythromycin Hives and Itching    Ciprofloxacin Unknown (comments)     Splitting headache    Macrobid [Nitrofurantoin Monohyd/M-Cryst] Other (comments)     Headaches, bad dreams           Objective:     Vitals:    02/08/17 0842   BP: 124/83   Pulse: 71   Resp: 18   Temp: 97.8 °F (36.6 °C)   SpO2: 98%   Weight: 169 lb (76.7 kg)   Height: 5' 4\" (1.626 m)          Physical Exam:    GENERAL: alert, cooperative   EYE: conjunctivae/corneas clear. LYMPHATIC: Cervical, supraclavicular, and axillary nodes normal.   THROAT & NECK: normal and no erythema or exudates noted. LUNG: clear to auscultation bilaterally   HEART: regular rate and rhythm   ABDOMEN: soft, non-tender. EXTREMITIES: extremities normal   SKIN: Normal.   NEUROLOGIC: negative         IMAGING:    CT Results (most recent):    Results from Hospital Encounter encounter on 01/23/17   CT ABD PELV W CONT   Narrative INDICATION: Follow-up metastatic colon cancer. COMPARISON: 11/16/2016.     TECHNIQUE:  Following the uneventful intravenous administration of 96 cc  Isovue-370, 5 mm axial images were obtained through the chest, abdomen, and  pelvis. Oral contrast was not administered. CT dose reduction was achieved  through use of a standardized protocol tailored for this examination and  automatic exposure control for dose modulation. FINDINGS:    THYROID: No nodule. MEDIASTINUM: No change in subcentimeter pericardiophrenic lymph nodes. Otherwise  unremarkable. KENDALL: No mass or lymphadenopathy. THORACIC AORTA: No dissection or aneurysm. MAIN PULMONARY ARTERY: Normal in caliber. TRACHEA/BRONCHI: Patent. ESOPHAGUS: No wall thickening or dilatation. HEART: Normal in size. PLEURA: No effusion or pneumothorax. LUNGS: No nodule, mass, or airspace disease. LIVER: Multiple mass lesions are redemonstrated. Subjectively there is overall  increase in tumor burden, however the largest lesion at the dome of the liver in  segment 8 has decreased in size from about 3.0 x 3.4 cm to 2.8 x 2.8 cm (series  2, image 47). A more inferior segment 8 lesion has increased in size from 1.9 x  2.5 cm to 2.3 x 2.8 cm (2, 51) and a segment 7 lesion has increased from 1.1 x  1.6 cm to 1.6 x 2.2 cm (2, 51). A caudate lobe lesion has increased from 1.5 x  1.8 cm to 1.8 x 2.3 cm (2, 51). GALLBLADDER: Collapsed and grossly unremarkable. .  SPLEEN: No mass. PANCREAS: No mass or ductal dilatation. ADRENALS: Unremarkable. KIDNEYS: Unremarkable. STOMACH: Small sliding hiatal hernia. Otherwise unremarkable. SMALL BOWEL: No dilatation or wall thickening. COLON: Status post partial colectomy with right-sided ileocolonic anastomosis  which appears as expected. APPENDIX: Surgically absent. PERITONEUM: Redemonstration of omental caking and intraperitoneal nodularity. Anterior midline peritoneal implant has increased from 1.1 x 1.3 cm to 1.3 x 1.5  cm (2, 96). Nodule in the rectouterine space is increased from approximately 1.2  x 1.6 cm to 1.4 x 1.9 cm (2, 103).  Presacral nodules appear grossly stable  measuring 11 x 12 mm and 10 x 12 mm (2, 95 and 96)  RETROPERITONEUM: No lymphadenopathy or aortic aneurysm. REPRODUCTIVE ORGANS: Uterus appears unremarkable. Right ovary appears more  normal measuring 2.4 x 3.7 cm as compared to prior exam measurements of 3.3 x  4.6 cm. Left ovary grossly unremarkable. URINARY BLADDER: No mass or calculus. BONES: Degenerative hip and spine change with no aggressive lesions. ADDITIONAL COMMENTS: Right Port-A-Cath in position with tip of catheter in mid  superior vena cava. Impression IMPRESSION: Overall there is been progression of disease with increase in size  of multiple hepatic metastases and intraperitoneal carcinomatosis with increased  size of peritoneal implants. Previously seen abnormal appearance of the right  ovary is improved and may reflect nonmetastatic process. No new sites of  metastatic disease identified. NOTE: Despite premedication, the patient had an allergic reaction to contrast  administration. I examined the patient in radiology holding with note of diffuse  skin flushing, conjunctiva congestion, and tachycardia with heart rate of 97  bpm. No wheezing or shortness of breath. LABS:  Lab Results   Component Value Date/Time    WBC 8.9 02/08/2017 08:32 AM    Hemoglobin (POC) 13.3 01/11/2017 08:36 AM    HGB 12.8 02/08/2017 08:32 AM    Hematocrit (POC) 39 01/11/2017 08:36 AM    HCT 39.3 02/08/2017 08:32 AM    PLATELET 773 94/08/5433 08:32 AM    MCV 90.3 02/08/2017 08:32 AM         Assessment:      1. Metastatic recurrent colon carcinoma, 3/2016   HOLLIS wild type, Her 2 mutated, MSI stable    2. Local recurrent of colon carcinoma, 01/2016   T4b N1c,Tumor deposits: Present, all 9 LN -ve    3.  Initially diagnosed Colon adenocarcinoma: 05/2013  T4a N0 M0 (Stage II); all 25 LN clear of disease   High risk features - T4a, serosal penetration, involvement of adjacent organs - small bowel and appendix, moderate to poor differentiation     ECOG PS 0  Intent of treatment - palliative    Systemic chemotherapy   FOLFIRI, s/p 3 cycles    Irinotecan reduced 25% d/t neutropenia (5/11/2016)   FOLFIRI + Avastin s/p 8 Cycles     Irinotecan reduced 50% d/t neutropenia (7/6/2016)   5-FU s/p 2 cycles    FOLFIRI + Avastin - s/p 4 Cycles - stopped d/t disease progression    CT Chest, Abd, Pelv (1/23/2017) - disease progression in the liver and intraperitoneal carcinomatosis    Start FOLFOX + Panitumumab cycle 1 day 1  Reviewed side effects and management  Labs results reviewed. Symptom management form reviewed with patient. 2. Right sided abdominal pain    > controlled with tylenol      Plan:        > Start FOLFOX + Vectibix today  > Encourage use of hand and foot creams  > Follow-up in 2 weeks        Signed by: Vee Elias MD                     February 8, 2017          CC. Bea Howe MD   CC. Narcisa Elmore MD   CC.  Kellie Quiroz MD

## 2017-02-08 NOTE — PROGRESS NOTES
0815 Pt arrived at [facility] ambulatory and in no distress for C1D1. Assessment completed, no new complaints voiced. Port accessed per protocol with positive blood return. Visit Vitals    /78    Pulse 75    Resp 18    Ht 5' 4.96\" (1.65 m)    Wt 76.4 kg (168 lb 8 oz)    BMI 28.07 kg/m2       Medications received:  D5% KVO  Aloxi 0.25 mg IV  Decadron 12 mg IV  Oxaliplatin 160 mg IV  Leucovorin 750 mg IV  5FU 750 mg IV  NS @ KVO  Vectibix 460 mg IV  5FU 4510 mg IV via pump over 46 hours      1515 Pt monitored 30 minutes post primary infusion of Vectibix. Tolerated treatment well, no adverse reaction noted. Port intact with pump infusing. D/Cd from [facility] ambulatory and in no distress accompanied by family.   Next appt 2/10

## 2017-02-10 NOTE — PROGRESS NOTES
[facility] Short Visit Note:    1500 Pt arrived at [facility] ambulatory and in no distress for pump removal.  Pt feeling more tired than normal, had nausea (but meds helped), had a fever last night, and reports pain in lower back radiating to right side- 9/10 with movement, walking, or deep breathing and 3-4/10 at rest.  Notified MD office to report symptoms. Orders received for UA with reflex culture. If  fever returns or symptoms worsen, pt is to call MD.       Visit Vitals    /78 (BP 1 Location: Left arm, BP Patient Position: Sitting)    Pulse 90    Temp 97.4 °F (36.3 °C)    Resp 18    SpO2 97%     Recent Results (from the past 12 hour(s))   URINALYSIS W/ REFLEX CULTURE    Collection Time: 02/10/17  3:29 PM   Result Value Ref Range    Color YELLOW/STRAW      Appearance CLEAR CLEAR      Specific gravity 1.028 1.003 - 1.030      pH (UA) 5.5 5.0 - 8.0      Protein NEGATIVE  NEG mg/dL    Glucose NEGATIVE  NEG mg/dL    Ketone NEGATIVE  NEG mg/dL    Blood NEGATIVE  NEG      Urobilinogen 1.0 0.2 - 1.0 EU/dL    Nitrites NEGATIVE  NEG      Leukocyte Esterase NEGATIVE  NEG      WBC 5-10 0 - 4 /hpf    RBC 0-5 0 - 5 /hpf    Epithelial cells FEW FEW /lpf    Bacteria 1+ (A) NEG /hpf    UA:UC IF INDICATED URINE CULTURE ORDERED (A) CNI     BILIRUBIN, CONFIRM    Collection Time: 02/10/17  3:29 PM   Result Value Ref Range    Bilirubin UA, confirm NEGATIVE  NEG         All chemo contents infused. Port flushed with saline and sodium citrate flush, needle removed, 2x2 and paper tape placed. [facility] well, no adverse reaction noted. D/Cd from [facility] ambulatory and in no distress accompanied by son. Next appt 2/22/17 @ 0800.

## 2017-02-10 NOTE — PROGRESS NOTES
Pt reports pain in back unrelieved by tylenol, Pt reports temp last night but not now. U/A with reflex ordered.

## 2017-02-16 NOTE — PROGRESS NOTES
Pt called reporting symptoms from taking first cycle of Vectibix. Pt reports over the weekend she had some nausea, no vomiting, some tingling in hand and some back pain. Pt reports these symptoms are much improved now, no more pain and eating today. Pt encouraged to call the office if any changes or questions.

## 2017-02-22 NOTE — PROGRESS NOTES
Catherine Morales is a 70 y.o. female here for follow up had concerns including Follow-up and Colon Cancer. HIPAA verified by two patient identifiers. ,c/o mild fatigue,mild pain to right side #2/10,skin rash,mild numbing and tingling in fingers    Ms. Teresa Campos has a reminder for a \"due or due soon\" health maintenance. I have asked that she contact her primary care provider for follow-up on this health maintenance.

## 2017-02-22 NOTE — PROGRESS NOTES
Patient: Eugenia Pavon MRN: [id number]  SSN: [id number]    YOB: 1945  Age: 70 y.o. Sex: female        Diagnosis:     1. Metastatic recurrent colon carcinoma, 3/2016   HOLLIS wild type, Her 2 mutated, MSI stable    2. Local recurrent of colon carcinoma, 01/2016   T4b N1c,Tumor deposits: Present, all 9 LN -ve    3. Colon adenocarcinoma:    T4a N0 M0 (Stage II); all 25 LN clear of disease       Treatment:      1. Systemic chemotherapy (started 4/13/2016)   FOLFIRI, s/p 3 cycles    Irinotecan reduced 25% d/t neutropenia (5/11/2016)   FOLFIRI + Avastin (started 6/8/2016), s/p 5 cycles    Irinotecan reduced 50% d/t neutropenia    5-FU, s/p 2 cycles (stopped 9/7/2016_              FOLFIRI + Avastin - s/p 4 cycles stopped 1/11/2016              FOLFOX + Panitumumab cycle 2  day 1    2. Laparoscopic assisted resection of abdominal mass with ileocolic and small bowel resection 01/21/2016  3. Adjuvant mFOLFOX    (Between 07/09 - 12/31/2014)  4. Partial colectomy 05/23/2013       Subjective:      Eugenia Pavon is a 79 y.o. female with metastatic colon adenocarcinoma. She was initially diagnosed with high risk Stage II disease and underwent a partial colectomy on 05/23/2013. All 25 LN were uninvolved with disease but the tumor penetrated the serosa into the pericolic fat and there was focal invasion in the appendix as well as the small bowel. Ms. Aime Schaffer completed 6 months of adjuvant mFOLFOX . She had recurrent disease in the abdomen around the previous site of disease. She underwent laparoscopic resection of the small bowel and colon in Jan 2016. The tumor was found to perforate the small bowel and there was some metastatic deposit. Ms. Aime Schaffer experienced abdominal pain and subsequent CT showed recurrence of disease in the liver, peritoneum, and ovaries. She resumed chemotherapy with FOLFIRI + Avastin. Repeat scans showed disease progression. She is  receiving FOLFOX + Vectibix cycle 2      Ms. Aime Schaffer said the flank pain is less severe and infrequent. She did develop a rash around her nose which has improved. She also feels she had a stomach bug last week because she had a fever with nausea and vomiting. This improved last weekend. Review of Systems:     Constitutional: negative  Eyes: negative   Ears, Nose, Mouth, Throat, and Face: negative   Respiratory: negative   Cardiovascular: negative   Gastrointestinal: RUQ pain is better  Integument/Breast: negative  Hematologic/Lymphatic: negative   Musculoskeletal:negative   Neurological: negative         Past Medical History:   Diagnosis Date    Anemia 5-3-13    Hgb 7.4         received transfuion of two units    Anemia NEC     Arthritis     mild- low back    Cancer (HCC)     colon cancer    GERD (gastroesophageal reflux disease)     at times    Liver disease     spots on liver    Neuropathy     Thyroid disease     nodules     Past Surgical History:   Procedure Laterality Date    ABDOMEN SURGERY PROC UNLISTED  16    lap resection of abdominal mass with ileocolic/small bowel resection.     COLONOSCOPY N/A 2016    COLONOSCOPY performed by Narciso L[facility] at [facility] ENDOSCOPY    HX CERVICAL FUSION      anterior cervical disc and fusion    HX  SECTION      HX GI  13    COLON RESECTION LAPAROSCOPIC RIGHT     HX VASCULAR ACCESS      Portacath      Family History   Problem Relation Age of Onset    Cancer Sister      colon    Colon Cancer Sister     Diabetes Mother     Heart Disease Father     Hypertension Father     Stroke Father     Diabetes Father     Colon Cancer Sister     Cancer Sister      lung     Social History   Substance Use Topics    Smoking status: Former Smoker     Packs/day: 1.00     Years: 20.00     Quit date: 1988    Smokeless tobacco: Never Used      Comment: quit 30 yrs ago    Alcohol use 7.0 oz/week     14 Cans of beer per week      Comment: couple beers/day      Prior to Admission medications    Medication Sig Start Date End Date Taking? Authorizing Provider   predniSONE (DELTASONE) 10 mg tablet Take 5 Tabs by mouth See Admin Instructions. Take 50 mg 13, 7, and 1 hour prior to CT scan 1/11/17  Yes Prashant Altamirano MD   oxyCODONE IR (ROXICODONE) 5 mg immediate release tablet Take 1 Tab by mouth every four (4) hours as needed for Pain. Max Daily Amount: 30 mg. 11/23/16  Yes Anne Marie Rice NP   acetaminophen (TYLENOL) 500 mg tablet Take  by mouth every six (6) hours as needed for Pain. Yes Historical Provider          Allergies   Allergen Reactions    Beef Containing Products Anaphylaxis    Heparin (Porcine) Hives     Heparin beef starts with hives and can lead to anaphylatic shock    Iodinated Contrast Media - Oral And Iv Dye Rash, Nausea and Vomiting and Other (comments)     Abdominal pain    Pork/Porcine Containing Products Anaphylaxis    Bactrim [Sulfamethoprim Ds] Hives    Erythromycin Hives and Itching    Ciprofloxacin Unknown (comments)     Splitting headache    Macrobid [Nitrofurantoin Monohyd/M-Cryst] Other (comments)     Headaches, bad dreams           Objective:     Vitals:    02/22/17 0852   BP: 116/81   Pulse: 72   Resp: 18   Temp: 98.2 °F (36.8 °C)   SpO2: 97%   Weight: 165 lb (74.8 kg)   Height: 5' 5\" (1.651 m)          Physical Exam:    GENERAL: alert, cooperative   EYE: conjunctivae/corneas clear. LYMPHATIC: Cervical, supraclavicular, and axillary nodes normal.   THROAT & NECK: normal and no erythema or exudates noted. LUNG: clear to auscultation bilaterally   HEART: regular rate and rhythm   ABDOMEN: soft, non-tender. EXTREMITIES: extremities normal   SKIN: Normal.   NEUROLOGIC: negative         IMAGING:    CT Results (most recent):    Results from Hospital Encounter encounter on 01/23/17   CT ABD PELV W CONT   Narrative INDICATION: Follow-up metastatic colon cancer. COMPARISON: 11/16/2016.     TECHNIQUE:  Following the uneventful intravenous administration of 96 cc  Isovue-370, 5 mm axial images were obtained through the chest, abdomen, and  pelvis. Oral contrast was not administered. CT dose reduction was achieved  through use of a standardized protocol tailored for this examination and  automatic exposure control for dose modulation. FINDINGS:    THYROID: No nodule. MEDIASTINUM: No change in subcentimeter pericardiophrenic lymph nodes. Otherwise  unremarkable. KENDALL: No mass or lymphadenopathy. THORACIC AORTA: No dissection or aneurysm. MAIN PULMONARY ARTERY: Normal in caliber. TRACHEA/BRONCHI: Patent. ESOPHAGUS: No wall thickening or dilatation. HEART: Normal in size. PLEURA: No effusion or pneumothorax. LUNGS: No nodule, mass, or airspace disease. LIVER: Multiple mass lesions are redemonstrated. Subjectively there is overall  increase in tumor burden, however the largest lesion at the dome of the liver in  segment 8 has decreased in size from about 3.0 x 3.4 cm to 2.8 x 2.8 cm (series  2, image 47). A more inferior segment 8 lesion has increased in size from 1.9 x  2.5 cm to 2.3 x 2.8 cm (2, 51) and a segment 7 lesion has increased from 1.1 x  1.6 cm to 1.6 x 2.2 cm (2, 51). A caudate lobe lesion has increased from 1.5 x  1.8 cm to 1.8 x 2.3 cm (2, 51). GALLBLADDER: Collapsed and grossly unremarkable. .  SPLEEN: No mass. PANCREAS: No mass or ductal dilatation. ADRENALS: Unremarkable. KIDNEYS: Unremarkable. STOMACH: Small sliding hiatal hernia. Otherwise unremarkable. SMALL BOWEL: No dilatation or wall thickening. COLON: Status post partial colectomy with right-sided ileocolonic anastomosis  which appears as expected. APPENDIX: Surgically absent. PERITONEUM: Redemonstration of omental caking and intraperitoneal nodularity. Anterior midline peritoneal implant has increased from 1.1 x 1.3 cm to 1.3 x 1.5  cm (2, 96). Nodule in the rectouterine space is increased from approximately 1.2  x 1.6 cm to 1.4 x 1.9 cm (2, 103).  Presacral nodules appear grossly stable  measuring 11 x 12 mm and 10 x 12 mm (2, 95 and 96)  RETROPERITONEUM: No lymphadenopathy or aortic aneurysm. REPRODUCTIVE ORGANS: Uterus appears unremarkable. Right ovary appears more  normal measuring 2.4 x 3.7 cm as compared to prior exam measurements of 3.3 x  4.6 cm. Left ovary grossly unremarkable. URINARY BLADDER: No mass or calculus. BONES: Degenerative hip and spine change with no aggressive lesions. ADDITIONAL COMMENTS: Right Port-A-Cath in position with tip of catheter in mid  superior vena cava. Impression IMPRESSION: Overall there is been progression of disease with increase in size  of multiple hepatic metastases and intraperitoneal carcinomatosis with increased  size of peritoneal implants. Previously seen abnormal appearance of the right  ovary is improved and may reflect nonmetastatic process. No new sites of  metastatic disease identified. NOTE: Despite premedication, the patient had an allergic reaction to contrast  administration. I examined the patient in radiology holding with note of diffuse  skin flushing, conjunctiva congestion, and tachycardia with heart rate of 97  bpm. No wheezing or shortness of breath. LABS:  Lab Results   Component Value Date/Time    WBC 3.9 02/22/2017 08:33 AM    Hemoglobin (POC) 13.3 01/11/2017 08:36 AM    HGB 11.9 02/22/2017 08:33 AM    Hematocrit (POC) 39 01/11/2017 08:36 AM    HCT 35.7 02/22/2017 08:33 AM    PLATELET 940 81/03/1818 08:33 AM    MCV 89.0 02/22/2017 08:33 AM         Assessment:      1. Metastatic recurrent colon carcinoma, 3/2016   HOLLIS wild type, Her 2 mutated, MSI stable    2. Local recurrent of colon carcinoma, 01/2016   T4b N1c,Tumor deposits: Present, all 9 LN -ve    3.  Initially diagnosed Colon adenocarcinoma: 05/2013  T4a N0 M0 (Stage II); all 25 LN clear of disease   High risk features - T4a, serosal penetration, involvement of adjacent organs - small bowel and appendix, moderate to poor differentiation ECOG PS 0  Intent of treatment - palliative    Systemic chemotherapy   FOLFIRI, s/p 3 cycles    Irinotecan reduced 25% d/t neutropenia (5/11/2016)   FOLFIRI + Avastin s/p 8 Cycles     Irinotecan reduced 50% d/t neutropenia (7/6/2016)   5-FU s/p 2 cycles    FOLFIRI + Avastin - s/p 4 Cycles - stopped d/t disease progression    CT Chest, Abd, Pelv (1/23/2017) - disease progression in the liver and intraperitoneal carcinomatosis    Start FOLFOX + Panitumumab cycle 2 day 1  Tolerating treatment very well    Had some diarrhea and nausea for one day - could have been related to viral gastroenteritis. Resolved in 1-2 days  Cold sensitivity in hands and excess sensitivity from salty food - ? Oxaliplatin related. ? Rash - patient reported but not seen  A detailed system by system evaluation of side effect was performed to assess chemotherapy related toxicity. Blood counts are acceptable. Results reviewed with the patient  Symptom management form reviewed with patient. 2. Right sided abdominal pain - improved    > controlled with tylenol      Plan:        > Contnue FOLFOX + Vectibix cycle #2  > Encourage use of hand and foot creams  > Will repeat a MRI of the Abdomen after 4 treatments  > Follow-up in 2 weeks        Signed by: Jose Tran MD                     February 22, 2017          CC. Raudel Bella MD   CC. Himanshu Wade MD   CC.  Yevgeniy Martines MD

## 2017-02-22 NOTE — PROGRESS NOTES
Pt arrived to [facility] ambulatory in no acute distress at 0810 for Folfox + Vectibix C2.  Assessment unremarkable except pt reports she thinks she had a stomach bug after her last treatment and experienced some diarrhea, pt also has some right sided abdomen pain, and a rash of blisters around her nose, which she has been putting hydrocortisone cream on. R chest port accessed without issue and positive blood return noted- biopatch placed.  Labs obtained- CBCap, BMP, & Mag. Pt over to MD office for follow up. Spoke with Dwain Alford NP about pt's low calcium level- no new orders received at this time except draw a CMP next visit so we can calculate corrected calcium (note put in pt's chart). Visit Vitals    /67 (BP 1 Location: Left arm, BP Patient Position: Sitting)    Pulse 66    Temp 97 °F (36.1 °C)    Resp 18    Ht 5' 5\" (1.651 m)    Wt 75.2 kg (165 lb 11.2 oz)    SpO2 96%    BMI 27.57 kg/m2       Recent Results (from the past 12 hour(s))   CBC WITH 3 PART DIFF    Collection Time: 02/22/17  8:33 AM   Result Value Ref Range    WBC 3.9 3.6 - 11.0 K/uL    RBC 4.01 3.80 - 5.20 M/uL    HGB 11.9 11.5 - 16.0 g/dL    HCT 35.7 35.0 - 47.0 %    MCV 89.0 80.0 - 99.0 FL    MCH 29.7 26.0 - 34.0 PG    MCHC 33.3 30.0 - 36.5 g/dL    RDW 18.8 (H) 11.8 - 15.8 %    PLATELET 201 225 - 344 K/uL    NEUTROPHILS 54 32 - 75 %    MIXED CELLS 13 3.2 - 16.9 %    LYMPHOCYTES 33 12 - 49 %    ABS. NEUTROPHILS 2.1 1.8 - 8.0 K/UL    ABS. MIXED CELLS 0.5 0.2 - 1.2 K/uL    ABS.  LYMPHOCYTES 1.3 0.8 - 3.5 K/UL    DF AUTOMATED     METABOLIC PANEL, BASIC    Collection Time: 02/22/17  8:33 AM   Result Value Ref Range    Sodium 145 136 - 145 mmol/L    Potassium 3.6 3.5 - 5.1 mmol/L    Chloride 107 97 - 108 mmol/L    CO2 28 21 - 32 mmol/L    Anion gap 10 5 - 15 mmol/L    Glucose 98 65 - 100 mg/dL    BUN 4 (L) 6 - 20 MG/DL    Creatinine 0.48 (L) 0.55 - 1.02 MG/DL    BUN/Creatinine ratio 8 (L) 12 - 20      GFR est AA >60 >60 ml/min/1.73m2 GFR est non-AA >60 >60 ml/min/1.73m2    Calcium 8.1 (L) 8.5 - 10.1 MG/DL   MAGNESIUM    Collection Time: 02/22/17  8:33 AM   Result Value Ref Range    Magnesium 2.3 1.6 - 2.4 mg/dL       The following medications administered:  D5 @ KVO  Aloxi 0.25 mg IVP  Decadron 12 mg IVP  Leucovorin 750 mg IV  Oxaliplatin 160 mg IV  NS @ KVO  5- mg IVP  Vectibix 460 mg IV  5-FU 4,510 mg IV via chemo pump set to run over 46 hrs    Visit Vitals    /73    Pulse (!) 101    Temp 97 °F (36.1 °C)    Resp 18    Ht 5' 5\" (1.651 m)    Wt 75.2 kg (165 lb 11.2 oz)    SpO2 96%    BMI 27.57 kg/m2       Pt tolerated treatment well. Port left intact with chemo pump infusing. Pt discharged ambulatory in no acute distress at 1420, accompanied by family.   Next appointment 2/22/17 at 4 PM for pump removal.

## 2017-03-08 PROBLEM — L03.211 CELLULITIS OF FACE: Status: ACTIVE | Noted: 2017-01-01

## 2017-03-08 NOTE — PROGRESS NOTES
Patient: Rudy Garcia MRN: [id number]  SSN: [id number]    YOB: 1945  Age: 70 y.o. Sex: female        Diagnosis:     1. Metastatic recurrent colon carcinoma, 3/2016   HOLLIS wild type, Her 2 mutated, MSI stable    2. Local recurrent of colon carcinoma, 01/2016   T4b N1c,Tumor deposits: Present, all 9 LN -ve    3. Colon adenocarcinoma:    T4a N0 M0 (Stage II); all 25 LN clear of disease       Treatment:      1. Systemic chemotherapy (started 4/13/2016)   FOLFIRI, s/p 3 cycles    Irinotecan reduced 25% d/t neutropenia (5/11/2016)   FOLFIRI + Avastin (started 6/8/2016), s/p 5 cycles    Irinotecan reduced 50% d/t neutropenia    5-FU, s/p 2 cycles (stopped 9/7/2016_              FOLFIRI + Avastin - s/p 4 cycles stopped 1/11/2016              FOLFOX + Panitumumab s/p 2 cycles     2. Laparoscopic assisted resection of abdominal mass with ileocolic and small bowel resection 01/21/2016  3. Adjuvant mFOLFOX    (Between 07/09 - 12/31/2014)  4. Partial colectomy 05/23/2013       Subjective:      Rudy Garcia is a 79 y.o. female with metastatic colon adenocarcinoma. She was initially diagnosed with high risk Stage II disease and underwent a partial colectomy on 05/23/2013. All 25 LN were uninvolved with disease but the tumor penetrated the serosa into the pericolic fat and there was focal invasion in the appendix as well as the small bowel. Ms. Raimundo Yen completed 6 months of adjuvant mFOLFOX . She had recurrent disease in the abdomen around the previous site of disease. She underwent laparoscopic resection of the small bowel and colon in Jan 2016. The tumor was found to perforate the small bowel and there was some metastatic deposit. Ms. Raimundo Yen experienced abdominal pain and subsequent CT showed recurrence of disease in the liver, peritoneum, and ovaries. She resumed chemotherapy with FOLFIRI + Avastin. Repeat scans showed disease progression. She is receiving FOLFOX + Vectibix.  Abdominal pain has resolved. She has developed a rash around her nose and cheeks. Her appetite is good. Review of Systems:     Constitutional: fatigue  Eyes: negative   Ears, Nose, Mouth, Throat, and Face: negative   Respiratory: negative   Cardiovascular: negative   Gastrointestinal: RUQ pain is better  Integument/Breast: facial rash  Hematologic/Lymphatic: negative   Musculoskeletal:negative   Neurological: negative         Past Medical History:   Diagnosis Date    Anemia 5-3-13    Hgb 7.4         received transfuion of two units    Anemia NEC     Arthritis     mild- low back    Cancer (HCC)     colon cancer    GERD (gastroesophageal reflux disease)     at times    Liver disease     spots on liver    Neuropathy     Thyroid disease     nodules     Past Surgical History:   Procedure Laterality Date    ABDOMEN SURGERY PROC UNLISTED  16    lap resection of abdominal mass with ileocolic/small bowel resection.  COLONOSCOPY N/A 2016    COLONOSCOPY performed by Silvia [facility]man [facility] ENDOSCOPY    HX CERVICAL FUSION      anterior cervical disc and fusion    HX  SECTION      HX GI  13    COLON RESECTION LAPAROSCOPIC RIGHT     HX VASCULAR ACCESS      Portacath      Family History   Problem Relation Age of Onset    Cancer Sister      colon    Colon Cancer Sister     Diabetes Mother     Heart Disease Father     Hypertension Father     Stroke Father     Diabetes Father     Colon Cancer Sister     Cancer Sister      lung     Social History   Substance Use Topics    Smoking status: Former Smoker     Packs/day: 1.00     Years: 20.00     Quit date: 1988    Smokeless tobacco: Never Used      Comment: quit 30 yrs ago    Alcohol use 7.0 oz/week     14 Cans of beer per week      Comment: couple beers/day      Prior to Admission medications    Medication Sig Start Date End Date Taking?  Authorizing Provider   doxycycline (ADOXA) 100 mg tablet Take 1 Tab by mouth two (2) times a day for 14 days. 3/8/17 3/22/17 Yes Nicole Arboleda NP   acetaminophen (TYLENOL) 500 mg tablet Take  by mouth every six (6) hours as needed for Pain. Yes Historical Provider   predniSONE (DELTASONE) 10 mg tablet Take 5 Tabs by mouth See Admin Instructions. Take 50 mg 13, 7, and 1 hour prior to CT scan 1/11/17   Pelon Marmolejo MD   oxyCODONE IR (ROXICODONE) 5 mg immediate release tablet Take 1 Tab by mouth every four (4) hours as needed for Pain. Max Daily Amount: 30 mg. 11/23/16   Nicole Arboleda NP          Allergies   Allergen Reactions    Beef Containing Products Anaphylaxis    Heparin (Porcine) Hives     Heparin beef starts with hives and can lead to anaphylatic shock    Iodinated Contrast Media - Oral And Iv Dye Rash, Nausea and Vomiting and Other (comments)     Abdominal pain    Pork/Porcine Containing Products Anaphylaxis    Bactrim [Sulfamethoprim Ds] Hives    Erythromycin Hives and Itching    Ciprofloxacin Unknown (comments)     Splitting headache    Macrobid [Nitrofurantoin Monohyd/M-Cryst] Other (comments)     Headaches, bad dreams           Objective:     Vitals:    03/08/17 0848   BP: 106/70   Pulse: 90   Temp: 98.1 °F (36.7 °C)   TempSrc: Oral   SpO2: 94%   Weight: 163 lb 6.4 oz (74.1 kg)   Height: 5' 5\" (1.651 m)          Physical Exam:    GENERAL: alert, cooperative   EYE: conjunctivae/corneas clear. LYMPHATIC: Cervical, supraclavicular, and axillary nodes normal.   THROAT & NECK: normal and no erythema or exudates noted. LUNG: clear to auscultation bilaterally   HEART: regular rate and rhythm   ABDOMEN: soft, non-tender. EXTREMITIES: extremities normal   SKIN: Normal.   NEUROLOGIC: negative         IMAGING:    CT Results (most recent):    Results from Hospital Encounter encounter on 01/23/17   CT ABD PELV W CONT   Narrative INDICATION: Follow-up metastatic colon cancer. COMPARISON: 11/16/2016.     TECHNIQUE:  Following the uneventful intravenous administration of 96 cc  Isovue-370, 5 mm axial images were obtained through the chest, abdomen, and  pelvis. Oral contrast was not administered. CT dose reduction was achieved  through use of a standardized protocol tailored for this examination and  automatic exposure control for dose modulation. FINDINGS:    THYROID: No nodule. MEDIASTINUM: No change in subcentimeter pericardiophrenic lymph nodes. Otherwise  unremarkable. KENDALL: No mass or lymphadenopathy. THORACIC AORTA: No dissection or aneurysm. MAIN PULMONARY ARTERY: Normal in caliber. TRACHEA/BRONCHI: Patent. ESOPHAGUS: No wall thickening or dilatation. HEART: Normal in size. PLEURA: No effusion or pneumothorax. LUNGS: No nodule, mass, or airspace disease. LIVER: Multiple mass lesions are redemonstrated. Subjectively there is overall  increase in tumor burden, however the largest lesion at the dome of the liver in  segment 8 has decreased in size from about 3.0 x 3.4 cm to 2.8 x 2.8 cm (series  2, image 47). A more inferior segment 8 lesion has increased in size from 1.9 x  2.5 cm to 2.3 x 2.8 cm (2, 51) and a segment 7 lesion has increased from 1.1 x  1.6 cm to 1.6 x 2.2 cm (2, 51). A caudate lobe lesion has increased from 1.5 x  1.8 cm to 1.8 x 2.3 cm (2, 51). GALLBLADDER: Collapsed and grossly unremarkable. .  SPLEEN: No mass. PANCREAS: No mass or ductal dilatation. ADRENALS: Unremarkable. KIDNEYS: Unremarkable. STOMACH: Small sliding hiatal hernia. Otherwise unremarkable. SMALL BOWEL: No dilatation or wall thickening. COLON: Status post partial colectomy with right-sided ileocolonic anastomosis  which appears as expected. APPENDIX: Surgically absent. PERITONEUM: Redemonstration of omental caking and intraperitoneal nodularity. Anterior midline peritoneal implant has increased from 1.1 x 1.3 cm to 1.3 x 1.5  cm (2, 96). Nodule in the rectouterine space is increased from approximately 1.2  x 1.6 cm to 1.4 x 1.9 cm (2, 103).  Presacral nodules appear grossly stable  measuring 11 x 12 mm and 10 x 12 mm (2, 95 and 96)  RETROPERITONEUM: No lymphadenopathy or aortic aneurysm. REPRODUCTIVE ORGANS: Uterus appears unremarkable. Right ovary appears more  normal measuring 2.4 x 3.7 cm as compared to prior exam measurements of 3.3 x  4.6 cm. Left ovary grossly unremarkable. URINARY BLADDER: No mass or calculus. BONES: Degenerative hip and spine change with no aggressive lesions. ADDITIONAL COMMENTS: Right Port-A-Cath in position with tip of catheter in mid  superior vena cava. Impression IMPRESSION: Overall there is been progression of disease with increase in size  of multiple hepatic metastases and intraperitoneal carcinomatosis with increased  size of peritoneal implants. Previously seen abnormal appearance of the right  ovary is improved and may reflect nonmetastatic process. No new sites of  metastatic disease identified. NOTE: Despite premedication, the patient had an allergic reaction to contrast  administration. I examined the patient in radiology holding with note of diffuse  skin flushing, conjunctiva congestion, and tachycardia with heart rate of 97  bpm. No wheezing or shortness of breath. LABS:  Lab Results   Component Value Date/Time    WBC 3.9 02/22/2017 08:33 AM    Hemoglobin (POC) 13.3 01/11/2017 08:36 AM    HGB 11.9 02/22/2017 08:33 AM    Hematocrit (POC) 39 01/11/2017 08:36 AM    HCT 35.7 02/22/2017 08:33 AM    PLATELET 742 16/49/4893 08:33 AM    MCV 89.0 02/22/2017 08:33 AM         Assessment:      1. Metastatic recurrent colon carcinoma, 3/2016   HOLLIS wild type, Her 2 mutated, MSI stable    2. Local recurrent of colon carcinoma, 01/2016   T4b N1c,Tumor deposits: Present, all 9 LN -ve    3.  Initially diagnosed Colon adenocarcinoma: 05/2013  T4a N0 M0 (Stage II); all 25 LN clear of disease   High risk features - T4a, serosal penetration, involvement of adjacent organs - small bowel and appendix, moderate to poor differentiation     ECOG PS 0  Intent of treatment - palliative    Systemic chemotherapy   FOLFIRI, s/p 3 cycles    Irinotecan reduced 25% d/t neutropenia (5/11/2016)   FOLFIRI + Avastin s/p 8 Cycles     Irinotecan reduced 50% d/t neutropenia (7/6/2016)   5-FU s/p 2 cycles    FOLFIRI + Avastin - s/p 4 Cycles - stopped d/t disease progression    CT Chest, Abd, Pelv (1/23/2017) - disease progression in the liver and intraperitoneal carcinomatosis    Receiving FOLFOX + Panitumumab s/p 2 cycles  Tolerating treatment very well    Cold sensitivity in hands - Oxaliplatin related. ? Rash on the face - Vectibix related. Using lotions and add Doxycycline. A detailed system by system evaluation of side effect was performed to assess chemotherapy related toxicity. Blood counts are acceptable. Results reviewed with the patient  Symptom management form reviewed with patient. 2. Right sided abdominal pain - resolved      3. Facial Rash    From Vectibix    > Using facial lotion  > Doxycline 100 bid x 14 days      4. Neutropenia    > Hold treatment today  > get approval for neulasta on Day 3 for next week      Plan:        > Hold today (neutropenia)  > Get approval for neulasta  > Encourage use of hand and foot creams  > Will repeat a MRI of the Abdomen after 4 treatments  > Doxycycline for facial rash  > Follow-up in 2 weeks        Signed by: Rosalino Morales MD                     March 8, 2017          CC. Danya Crenshaw MD   CC. Krysta Ross MD   CC.  Tracy Jarrett MD

## 2017-03-08 NOTE — PROGRESS NOTES
Pt arrived to [facility] ambulatory for FOLFOX panitumumab C3 in no acute distress at 0920.  Assessment unremarkable except red hive-like rash to face and congestion. R chest port accessed without issue and positive blood return noted.  Labs obtained- CBCap (failed, CBC with diff sent to lab), CMP, and Mag. Visit Vitals    /72 (BP 1 Location: Left arm, BP Patient Position: Sitting)    Pulse 84    Temp 97.6 °F (36.4 °C)    Resp 18    Ht 5' 5\" (1.651 m)    Wt 74.2 kg (163 lb 9.6 oz)    SpO2 99%    Breastfeeding No    BMI 27.22 kg/m2     Recent Results (from the past 12 hour(s))   METABOLIC PANEL, COMPREHENSIVE    Collection Time: 03/08/17  9:30 AM   Result Value Ref Range    Sodium 145 136 - 145 mmol/L    Potassium 3.4 (L) 3.5 - 5.1 mmol/L    Chloride 109 (H) 97 - 108 mmol/L    CO2 30 21 - 32 mmol/L    Anion gap 6 5 - 15 mmol/L    Glucose 90 65 - 100 mg/dL    BUN 4 (L) 6 - 20 MG/DL    Creatinine 0.57 0.55 - 1.02 MG/DL    BUN/Creatinine ratio 7 (L) 12 - 20      GFR est AA >60 >60 ml/min/1.73m2    GFR est non-AA >60 >60 ml/min/1.73m2    Calcium 8.1 (L) 8.5 - 10.1 MG/DL    Bilirubin, total 0.5 0.2 - 1.0 MG/DL    ALT (SGPT) 24 12 - 78 U/L    AST (SGOT) 24 15 - 37 U/L    Alk.  phosphatase 152 (H) 45 - 117 U/L    Protein, total 6.7 6.4 - 8.2 g/dL    Albumin 3.2 (L) 3.5 - 5.0 g/dL    Globulin 3.5 2.0 - 4.0 g/dL    A-G Ratio 0.9 (L) 1.1 - 2.2     MAGNESIUM    Collection Time: 03/08/17  9:30 AM   Result Value Ref Range    Magnesium 2.0 1.6 - 2.4 mg/dL   CBC WITH AUTOMATED DIFF    Collection Time: 03/08/17  9:30 AM   Result Value Ref Range    WBC 2.5 (L) 3.6 - 11.0 K/uL    RBC 4.31 3.80 - 5.20 M/uL    HGB 12.5 11.5 - 16.0 g/dL    HCT 38.6 35.0 - 47.0 %    MCV 89.6 80.0 - 99.0 FL    MCH 29.0 26.0 - 34.0 PG    MCHC 32.4 30.0 - 36.5 g/dL    RDW 18.0 (H) 11.5 - 14.5 %    PLATELET 625 (L) 129 - 400 K/uL    NEUTROPHILS 14 (L) 32 - 75 %    LYMPHOCYTES 53 (H) 12 - 49 %    MONOCYTES 21 (H) 5 - 13 %    EOSINOPHILS 10 (H) 0 - 7 %    BASOPHILS 2 (H) 0 - 1 %    ABS. NEUTROPHILS 0.4 (L) 1.8 - 8.0 K/UL    ABS. LYMPHOCYTES 1.2 0.8 - 3.5 K/UL    ABS. MONOCYTES 0.5 0.0 - 1.0 K/UL    ABS. EOSINOPHILS 0.3 0.0 - 0.4 K/UL    ABS. BASOPHILS 0.1 0.0 - 0.1 K/UL    RBC COMMENTS ANISOCYTOSIS  1+        DF SMEAR SCANNED       ANC 0.4, Notified STEVIE Kerns NP. Orders to hold chemo today, resume in one week. Pt will need Neulasta injection, after insurance approval.    Pt tolerated treatment well.  Port flushed with NS and Sodium Citrate and needle removed, 2x2 and paper tape placed.  Pt discharged ambulatory in no acute distress at 1130, accompanied by family member. Next appointment 3/15/17 @ 1000.

## 2017-03-08 NOTE — PROGRESS NOTES
Problem: Chemotherapy Treatment  Goal: *Chemotherapy regimen followed  Outcome: Not Progressing Towards Goal  Chemo held due to low ANC. Will resume in one week.

## 2017-03-08 NOTE — PROGRESS NOTES
69 y/o cauc female here for f/u appt for met colon cancer. She is on folfox and vectibix cycle 3. reports mild fatigue. Fingernails are tender, she puts bandaids while at work and face is reddened with small splotches around her face that itch, she put utterly hand smooth cream on her face but it has not worked much.  mouth tenderness noted but no sores,not interfering with eating. A cough noted. Ms. Raimundo Yen has a reminder for a \"due or due soon\" health maintenance. I have asked that she contact her primary care provider for follow-up on this health maintenance.   GERARDO FROM DR Ansley Fatima DOXYCYCLINE (ADOXA) 100M TAB TAKE 1 TAB BY MOUTH BID X 14 DAYS DISPENSE 28 NO REFILLS

## 2017-03-14 PROBLEM — T78.2XXA ANAPHYLACTIC SHOCK: Status: ACTIVE | Noted: 2017-01-01

## 2017-03-14 NOTE — PROGRESS NOTES
Pt will need to get neulasta at a different facility, she is covered only for one injection in our facility. I sent this information to [facility] after speaking with [facility]. He said he has noticed some of [employer] not covering certain infusions. Information was sent over and he will look into this. I called pt and informed of this and gave her the number to [facility] as well as encouraged her to ask more questions to them and the insurance company. I then inquired about her face and if the doxyclycline has helped and she said it has. She is now on an ABT for a UTI, her PCP put her on this. She will call if she has any questions.

## 2017-03-14 NOTE — ED NOTES
Assumed care of pt at this time, received bedside report from [facility], [address] [place] [address] with pt included in care. Pt is resting in room, with call bell in reach. All questions answered.

## 2017-03-14 NOTE — IP AVS SNAPSHOT
[address] [facility] 
[phone] Patient: Jayla High MRN: [id number] MUM:[birth year] You are allergic to the following Allergen Reactions Beef Containing Products Anaphylaxis Heparin (Porcine) Hives Heparin beef starts with hives and can lead to anaphylatic shock Iodinated Contrast Media - Oral And Iv Dye Rash Nausea and Vomiting Other (comments) Abdominal pain Pork/Porcine Containing Products Anaphylaxis Augmentin (Amoxicillin-Pot Clavulanate) Rash Full body rash, NV Bactrim (Sulfamethoprim Ds) Hives Erythromycin Hives Itching Ciprofloxacin Unknown (comments) Splitting headache Macrobid (Nitrofurantoin Monohyd/M-Cryst) Other (comments) Headaches, bad dreams Recent Documentation Height Weight Breastfeeding? BMI OB Status Smoking Status 1.626 m 71.4 kg No 27.02 kg/m2 Postmenopausal Former Smoker Unresulted Labs Order Current Status CULTURE, URINE In process Emergency Contacts Name Discharge Info Relation Home Work Mobile Burton Pradhan DISCHARGE CAREGIVER [3] Son [22] [phone] [facility] DISCHARGE CAREGIVER [3] Son [22] [phone] Henri Whatley  Child [2] [phone] About your hospitalization You were admitted on:  March 14, 2017 You last received care in the:  [facility] You were discharged on:  March 15, 2017 Unit phone number:  [phone] Why you were hospitalized Your primary diagnosis was: Anaphylactic Shock Your diagnoses also included:  Metastatic Colon Cancer In Female (Hcc), History Of Uti, Alpha Galactosidase Deficiency (Hcc), Urticaria Of Unknown Origin Providers Seen During Your Hospitalizations Provider Role Specialty Primary office phone Joni Roach MD Attending Provider Emergency Medicine [phone] Do Chaudhry MD Attending Provider Hospitalist [phone] Your Primary Care Physician (PCP) Primary Care Physician Office Phone Office Fax Ben Sanchez [phone] Follow-up Information Follow up With Details Comments Contact Info Scout Montanez MD In 1 week Your appointment is scheduled for March 23rd at 145pm [address] [employer] [address] [phone] Fab Griffith MD  call office for follow up on your chemotherapy [address] [facility] [address] [employer] [phone] Jak Rizvi MD  call to further evaluate antibiotic allergies [address] [facility] [address] [facility] 13 
[phone] Your Appointments Friday March 17, 2017  4:00 PM EDT INFUSION 15 RI with CECELIA FAST TRACK/FLOAT NURSE  
[facility] CECELIA ([address]) [address]  
[phone] Go to [facility], [facility] [address], [address] [place] [address] [address] Wednesday March 22, 2017  9:00 AM EDT INFUSION 360 with CECELIA INFUSION NURSE 1  
[place] ([address]) [address]  
[phone] Go to [facility], [address], [address] [address] [address] Friday March 31, 2017  4:00 PM EDT INFUSION 15 RI with CECELIA FAST TRACK/FLOAT NURSE  
[facility] [place] ([address]) [address]  
[phone] Go to [facility], [facility] [address], [address] [facility] [address] Wednesday April 05, 2017  9:00 AM EDT INFUSION 360 with [place] INFUSION NURSE 1  
[place] ([address]) [address] [facility] [address] [facility] [address]  
[phone] Go to [facility], [facility], [address] [facility], [address] Current Discharge Medication List  
  
CONTINUE these medications which have NOT CHANGED Dose & Instructions Dispensing Information Comments Morning Noon Evening Bedtime  
 acetaminophen 500 mg tablet Commonly known as:  TYLENOL Your last dose was: Your next dose is: Take  by mouth every six (6) hours as needed for Pain. Refills:  0 ALLEGRA-D 12 HOUR  mg Tb12 Generic drug:  fexofenadine-pseudoephedrine Your last dose was: Your next dose is:    
   
   
 Dose:  1 Tab Take 1 Tab by mouth every twelve (12) hours. Refills:  0  
     
   
   
   
  
 oxyCODONE IR 5 mg immediate release tablet Commonly known as:  Danne Copper Your last dose was: Your next dose is:    
   
   
 Dose:  5 mg Take 1 Tab by mouth every four (4) hours as needed for Pain. Max Daily Amount: 30 mg.  
 Quantity:  60 Tab Refills:  0 STOP taking these medications AUGMENTIN PO  
   
  
 doxycycline 100 mg tablet Commonly known as:  ADOXA  
   
  
 predniSONE 10 mg tablet Commonly known as:  [place] [employer] Discharge Instructions Patient Discharge Instructions Pt Name  Gemini Littlejohn Date of Birth 1945 Age  70 y.o. Medical Record Number  [id number] PCP Ismael Salazar MD   
Admit date:  3/14/2017 @    [address] [facility] Room Number  2243/01 Date of Discharge 3/15/2017 Admission Diagnoses:     Anaphylactic shock Allergies Allergen Reactions  Beef Containing Products Anaphylaxis  Heparin (Porcine) Hives Heparin beef starts with hives and can lead to anaphylatic shock  Iodinated Contrast Media - Oral And Iv Dye Rash, Nausea and Vomiting and Other (comments) Abdominal pain  Pork/Porcine Containing Products Anaphylaxis  Augmentin [Amoxicillin-Pot Clavulanate] Rash Full body rash, NV  
 Bactrim [Sulfamethoprim Ds] Hives  Erythromycin Hives and Itching  Ciprofloxacin Unknown (comments) Splitting headache  Macrobid [Nitrofurantoin Monohyd/M-Cryst] Other (comments) Headaches, bad dreams You were admitted to [address] for  Anaphylactic shock YOUR OTHER MEDICAL DIAGNOSES INCLUDE (BUT NOT LIMITED TO ): 
Present on Admission:  Anaphylactic shock  Metastatic colon cancer in female [facility])  History of UTI  Alpha galactosidase deficiency ([facility] [address]  Urticaria of unknown origin ** AVOID RED MEAT; VENISON, PORK, LAMB, BEEF 
 
DIET:  Regular Diet Recommended activity: Activity as tolerated Follow up : Follow-up Information Follow up With Details Comments Contact Info Kiara Daugherty MD In 1 week  [address] [facility] 
[phone] Rosangela Anders MD  call office for follow up on your chemotherapy [address] [facility] [address] [facility] 
[phone] Alycia Worley MD  call to further evaluate antibiotic allergies [address] [place] [address] [address] [facility] 13 
[phone] ** You have multiple antibiotic allergies. It would be beneficial to revisit Dr. Amari Mills to evaluate for your antibiotic allergies. · It is important that you take the medication exactly as they are prescribed. · Keep your medication in the bottles provided by the pharmacist and keep a list of the medication names, dosages, and times to be taken in your wallet. · Do not take other medications without consulting your doctor.   
 
 
ADDITIONAL INFORMATION: If you experience any of the following symptoms or have any health problem not listed below, then please call your primary care physician or return to the emergency room if you cannot get hold of your doctor: Fever, chills, nausea, vomiting, diarrhea, change in mentation, falling, bleeding, shortness of breath. I understand that if any problems occur once I am discharged, I am supposed to call my Primary care physician for further care or seek help in the Emergency Department at the nearest Healthcare facility. I have had an opportunity to discuss my clinical issues with my doctor and nursing staff. I understand and acknowledge receipt of the above instructions. Physician's or R.N.'s Signature                                                            Date/Time Patient or Representative Signature                                                 Date/Time Discharge Orders None [employer] Announcement We are excited to announce that we are making your provider's discharge notes available to you in [employer]. You will see these notes when they are completed and signed by the physician that discharged you from your recent hospital stay. If you have any questions or concerns about any information you see in [employer], please call the Health Information Department where you were seen or reach out to your Primary Care Provider for more information about your plan of care. Introducing [facility]! Dear Augustina Presume: 
Thank you for requesting a [employer] account. Our records indicate that you already have an active [employer] account. You can access your account anytime at [email or web address] Did you know that you can access your hospital and ER discharge instructions at any time in MyChart? You can also review all of your test results from your hospital stay or ER visit. Additional Information If you have questions, please visit the Frequently Asked Questions section of the [employer] website at [email or web address] Remember, MyChart is NOT to be used for urgent needs. For medical emergencies, dial 911. Now available from your iPhone and Android! General Information Please provide this summary of care documentation to your next provider. Patient Signature:  ____________________________________________________________ Date:  ____________________________________________________________  
  
[facility]isa [facility] Provider Signature:  ____________________________________________________________ Date:  ____________________________________________________________

## 2017-03-14 NOTE — ED PROVIDER NOTES
HPI Comments: Omayra Mora is a 70 y.o. female with PMhx significant for anemia, thyroid disease, arthritis, colon cancer, and neuropathy who presents ambulatory to the ED c/o an acute onset of nausea, face feeling burning hot, and vomiting x1130 this morning. She reports that her entire body turned red then she noticed that her BL fingertips were cyanotic 5 minutes prior to being seen by Ye Mullen MD. The pt expresses that she had taken Augmentin for a diagnosed UTI around 0930 noting that 2 hours later she noticed the onset of sx. She denies any pain in her BL fingertips or being placed on any new medications aside from the Augmentin. The pt denies any h/o similar sx. She specifically denies any fevers, chills, chest pain, shortness of breath, headache, rash, diarrhea, or weight loss. PCP: Halina Fontaine MD  Oncology: Esa Emery MD      There are no other complaints, changes or physical findings at this time. Written by Keith Lopez ED Scribe, as dictated by Austin Palma. Chelsea Mullen MD     The history is provided by the patient. No  was used. Past Medical History:   Diagnosis Date    Anemia 5-3-13    Hgb 7.4         received transfuion of two units    Anemia NEC     Arthritis     mild- low back    Cancer (HCC)     colon cancer    GERD (gastroesophageal reflux disease)     at times    Liver disease     spots on liver    Neuropathy     Thyroid disease     nodules       Past Surgical History:   Procedure Laterality Date    ABDOMEN SURGERY PROC UNLISTED  16    lap resection of abdominal mass with ileocolic/small bowel resection.     COLONOSCOPY N/A 2016    COLONOSCOPY performed by Hermann [facility]uela [facility] at [facility] ENDOSCOPY    HX CERVICAL FUSION      anterior cervical disc and fusion    HX  SECTION      HX GI  13    COLON RESECTION LAPAROSCOPIC RIGHT     HX VASCULAR ACCESS      Portacath         Family History:   Problem Relation Age of Onset    Cancer Sister      colon    Colon Cancer Sister     Diabetes Mother     Heart Disease Father     Hypertension Father     Stroke Father     Diabetes Father     Colon Cancer Sister     Cancer Sister      lung       Social History     Social History    Marital status: LEGALLY      Spouse name: N/A    Number of children: N/A    Years of education: N/A     Occupational History    Not on file. Social History Main Topics    Smoking status: Former Smoker     Packs/day: 1.00     Years: 20.00     Quit date: 6/14/1988    Smokeless tobacco: Never Used      Comment: quit 30 yrs ago    Alcohol use 7.0 oz/week     14 Cans of beer per week      Comment: couple beers/day    Drug use: No    Sexual activity: Not on file     Other Topics Concern    Not on file     Social History Narrative         ALLERGIES: Beef containing products; Heparin (porcine); Iodinated contrast media - oral and iv dye; Pork/porcine containing products; Augmentin [amoxicillin-pot clavulanate]; Bactrim [sulfamethoprim ds]; Erythromycin; Ciprofloxacin; and Macrobid [nitrofurantoin monohyd/m-cryst]    Review of Systems   Constitutional: Positive for diaphoresis (face). Negative for activity change, appetite change, chills, fatigue, fever and unexpected weight change. HENT: Negative. Negative for congestion, hearing loss, rhinorrhea, sneezing and voice change. Eyes: Negative. Negative for pain and visual disturbance. Respiratory: Negative. Negative for apnea, cough, choking, chest tightness and shortness of breath. Cardiovascular: Negative. Negative for chest pain and palpitations. Gastrointestinal: Positive for nausea and vomiting. Negative for abdominal distention, abdominal pain, blood in stool and diarrhea. Genitourinary: Negative. Negative for difficulty urinating, flank pain, frequency and urgency. No discharge   Musculoskeletal: Negative.   Negative for arthralgias, back pain, myalgias and neck stiffness. Skin: Positive for color change (diffuse erythema ). Negative for rash. (+) cyanosis of BL fingertips    Neurological: Negative. Negative for dizziness, seizures, syncope, speech difficulty, weakness, numbness and headaches. Hematological: Negative for adenopathy. Psychiatric/Behavioral: Negative. Negative for agitation, behavioral problems, dysphoric mood and suicidal ideas. The patient is not nervous/anxious. Physical Exam   Constitutional: She is oriented to person, place, and time. She appears well-developed and well-nourished. No distress. HENT:   Head: Normocephalic and atraumatic. Mouth/Throat: Oropharynx is clear and moist. No oropharyngeal exudate. Eyes: Conjunctivae and EOM are normal. Pupils are equal, round, and reactive to light. Right eye exhibits no discharge. Left eye exhibits no discharge. Neck: Normal range of motion. Neck supple. Cardiovascular: Regular rhythm and intact distal pulses. Tachycardia present. Exam reveals no gallop and no friction rub. No murmur heard. Pulmonary/Chest: Effort normal and breath sounds normal. No respiratory distress. She has no wheezes. She has no rales. She exhibits no tenderness. Abdominal: Soft. Bowel sounds are normal. She exhibits no distension and no mass. There is no tenderness. There is no rebound and no guarding. Musculoskeletal: Normal range of motion. She exhibits no edema. Lymphadenopathy:     She has no cervical adenopathy. Neurological: She is alert and oriented to person, place, and time. No cranial nerve deficit. Coordination normal.   Skin: Skin is warm and dry. No rash noted. There is cyanosis (mild to BL fingertips). No erythema. Mild diffuse erythema    Psychiatric: She has a normal mood and affect. Nursing note and vitals reviewed.        MDM  Number of Diagnoses or Management Options  Anaphylactic reaction, initial encounter:   Diagnosis management comments: DDx: Allergic reaction. Amount and/or Complexity of Data Reviewed  Clinical lab tests: ordered and reviewed  Review and summarize past medical records: yes  Discuss the patient with other providers: yes (Hospitalist )    Critical Care  Total time providing critical care: 30-74 minutes    Patient Progress  Patient progress: other (comment)    ED Course       Procedures    Chief Complaint   Patient presents with    Allergic Reaction     pt reported she took augmentin this morning for a UTI and started have itching everywhere, vomiting and severe abdominal pain. 3:26 PM  The patients presenting problems have been discussed, and they are in agreement with the care plan formulated and outlined with them. I have encouraged them to ask questions as they arise throughout their visit. MEDICATIONS GIVEN:  Medications   ondansetron (ZOFRAN) 4 mg/2 mL injection (not administered)   promethazine (PHENERGAN) 25 mg in 0.9% sodium chloride 50 mL IVPB (0 mg IntraVENous Held 3/15/17 0030)   sodium chloride 0.9 % bolus infusion 1,000 mL (0 mL IntraVENous IV Completed 3/14/17 1919)   sodium chloride 0.9 % bolus infusion 1,000 mL (0 mL IntraVENous IV Completed 3/14/17 1616)   diphenhydrAMINE (BENADRYL) injection 25 mg (25 mg IntraVENous Given 3/14/17 1411)   methylPREDNISolone (PF) (SOLU-MEDROL) injection 125 mg (125 mg IntraVENous Given 3/14/17 1411)   famotidine (PF) (PEPCID) 20 mg in sodium chloride 0.9 % 10 mL injection (20 mg IntraVENous Given 3/14/17 1411)   ondansetron (ZOFRAN) injection 4 mg (4 mg IntraVENous Given 3/14/17 1536)   sodium chloride 0.9 % bolus infusion 1,000 mL (0 mL IntraVENous IV Completed 3/14/17 1814)   EPINEPHrine HCl (PF) (ADRENALIN) 1 mg/mL (1 mL) injection 0.2 mg (0.2 mg IntraMUSCular Given 3/14/17 1553)   diphenhydrAMINE (BENADRYL) injection 25 mg (25 mg IntraVENous Given 3/14/17 1601)       LABS REVIEWED:  No results found for this or any previous visit (from the past 24 hour(s)).      EKG interpretation: (Preliminary)  1610  Rhythm: normal sinus rhythm; and regular . Rate (approx.): 98; Axis: normal; WI interval: normal; QRS interval: normal ; ST/T wave: normal; Other findings: normal.   Written by Shahab Anderson. LATRELL Lopez Scribe as dictated by Anne Marie Lowe. Eva Negron MD       VITAL SIGNS:  No data found. CONSULTATIONS:     CONSULT NOTE:   4:21 PM  Ye Negron MD spoke with Dr. Nestor Conley,   Specialty: Hospitalist  Discussed pt's hx, disposition, and available diagnostic and imaging results. Reviewed care plans. Consultant will evaluate pt for admission. Written by Blanca Lewis MD, ED Scribe, as dictated by Niranjan Negron MD.       PROGRESS NOTES:    PROGRESS NOTE:  3:37 PM  Pt has been re-evaluated. Upon re evaluation the pt is now vomiting, will order her Zofran at this time. Written by LATRELL Jimenez, as dictated by Anne Marie Lowe. Eva Negron MD.       CRITICAL CARE NOTE :  3:43 PM  IMPENDING DETERIORATION -Airway and Cardiovascular  ASSOCIATED RISK FACTORS - Hypotension and Shock  MANAGEMENT- Bedside Assessment and Supervision of Care  INTERPRETATION -  ECG, Blood Pressure and Cardiac Output Measures   INTERVENTIONS - hemodynamic mngmt and Metobolic interventions  CASE REVIEW - Hospitalist  TREATMENT RESPONSE -Improved and Stable  PERFORMED BY - Self  NOTES   :  I have spent 45 minutes of critical care time involved in lab review, consultations with specialist, family decision- making, bedside attention and documentation. During this entire length of time I was immediately available to the patient . Written by LATRELL Jimenez as dictated by Anne Marie Lowe. Eva Negron MD     DIAGNOSIS:    1. Anaphylactic reaction, initial encounter        PLAN: Admission     ED COURSE: The patients hospital course has been uncomplicated.      4:22 PM  Patient is being admitted to the hospital.  The results of their tests and reasons for their admission have been discussed with them and/or available family. They convey agreement and understanding for the need to be admitted and for their admission diagnosis. Consultation has been made with the inpatient physician specialist for hospitalization. Attestation: This note is prepared by Edelmira Lentz. John, acting as Scribe for [employer] KALLI Burt/La Adkins MD: The scribe's documentation has been prepared under my direction and personally reviewed by me in its entirety. I confirm that the note above accurately reflects all work, treatment, procedures, and medical decision making performed by me.

## 2017-03-15 PROBLEM — E88.09 ALPHA GALACTOSIDASE DEFICIENCY: Status: ACTIVE | Noted: 2017-01-01

## 2017-03-15 PROBLEM — Z87.440 HISTORY OF UTI: Status: ACTIVE | Noted: 2017-01-01

## 2017-03-15 PROBLEM — L50.9 URTICARIA OF UNKNOWN ORIGIN: Status: ACTIVE | Noted: 2017-01-01

## 2017-03-15 NOTE — CONSULTS
Oncology Inpatient Consult Note      Patient: Karen Fong MRN: [id number]  SSN: [id number]    YOB: 1945  Age: 70 y.o. Sex: female        Subjective:      Karen Fong is a 79 y.o. female with metastatic colon adenocarcinoma that we were asked to see by Dr. Meka Tyler. She was initially diagnosed with high risk Stage II disease and underwent a partial colectomy on 05/23/2013. All 25 LN were uninvolved with disease but the tumor penetrated the serosa into the pericolic fat and there was focal invasion in the appendix as well as the small bowel. Ms. Rosa Isela Rodriguez completed 6 months of adjuvant mFOLFOX . She had recurrent disease in the abdomen around the previous site of disease. She underwent laparoscopic resection of the small bowel and colon in Jan 2016. The tumor was found to perforate the small bowel and there was some metastatic deposit. Ms. Rosa Isela Rodriguez experienced abdominal pain and subsequent CT showed recurrence of disease in the liver, peritoneum, and ovaries. She resumed chemotherapy with FOLFIRI + Avastin. Repeat scans showed disease progression. She is now receiving FOLFOX + Vectibix and is tolerating treatment well. Ms. Rosa Isela Rodriguez presented to the ED yesterday with anaphylaxis and shock after taking one dose of augmentin prescribed by her PCP for a UTI. She had acute onset of N/V, diarrhea, and flushing. She was hypotensive upon arrival to the ED. She responded to IVF, steroids, benadryl, and IM epinephrine. Subsequently, she was admitted to the hospitalist service for further evaluation. Ms. Rosa Isela Rodriguez feels much better today with her son at the bedside and is being discharged home. She had been scheduled for chemotherapy today.         Review of Systems:     Constitutional: fatigue  Eyes: negative   Ears, Nose, Mouth, Throat, and Face: negative   Respiratory: negative   Cardiovascular: negative   Gastrointestinal: negative  Integument/Breast: negative  Hematologic/Lymphatic: negative Musculoskeletal:negative   Neurological: negative         Past Medical History:   Diagnosis Date    Anemia 5-3-13    Hgb 7.4         received transfuion of two units    Anemia NEC     Arthritis     mild- low back    Cancer (HCC)     colon cancer    GERD (gastroesophageal reflux disease)     at times    Liver disease     spots on liver    Neuropathy     Thyroid disease     nodules     Past Surgical History:   Procedure Laterality Date    ABDOMEN SURGERY PROC UNLISTED  16    lap resection of abdominal mass with ileocolic/small bowel resection.  COLONOSCOPY N/A 2016    COLONOS[facility] at [facility] ENDOSCOPY    HX CERVICAL FUSION      anterior cervical disc and fusion    HX  SECTION      HX GI  13    COLON RESECTION LAPAROSCOPIC RIGHT     HX VASCULAR ACCESS      Portacath      Family History   Problem Relation Age of Onset    Cancer Sister      colon    Colon Cancer Sister     Diabetes Mother     Heart Disease Father     Hypertension Father     Stroke Father     Diabetes Father     Colon Cancer Sister     Cancer Sister      lung     Social History   Substance Use Topics    Smoking status: Former Smoker     Packs/day: 1.00     Years: 20.00     Quit date: 1988    Smokeless tobacco: Never Used      Comment: quit 30 yrs ago    Alcohol use 7.0 oz/week     14 Cans of beer per week      Comment: couple beers/day      Prior to Admission medications    Medication Sig Start Date End Date Taking? Authorizing Provider   fexofenadine-pseudoephedrine (ALLEGRA-D 12 HOUR)  mg Tb12 Take 1 Tab by mouth every twelve (12) hours. Yes Historical Provider   AMOXICILLIN/POTASSIUM CLAV (AUGMENTIN PO) Take  by mouth. Yes Phys Raphael, MD   doxycycline (ADOXA) 100 mg tablet Take 1 Tab by mouth two (2) times a day for 14 days. 3/8/17 3/22/17  Renita Starr NP   predniSONE (DELTASONE) 10 mg tablet Take 5 Tabs by mouth See Admin Instructions.  Take 50 mg 13, 7, and 1 hour prior to CT scan 1/11/17   Isiah Feliciano MD   oxyCODONE IR (ROXICODONE) 5 mg immediate release tablet Take 1 Tab by mouth every four (4) hours as needed for Pain. Max Daily Amount: 30 mg. 11/23/16   Grecia Pérez NP   acetaminophen (TYLENOL) 500 mg tablet Take  by mouth every six (6) hours as needed for Pain. Historical Provider          Allergies   Allergen Reactions    Beef Containing Products Anaphylaxis    Heparin (Porcine) Hives     Heparin beef starts with hives and can lead to anaphylatic shock    Iodinated Contrast Media - Oral And Iv Dye Rash, Nausea and Vomiting and Other (comments)     Abdominal pain    Pork/Porcine Containing Products Anaphylaxis    Augmentin [Amoxicillin-Pot Clavulanate] Rash     Full body rash, NV    Bactrim [Sulfamethoprim Ds] Hives    Erythromycin Hives and Itching    Ciprofloxacin Unknown (comments)     Splitting headache    Macrobid [Nitrofurantoin Monohyd/M-Cryst] Other (comments)     Headaches, bad dreams           Objective:     Vitals:    03/15/17 0058 03/15/17 0059 03/15/17 0307 03/15/17 0824   BP: 118/65 119/67 109/61 102/60   Pulse: 85 85 65 70   Resp:   18 18   Temp:   98.1 °F (36.7 °C) 98.2 °F (36.8 °C)   SpO2: 100% 97% 98% 97%   Weight:       Height:              Physical Exam:    GENERAL: alert, cooperative   EYE: conjunctivae/corneas clear. LYMPHATIC: Cervical, supraclavicular, and axillary nodes normal.   THROAT & NECK: normal and no erythema or exudates noted. LUNG: clear to auscultation bilaterally   HEART: regular rate and rhythm   ABDOMEN: soft, non-tender. EXTREMITIES: extremities normal   SKIN: Normal.   NEUROLOGIC: negative         IMAGING:    CT Results (most recent):    Results from Hospital Encounter encounter on 01/23/17   CT ABD PELV W CONT   Narrative INDICATION: Follow-up metastatic colon cancer. COMPARISON: 11/16/2016.     TECHNIQUE:  Following the uneventful intravenous administration of 96 cc  Isovue-370, 5 mm axial images were obtained through the chest, abdomen, and  pelvis. Oral contrast was not administered. CT dose reduction was achieved  through use of a standardized protocol tailored for this examination and  automatic exposure control for dose modulation. FINDINGS:    THYROID: No nodule. MEDIASTINUM: No change in subcentimeter pericardiophrenic lymph nodes. Otherwise  unremarkable. KENDALL: No mass or lymphadenopathy. THORACIC AORTA: No dissection or aneurysm. MAIN PULMONARY ARTERY: Normal in caliber. TRACHEA/BRONCHI: Patent. ESOPHAGUS: No wall thickening or dilatation. HEART: Normal in size. PLEURA: No effusion or pneumothorax. LUNGS: No nodule, mass, or airspace disease. LIVER: Multiple mass lesions are redemonstrated. Subjectively there is overall  increase in tumor burden, however the largest lesion at the dome of the liver in  segment 8 has decreased in size from about 3.0 x 3.4 cm to 2.8 x 2.8 cm (series  2, image 47). A more inferior segment 8 lesion has increased in size from 1.9 x  2.5 cm to 2.3 x 2.8 cm (2, 51) and a segment 7 lesion has increased from 1.1 x  1.6 cm to 1.6 x 2.2 cm (2, 51). A caudate lobe lesion has increased from 1.5 x  1.8 cm to 1.8 x 2.3 cm (2, 51). GALLBLADDER: Collapsed and grossly unremarkable. .  SPLEEN: No mass. PANCREAS: No mass or ductal dilatation. ADRENALS: Unremarkable. KIDNEYS: Unremarkable. STOMACH: Small sliding hiatal hernia. Otherwise unremarkable. SMALL BOWEL: No dilatation or wall thickening. COLON: Status post partial colectomy with right-sided ileocolonic anastomosis  which appears as expected. APPENDIX: Surgically absent. PERITONEUM: Redemonstration of omental caking and intraperitoneal nodularity. Anterior midline peritoneal implant has increased from 1.1 x 1.3 cm to 1.3 x 1.5  cm (2, 96). Nodule in the rectouterine space is increased from approximately 1.2  x 1.6 cm to 1.4 x 1.9 cm (2, 103).  Presacral nodules appear grossly stable  measuring 11 x 12 mm and 10 x 12 mm (2, 95 and 96)  RETROPERITONEUM: No lymphadenopathy or aortic aneurysm. REPRODUCTIVE ORGANS: Uterus appears unremarkable. Right ovary appears more  normal measuring 2.4 x 3.7 cm as compared to prior exam measurements of 3.3 x  4.6 cm. Left ovary grossly unremarkable. URINARY BLADDER: No mass or calculus. BONES: Degenerative hip and spine change with no aggressive lesions. ADDITIONAL COMMENTS: Right Port-A-Cath in position with tip of catheter in mid  superior vena cava. Impression IMPRESSION: Overall there is been progression of disease with increase in size  of multiple hepatic metastases and intraperitoneal carcinomatosis with increased  size of peritoneal implants. Previously seen abnormal appearance of the right  ovary is improved and may reflect nonmetastatic process. No new sites of  metastatic disease identified. NOTE: Despite premedication, the patient had an allergic reaction to contrast  administration. I examined the patient in radiology holding with note of diffuse  skin flushing, conjunctiva congestion, and tachycardia with heart rate of 97  bpm. No wheezing or shortness of breath. LABS:  Lab Results   Component Value Date/Time    WBC 10.2 03/14/2017 02:08 PM    Hemoglobin (POC) 13.3 01/11/2017 08:36 AM    HGB 14.6 03/14/2017 02:08 PM    Hematocrit (POC) 39 01/11/2017 08:36 AM    HCT 43.7 03/14/2017 02:08 PM    PLATELET 796 34/11/0745 02:08 PM    MCV 90.5 03/14/2017 02:08 PM         Assessment:      1. Anaphylaxis with shock after dose of Augmentin    > Acute episode resolved  > Avoid PCN in the future      2. Metastatic recurrent colon carcinoma, 3/2016   HOLLIS wild type, Her 2 mutated, MSI stable    3. Local recurrent of colon carcinoma, 01/2016   T4b N1c,Tumor deposits: Present, all 9 LN -ve    4.  Initially diagnosed Colon adenocarcinoma: 05/2013  T4a N0 M0 (Stage II); all 25 LN clear of disease   High risk features - T4a, serosal penetration, involvement of adjacent organs - small bowel and appendix, moderate to poor differentiation     ECOG PS 0  Intent of treatment - palliative    Systemic chemotherapy   FOLFIRI, s/p 3 cycles    Irinotecan reduced 25% d/t neutropenia (5/11/2016)   FOLFIRI + Avastin s/p 8 Cycles     Irinotecan reduced 50% d/t neutropenia (7/6/2016)   5-FU s/p 2 cycles    FOLFIRI + Avastin - s/p 4 Cycles - stopped d/t disease progression    CT Chest, Abd, Pelv (1/23/2017) - disease progression in the liver and intraperitoneal carcinomatosis    Receiving FOLFOX + Panitumumab s/p 2 cycles      Plan:        > Resume chemotherapy with FOLFOX + Panitumumab next Wednesday - OPI will call with infusion time  > Okay for discharge home  > Will follow-up outpatient at time of treatment        Signed by: Avani Lynn NP                     March 15, 2017           Attending Physician Note:     I have reviewed the history, physical examination, assessment and the plan of the care of the patient. I saw the patient with Dorene Bobo and I participated in the examination and critical decision making. I agree with the note as stated above. Eloina Sanchez is a women with metastatic colon ca. She developed severe allergic reaction to Augmentin. She is feeling better and is going home.          Signed by: Isiah Feliciano MD                     March 15, 2017

## 2017-03-15 NOTE — PROGRESS NOTES
TORIN Initial Assessment        Current admission assessment-- Patient came in for acute onset nausea, face feeling like it was burning hot and vomiting. She reports her entire body turned red then noticed bl fingertips were cyanotic. She states she took Augmentin for uti and noticed the symptoms two hours later. She lives in two story home with her son. She states her son works. Patient states she is independent in adl's. She works and she drives . She gets her medications filled at [facility] in [place] or [address] [place] [address] on [place]  She denies using dme. She denies using home health or rehab in the past. She is being discharged home today and states her son will be picking her up. She will call her oncologist to set up her next chemo and she will get in touch with her md about the allergies. Called Dr Guilherme Gerber office to schedule patient's follow up appointment and it is scheduled for March 23rd at 145pm. Patient aware and information placed on AVS.           Care Management Interventions  PCP Verified by CM: Yes  Mode of Transport at Discharge:  (Patient is being discharged home today, she states her son will be picking her up. )  Current Support Network: Other (Her son lives with her. )  Confirm Follow Up Transport: Family  Plan discussed with Pt/Family/Caregiver: Yes  Discharge Location  Discharge Placement: Home             .         VCStowers-[place]erby RNBSNCRM EXT [id number]

## 2017-03-15 NOTE — ED NOTES
TRANSFER - OUT REPORT:    Verbal report given to NEHEMIAH Mclain(name) on Susan Whitehead  being transferred to PCU RM 2243(unit) for routine progression of care       Report consisted of patients Situation, Background, Assessment and   Recommendations(SBAR). Information from the following report(s) SBAR, Kardex, ED Summary, Procedure Summary, MAR, Recent Results and Med Rec Status was reviewed with the receiving nurse. Lines:   Venous Access Device port 09/07/16 (Active)       Peripheral IV 03/14/17 Left Hand (Active)   Site Assessment Clean, dry, & intact 3/14/2017  2:02 PM   Phlebitis Assessment 0 3/14/2017  2:02 PM   Dressing Status Clean, dry, & intact 3/14/2017  2:02 PM   Dressing Type Transparent 3/14/2017  2:02 PM   Hub Color/Line Status Blue 3/14/2017  2:02 PM       Peripheral IV 03/14/17 Right Antecubital (Active)   Site Assessment Clean, dry, & intact 3/14/2017  3:59 PM   Phlebitis Assessment 0 3/14/2017  3:59 PM   Infiltration Assessment 0 3/14/2017  3:59 PM   Dressing Status Clean, dry, & intact 3/14/2017  3:59 PM   Dressing Type Transparent 3/14/2017  3:59 PM   Hub Color/Line Status Pink 3/14/2017  3:59 PM        Opportunity for questions and clarification was provided.       Patient transported with:   Registered Nurse  Tech

## 2017-03-15 NOTE — PROGRESS NOTES
Pt admitted to PCU room 2243 from ER. Oriented to staff, call light function, bathroom, unit routines and plan of care for the remainder of the night. 0030  Assessment completed as charted. Pt denies N/V/D. Orthostatic blood pressures taken. No syncope noted. Dual skin assessment completed by myself and Ge Billingsley. Pt has no skin breakdown or sores. Frandy scale is 22. Pt does not wish to have IV Phenergan that was sent up with her from ER. Says she doesn't need it at this time, denies nausea. 0330 VSS. Assisted up to bathroom, urine specimen obtained. Labs drawn at this time. 0600 Pt resting quietly without complaints. Denies n/v/d or sob.    0720  Bedside shift change report given to oncoming nurse.

## 2017-03-15 NOTE — PROGRESS NOTES
Received call from utilization review--patient status has changed from inpatient to observation. Spoke with patient and she states her [employer] is primary and Medicare is secondary because she still works. Hali [place] with utilization review and informed her since they had initiated code 40 to be given to patient. Chito Ortega will check with [facility] and she will get back with me . Code 40 paperwork held until Chito Ortega gets back with me.

## 2017-03-15 NOTE — PROGRESS NOTES
Emailed [employer] and now patient is showing [employer]BS primary and her Medicare as secondary. Informed Terrence Preston in ur and patient not given the code 40, moon letter and obs letter .

## 2017-03-15 NOTE — DISCHARGE SUMMARY
Hospitalist Discharge Summary     Patient ID:  Marilyn Adler  [id number]  70 y.o.  1945    PCP on record: Bjorn Sarah MD    Admit date: 3/14/2017  Discharge date and time: 3/15/2017      DISCHARGE DIAGNOSIS:  Anaphylaxis reaction after taking one dose of augmentin  Recent UTI  Recurrent Colon cancer with hepatic and intraperitoneal metastasis  S/P partial colectomy 4223  S/P ileocolic / SB resection 2368  Alpha Galactose Allergy  Idiopathic Urticaria  Multiple antibiotic allergies      CONSULTATIONS:  None    Excerpted HPI from H&P of Korey Cuevas MD:  Andres Abraham is a 70 y.o.  female with a history of recurrent colon cancer now with hepatic metastasis and intraperitoneal carcinomatosis who presents with acute onset of NV and feeling flushed or hot. Patient has a history of multiple allergies to drugs, contrast dye and to beef/pork (anaphylaxis). She saw her PCP yesterday for a UTI and was prescribed augmentin which she filled this morning and took her first dose at 9:30am. She previously has tolerated amoxicillin but she has never tried augmentin. At around 11:30am she started to experience nausea, vomiting and felt her face was flushed or burning hot. Her entire body subsequently turned bright red but denies SOB, CP, dizziness or change in her voice. Family drove her to the ER and initial  /63. Her fingertips then became cyanotic and her BP dropped down to 82/46. No stridor or wheezing noted. She responded to IVF bolus x 3 liters, IV solumedrol, IV benadryl and IM epinephrine.   ______________________________________________________________________  DISCHARGE SUMMARY/HOSPITAL COURSE:  for full details see H&P, daily progress notes, labs, consult notes. 70years old female admitted to the hospital with anaphylactic reaction (nausea, vomiting, flushing sensation with developing generalized hives) after taking one dose of Augmentin.  Augmentin was prescribed on Monday by her pcp to treat UTI. Anaphylactic reaction treated with antihistamine, epinephrine and steroid. All of the symptoms completely resolved at this point. Patient has multiple antibiotic allergies/sensitivies. We highly recommend this patient to be seen by her allergist, Dr. Ju Mendoza for antibiotic allergy testing in near future. As of now, patient is completely asymptomatic for UTI; no urgency, burning, or spasmatic sensation. Her U/A revealed large leukocyste esterase, negative nitiries, with negative bacteria. Given her severity of recent reaction, patient would like to wait for final urine culture result before stat the treatment. This patient has alpha gall allergy. Patient raised the question about alpha gal allergy and heparin. Patient is currently undergoing chemotherapy treatment. She has been getting trisodium citrate flush since heparin is a   by product of pork driven element. I reminded the patient that there are not enough studies addressing the issue nor any reference of literature studies addressing this issue. Dr. Pete Alanis, well known allergist stated is unlikely that a sufficient amount of alpha-gal would be present in porcine heparin. I advised the patient that she can further discuss this issue with Dr. Ju Mendoza during her visit. At this point, she has been advised to avoid all PCN drugs. We will contact the patient once the urine culture is finalized. Below are the main diagnosis that patient was treated it during this hospitalization; Anaphylaxis with shock after dose of augmentin  -she has taken amoxicillin many times in the past but her presentation today is so convincing that I recommend that she avoid PCN going forward.   -no stridor or wheezing. Not hypoxic. Rash has resolved.  BP has recovered.   -continue IVF volume expansion  -continue scheduled IV benadryl and IV solumedrol  -discussed when and how to use the epi-pen; pt has her own epi-pen  -oxygen prn     Recent UTI  -hold on abx. Check UA and reflex culture     Recurrent Colon cancer with hepatic and intraperitoneal metastasis  S/P partial colectomy 4297  S/P ileocolic / SB resection 8574  -currently on chemotherapy. Follows with Dr Alon Argueta. Dr. Alon Argueta has been notified about this admission.     Alpha-gal allergy  - avoid red meat; lamb, venison, beef, and pork    Idiopathic urticaria   - take allegra in am and zyrtec in pm (per her allergist)      _______________________________________________________________________  Patient seen and examined by me on discharge day. Pertinent Findings:  Gen:    Not in distress  Chest: Clear lungs  CVS:   Regular rhythm. No edema  Abd:  Soft, not distended, not tender  Neuro:  Alert, orient x 4  _______________________________________________________________________  DISCHARGE MEDICATIONS:   Current Discharge Medication List      CONTINUE these medications which have NOT CHANGED    Details   fexofenadine-pseudoephedrine (ALLEGRA-D 12 HOUR)  mg Tb12 Take 1 Tab by mouth every twelve (12) hours. oxyCODONE IR (ROXICODONE) 5 mg immediate release tablet Take 1 Tab by mouth every four (4) hours as needed for Pain. Max Daily Amount: 30 mg.  Qty: 60 Tab, Refills: 0    Associated Diagnoses: Metastatic colon cancer in female [facility]); Right upper quadrant abdominal pain      acetaminophen (TYLENOL) 500 mg tablet Take  by mouth every six (6) hours as needed for Pain.          STOP taking these medications       AMOXICILLIN/POTASSIUM CLAV (AUGMENTIN PO) Comments:   Reason for Stopping:         doxycycline (ADOXA) 100 mg tablet Comments:   Reason for Stopping:         predniSONE (DELTASONE) 10 mg tablet Comments:   Reason for Stopping:               My Recommended Diet, Activity, Wound Care, and follow-up labs are listed in the patient's Discharge Insturctions which I have personally completed and reviewed.     _______________________________________________________________________  DISPOSITION:    Home with Family: y   Home with HH/PT/OT/RN:    SNF/LTC:    MIKAELA:    OTHER:        Condition at Discharge:  Stable  _______________________________________________________________________  Follow up with:   PCP : Genna Ye MD  Follow-up Information     Follow up With Details [address], [place] In 1 week  [address] [facility]  [address] [facility]      Taras Gomez MD  call office for follow up on your chemotherapy [facility] [address].   [facility]  [phone]      Shiva Begum MD  call to further evaluate antibiotic allergies [address] [facility] [address] [place] [address]  [facility] [address]  [phone]                Total time in minutes spent coordinating this discharge (includes going over instructions, follow-up, prescriptions, and preparing report for sign off to her PCP) :  40  minutes    Signed:  Jadon Rao NP

## 2017-03-15 NOTE — PROGRESS NOTES
Letter of Status Determination:    Recommend Changing patient status from INPATIENT to OBSERVATION           Pt Name:  Abhishek Castillo   MR#  [id number]   [facility]#   [id number]   [address] [id number]  @ [facility]   Admit date  3/14/2017  1:38 PM   Current Attending Physician  Daron Fischer MD   Principal diagnosis  Anaphylactic shock      Clinicals  70 y.o. y.o  female hospitalized with above diagnosis. Though the pt's response was dramatic, but her recovery had also been exceptional.   She had already recovered from shock when Dr. Deanne Bland had seen the patient. There were no respiratory distress, stridor or salivation recorded.      At this time we learned that the patient is being discharged      [facility] criteria   Does  NOT apply    STATUS DETERMINATION  On the basis of review of available clinical data, documentation in the chart  It is our recommendation that the patient's status is changed frm INPATIENT to OBSERVATION         The final decision of the patient's hospitalization status depends on the attending physician's judgment      Additional comments     Insurance  Payor: Linder Cheadle / Plan: [place] MEDICARE PART A / Product Type: Medicare /      Insurance Information                [facility] Phone:     Subscriber: Ruby David Subscriber#: [id number]    Group#: [id number] Precert#:         [address] Phone:     Subscriber: Ruby David Subscriber#: [id number]    Group#:  Precert#:                     Ganga Novoa MD MPH FACP     Physician 3 [facility]   President Medical Staff, [address] [place] [address]

## 2017-03-15 NOTE — DISCHARGE INSTRUCTIONS
Patient Discharge Instructions     Pt Name  Bryan Dooley   Date of Birth 1945   Age  70 y.o. Medical Record Number  [id number]   PCP Megha Drake MD    Admit date:  3/14/2017 @    [place] [address]    Room Number  2243/01   Date of Discharge 3/15/2017     Admission Diagnoses:     Anaphylactic shock          Allergies   Allergen Reactions    Beef Containing Products Anaphylaxis    Heparin (Porcine) Hives     Heparin beef starts with hives and can lead to anaphylatic shock    Iodinated Contrast Media - Oral And Iv Dye Rash, Nausea and Vomiting and Other (comments)     Abdominal pain    Pork/Porcine Containing Products Anaphylaxis    Augmentin [Amoxicillin-Pot Clavulanate] Rash     Full body rash, NV    Bactrim [Sulfamethoprim Ds] Hives    Erythromycin Hives and Itching    Ciprofloxacin Unknown (comments)     Splitting headache    Macrobid [Nitrofurantoin Monohyd/M-Cryst] Other (comments)     Headaches, bad dreams        You were admitted to [address] for  Anaphylactic shock    YOUR [place] (BUT NOT LIMITED TO ):  Present on Admission:   Anaphylactic shock   Metastatic colon cancer in female [facility])   History of UTI   Alpha galactosidase deficiency (HCC)   Urticaria of unknown origin    ** AVOID RED MEAT; VENISON, PORK, LAMB, BEEF    DIET:  Regular Diet   Recommended activity: Activity as tolerated  Follow up : Follow-up Information     Follow up With Details Comments Nehemias Flores MD In 1 week  [facility]  [address] [place] [address]      Jessie Maynard MD  call office for follow up on your chemotherapy [address].  [phone]      Guillermo Masterson MD  call to further evaluate antibiotic allergies [address] [place] [address] 59 [phone]          ** You have multiple antibiotic allergies.   It would be beneficial to revisit Dr. Arin Han to evaluate for your antibiotic allergies. · It is important that you take the medication exactly as they are prescribed. · Keep your medication in the bottles provided by the pharmacist and keep a list of the medication names, dosages, and times to be taken in your wallet. · Do not take other medications without consulting your doctor. ADDITIONAL INFORMATION: If you experience any of the following symptoms or have any health problem not listed below, then please call your primary care physician or return to the emergency room if you cannot get hold of your doctor: Fever, chills, nausea, vomiting, diarrhea, change in mentation, falling, bleeding, shortness of breath. I understand that if any problems occur once I am discharged, I am supposed to call my Primary care physician for further care or seek help in the Emergency Department at the nearest Healthcare facility. I have had an opportunity to discuss my clinical issues with my doctor and nursing staff. I understand and acknowledge receipt of the above instructions.                                                                                                                                            Physician's or R.N.'s Signature                                                            Date/Time                                                                                                                                              Patient or Representative Signature                                                 Date/Time

## 2017-03-15 NOTE — H&P
Hospitalist Admission Note    NAME: Deborah Beth   :  [birth year]   MRN:  [id number]     Date/Time:  3/14/2017 9:55 PM    Patient PCP: Keisha Christina MD  ________________________________________________________________________    My assessment of this patient's clinical condition and my plan of care is as follows. Assessment / Plan:    Anaphylaxis with shock after dose of augmentin  -she has taken amoxicillin many times in the past but her presentation today is so convincing that I recommend that she avoid PCN going forward.     -no stridor or wheezing. Not hypoxic. Rash has resolved. BP has recovered.   -continue IVF volume expansion  -continue scheduled IV benadryl and IV solumedrol  -close monitoring. IM epinephrine prn for recurrent hypotension  -oxygen prn    Recent UTI  -hold on abx. 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 Follows with Dr Pau Harris Status:  Full  Surrogate Decision Maker:  Son Ailyn Sosa is her mPOA    DVT Prophylaxis:  SCD. Allergic to heparin  GI Prophylaxis: not indicated          Subjective:   CHIEF COMPLAINT:   Rash and feeling hot    HISTORY OF PRESENT ILLNESS:     Sari Joel is a 70 y.o.  female with a history of recurrent colon cancer now with hepatic metastasis and intraperitoneal carcinomatosis who presents with acute onset of NV and feeling flushed or hot. Patient has a history of multiple allergies to drugs, contrast dye and to beef/pork (anaphylaxis). She saw her PCP yesterday for a UTI and was prescribed augmentin which she filled this morning and took her first dose at 9:30am.  She previously has tolerated amoxicillin but she has never tried augmentin. At around 11:30am she started to experience nausea, vomiting and felt her face was flushed or burning hot.    Her entire body subsequently turned bright red but denies SOB, CP, dizziness or change in her voice. Family drove her to the ER and initial  /63. Her fingertips then became cyanotic and her BP dropped down to 82/46. No stridor or wheezing noted. She responded to IVF bolus x 3 liters, IV solumedrol, IV benadryl and IM epinephrine. e were asked to admit for work up and evaluation of the above problems. Past Medical History:   Diagnosis Date    Anemia 5-3-13    Hgb 7.4         received transfuion of two units    Anemia NEC     Arthritis     mild- low back    Cancer (HCC)     colon cancer    GERD (gastroesophageal reflux disease)     at times    Liver disease     spots on liver    Neuropathy     Thyroid disease     nodules        Past Surgical History:   Procedure Laterality Date    ABDOMEN SURGERY PROC UNLISTED  16    lap resection of abdominal mass with ileocolic/small bowel resection.     COLONOSCOPY N/A 2016    COLONOSCOPY performed by Keri Granado [facility] at [facility] ENDOSCOPY    HX CERVICAL FUSION      anterior cervical disc and fusion    HX  SECTION      HX GI  13    COLON RESECTION LAPAROSCOPIC RIGHT     HX VASCULAR ACCESS      Portacath       Social History   Substance Use Topics    Smoking status: Former Smoker     Packs/day: 1.00     Years: 20.00     Quit date: 1988    Smokeless tobacco: Never Used      Comment: quit 30 yrs ago    Alcohol use 7.0 oz/week     14 Cans of beer per week      Comment: couple beers/day        Family History   Problem Relation Age of Onset    Cancer Sister      colon    Colon Cancer Sister     Diabetes Mother     Heart Disease Father     Hypertension Father     Stroke Father     Diabetes Father     Colon Cancer Sister     Cancer Sister      lung     Allergies   Allergen Reactions    Beef Containing Products Anaphylaxis    Heparin (Porcine) Hives     Heparin beef starts with hives and can lead to anaphylatic shock    Iodinated Contrast Media - Oral And Iv Dye Rash, Nausea and Vomiting and Other (comments)     Abdominal pain    Pork/Porcine Containing Products Anaphylaxis    Augmentin [Amoxicillin-Pot Clavulanate] Rash     Full body rash, NV    Bactrim [Sulfamethoprim Ds] Hives    Erythromycin Hives and Itching    Ciprofloxacin Unknown (comments)     Splitting headache    Macrobid [Nitrofurantoin Monohyd/M-Cryst] Other (comments)     Headaches, bad dreams        Prior to Admission medications    Medication Sig Start Date End Date Taking? Authorizing Provider   AMOXICILLIN/POTASSIUM CLAV (AUGMENTIN PO) Take  by mouth. Yes Nitin Lim MD   doxycycline (ADOXA) 100 mg tablet Take 1 Tab by mouth two (2) times a day for 14 days. 3/8/17 3/22/17  Savanah Solorio NP   predniSONE (DELTASONE) 10 mg tablet Take 5 Tabs by mouth See Admin Instructions. Take 50 mg 13, 7, and 1 hour prior to CT scan 1/11/17   Gabino Ovalle MD   oxyCODONE IR (ROXICODONE) 5 mg immediate release tablet Take 1 Tab by mouth every four (4) hours as needed for Pain. Max Daily Amount: 30 mg. 11/23/16   Savanah Solorio NP   acetaminophen (TYLENOL) 500 mg tablet Take  by mouth every six (6) hours as needed for Pain.     Historical Provider       REVIEW OF SYSTEMS:       Total of 12 systems reviewed as follows:       POSITIVE= underlined text  Negative = text not underlined  General:  Feeling flushed, chills, sweats, generalized weakness, weight loss/gain,      loss of appetite   Eyes:    blurred vision, eye pain, loss of vision, double vision  ENT:    rhinorrhea, pharyngitis   Respiratory:   cough, sputum production, SOB, [place], wheezing, pleuritic pain   Cardiology:   chest pain, palpitations, orthopnea, PND, edema, syncope   Gastrointestinal:  abdominal pain , N/V, diarrhea, dysphagia, constipation, bleeding   Genitourinary:  frequency, urgency, dysuria, hematuria, incontinence   Muskuloskeletal :  arthralgia, myalgia, back pain  Hematology:  easy bruising, nose or gum bleeding, lymphadenopathy   Dermatological: rash, ulceration, pruritis, color change / jaundice  Endocrine:   hot flashes or polydipsia   Neurological:  headache, dizziness, confusion, focal weakness, paresthesia,     Speech difficulties, memory loss, gait difficulty  Psychological: Feelings of anxiety, depression, agitation    Objective:   VITALS:    Visit Vitals    /81    Pulse 91    Resp 26    Ht 5' 4\" (1.626 m)    Wt 71.4 kg (157 lb 6.5 oz)    SpO2 96%    BMI 27.02 kg/m2       PHYSICAL EXAM:    General:    Alert, cooperative, no distress, appears stated age. HEENT: Atraumatic, anicteric sclerae, pink conjunctivae     No tongue or lip swelling. Voice normal  Neck:  Supple, symmetrical,  thyroid: non tender  NO stridor  Lungs:   Clear to auscultation bilaterally. No Wheezing or Rhonchi. No rales. Chest wall:  No tenderness  No Accessory muscle use. Heart:   Regular  rhythm,   No edema  Abdomen:   Soft, non-tender. Not distended. Bowel sounds normal  Extremities: No cyanosis. No clubbing  Capillary refill normal,  Radial pulse 2+  Skin:     Not pale. Not Jaundiced  + diffuse erythema resolved (noted earlier)  Psych:  Good insight. Not depressed. Not anxious or agitated. Neurologic: EOMs intact. No facial asymmetry. No aphasia or slurred speech. Symmetrical strength, Sensation grossly intact.  Alert and oriented X 4.     _______________________________________________________________________  Care Plan discussed with:    Comments   Patient x    Family  x  liliane   RN     Care Manager                    Consultant:      _______________________________________________________________________  Expected  Disposition:   Home with Family x   HH/PT/OT/RN    SNF/LTC    MIKAELA    ________________________________________________________________________  TOTAL TIME:  61  Minutes    Critical Care Provided     Minutes non procedure based      Comments    x Reviewed previous records   >50% of visit spent in counseling and coordination of care  Discussion with patient and/or family and questions answered       Given the patient's current clinical presentation, I have a high level of concern for decompensation if discharged from the ED. Complex decision making was performed which includes reviewing the patient's available past medical records, laboratory results, and Xray films. I have also directly communicated my plan and discussed this case with the involved ED physician.     ____________________________________________________________________  Gabbi Blanton MD    Procedures: see electronic medical records for all procedures/Xrays and details which were not copied into this note but were reviewed prior to creation of Plan. LAB DATA REVIEWED:    Recent Results (from the past 24 hour(s))   CBC WITH AUTOMATED DIFF    Collection Time: 03/14/17  2:08 PM   Result Value Ref Range    WBC 10.2 3.6 - 11.0 K/uL    RBC 4.83 3.80 - 5.20 M/uL    HGB 14.6 11.5 - 16.0 g/dL    HCT 43.7 35.0 - 47.0 %    MCV 90.5 80.0 - 99.0 FL    MCH 30.2 26.0 - 34.0 PG    MCHC 33.4 30.0 - 36.5 g/dL    RDW 18.3 (H) 11.5 - 14.5 %    PLATELET 742 600 - 881 K/uL    NEUTROPHILS 55 32 - 75 %    LYMPHOCYTES 32 12 - 49 %    MONOCYTES 12 5 - 13 %    EOSINOPHILS 1 0 - 7 %    BASOPHILS 0 0 - 1 %    ABS. NEUTROPHILS 5.6 1.8 - 8.0 K/UL    ABS. LYMPHOCYTES 3.3 0.8 - 3.5 K/UL    ABS. MONOCYTES 1.2 (H) 0.0 - 1.0 K/UL    ABS. EOSINOPHILS 0.1 0.0 - 0.4 K/UL    ABS.  BASOPHILS 0.0 0.0 - 0.1 K/UL   METABOLIC PANEL, COMPREHENSIVE    Collection Time: 03/14/17  2:08 PM   Result Value Ref Range    Sodium 142 136 - 145 mmol/L    Potassium 3.6 3.5 - 5.1 mmol/L    Chloride 105 97 - 108 mmol/L    CO2 25 21 - 32 mmol/L    Anion gap 12 5 - 15 mmol/L    Glucose 155 (H) 65 - 100 mg/dL    BUN 8 6 - 20 MG/DL    Creatinine 0.75 0.55 - 1.02 MG/DL    BUN/Creatinine ratio 11 (L) 12 - 20      GFR est AA >60 >60 ml/min/1.73m2    GFR est non-AA >60 >60 ml/min/1.73m2    Calcium 9.5 8.5 - 10.1 MG/DL    Bilirubin, total 0.4 0.2 - 1.0 MG/DL    ALT (SGPT) 26 12 - 78 U/L    AST (SGOT) 27 15 - 37 U/L    Alk.  phosphatase 165 (H) 45 - 117 U/L    Protein, total 7.7 6.4 - 8.2 g/dL    Albumin 3.7 3.5 - 5.0 g/dL    Globulin 4.0 2.0 - 4.0 g/dL    A-G Ratio 0.9 (L) 1.1 - 2.2     TROPONIN I    Collection Time: 03/14/17  2:08 PM   Result Value Ref Range    Troponin-I, Qt. <0.04 <0.05 ng/mL   EKG, 12 LEAD, INITIAL    Collection Time: 03/14/17  4:10 PM   Result Value Ref Range    Ventricular Rate 98 BPM    Atrial Rate 98 BPM    P-R Interval 126 ms    QRS Duration 66 ms    Q-T Interval 348 ms    QTC Calculation (Bezet) 444 ms    Calculated P Axis 12 degrees    Calculated R Axis 24 degrees    Calculated T Axis 20 degrees    Diagnosis       Normal sinus rhythm  Normal ECG  When compared with ECG of 16-NOV-2016 09:12,  No significant change was found

## 2017-03-17 NOTE — PROGRESS NOTES
Pt's neulasta has been transferred from [facility] to their [facility] side of [facility]. The number to call to inquire about the status is [phone].

## 2017-03-22 NOTE — PROGRESS NOTES
Pt arrived to [facility] in no acute distress at 0915 for Folfox + Vectibix C3.  Assessment unremarkable except pt reports pain when urinating and was being treated for UTI when she had an allergic reaction to Augmentin and was admitted to the hospital. R chest port accessed without issue and positive blood return noted.  Labs obtained. Visit Vitals    /69 (BP 1 Location: Left arm, BP Patient Position: Sitting)    Pulse 90    Temp 97.1 °F (36.2 °C)    Resp 18    Ht 5' 5\" (1.651 m)    Wt 73.7 kg (162 lb 8 oz)    SpO2 96%    BMI 27.04 kg/m2     Recent Results (from the past 12 hour(s))   CBC WITH 3 PART DIFF    Collection Time: 03/22/17  9:40 AM   Result Value Ref Range    WBC 6.4 3.6 - 11.0 K/uL    RBC 4.47 3.80 - 5.20 M/uL    HGB 13.5 11.5 - 16.0 g/dL    HCT 40.8 35.0 - 47.0 %    MCV 91.3 80.0 - 99.0 FL    MCH 30.2 26.0 - 34.0 PG    MCHC 33.1 30.0 - 36.5 g/dL    RDW 18.9 (H) 11.8 - 15.8 %    PLATELET 885 829 - 597 K/uL    NEUTROPHILS 63 32 - 75 %    MIXED CELLS 12 3.2 - 16.9 %    LYMPHOCYTES 25 12 - 49 %    ABS. NEUTROPHILS 4.1 1.8 - 8.0 K/UL    ABS. MIXED CELLS 0.7 0.2 - 1.2 K/uL    ABS.  LYMPHOCYTES 1.6 0.8 - 3.5 K/UL    DF AUTOMATED     MAGNESIUM    Collection Time: 03/22/17  9:40 AM   Result Value Ref Range    Magnesium 2.1 1.6 - 2.4 mg/dL   POC CHEM8    Collection Time: 03/22/17  9:42 AM   Result Value Ref Range    Calcium, ionized (POC) 1.18 1.12 - 1.32 MMOL/L    Sodium (POC) 142 136 - 145 MMOL/L    Potassium (POC) 3.7 3.5 - 5.1 MMOL/L    Chloride (POC) 100 98 - 107 MMOL/L    CO2 (POC) 27 21 - 32 MMOL/L    Anion gap (POC) 19 (H) 5 - 15 mmol/L    Glucose (POC) 114 (H) 65 - 105 MG/DL    BUN (POC) 8 (L) 9 - 20 MG/DL    Creatinine (POC) 0.5 (L) 0.6 - 1.3 MG/DL    GFR-AA (POC) >60 >60 ml/min/1.73m2    GFR, non-AA (POC) >60 >60 ml/min/1.73m2    Hemoglobin (POC) 14.3 11.5 - 16.0 GM/DL    Hematocrit (POC) 42 35.0 - 47.0 %    Comment Notified RN or MD immediately by          The following medications administered:  D5 @ KVO  Aloxi 0.25 mg IVP  Decadron 12 mg IVP  Leucovorin 750 mg IV  Oxaliplatin 160 mg IV  NS @ KVO  5- mg IVP  Vectibix 460 mg IV  5-FU 4,510 mg IV via chemo pump set to run over 46 hrs    Visit Vitals    /86    Pulse (!) 104    Temp 97.1 °F (36.2 °C)    Resp 18    Ht 5' 5\" (1.651 m)    Wt 73.7 kg (162 lb 8 oz)    SpO2 96%    BMI 27.04 kg/m2       Pt tolerated treatment well. Pt discharged ambulatory in no acute distress at 1420, accompanied by family.  Next appointment 3/24/17 at 3:30 PM.

## 2017-03-24 NOTE — PROGRESS NOTES
OPIC short consult note:  5116 Pt arrived to [facility] ambulatory and in no distress for Pump Removal and Neulasta. Denies any new complaints. Port access intact. Pump d/c'd. Positive blood return noted. Port flushed and de-accessed. /70  Pulse 70  Temp 97.2 °F (36.2 °C)  Resp 18  SpO2 98%  Medication given:  Neulasta  1545 Discharged home ambulatory and in no distress. Tolerated procedure well. Next appointment 4/5.

## 2017-04-05 PROBLEM — J96.90 RESPIRATORY FAILURE (HCC): Status: ACTIVE | Noted: 2017-01-01

## 2017-04-05 NOTE — OP NOTES
OPERATIVE NOTE    Date of Procedure: 4/5/2017   Preoperative Diagnosis: Emergent Intabation Needed  Postoperative Diagnosis: Emergent Intabation Needed    Procedure(s):  EMERGENT CONTROLLED INTABATION  Surgeon(s) and Role:     * Lisa Kong MD - Primary        Patient brought to the or with biphasic stridor. Anesthesia underwent rapid induction protocol. Patient was intubated by anesthesia with glide scope. Vocal cords visualized- minimal edema left tvc, no significant swelling glottis, supraglottis, subglottis. tvc midline bilaterally. Patient intubated atraumatically by anesthesia, stable to pacu.     Surgical Staff:  Circ-1: Kiana Castillo RN  Scrub Tech-1: Lavelle Simple  Float Staff: Juliet Roe RN  Event Time In   Incision Start 1510   Incision Close 1515     Anesthesia: General   Estimated Blood Loss: 0cc  Specimens: * No specimens in log *   Findings: as above, tvc midline bilaterally   Complications: none  Implants: * No implants in log *

## 2017-04-05 NOTE — ED NOTES
Dr. Victor M Meredith at bedside to take patient to OR. Patient dressed and MD stated to take patient straight back to OR. Pt. Remains alert.

## 2017-04-05 NOTE — PERIOP NOTES
1600: Patient received to PACU. No orders in place. ED notified, hospitalist consulted, Dr. Maxcine Kawasaki at bedside to see patient.

## 2017-04-05 NOTE — PROGRESS NOTES
April Moise is a 70 y.o. female here for follow up had concerns including Follow-up and Colon Cancer. HIPAA verified by two patient identifiers. C/omild fatigue,left shoulder pain #5/10,hair loss, mild itching, moderate rash,urinary difficulty ,also taking antibiotic for UTI    Ms. Emelyn Charles has a reminder for a \"due or due soon\" health maintenance. I have asked that she contact her primary care provider for follow-up on this health maintenance.

## 2017-04-05 NOTE — PROGRESS NOTES
Patient: Zain Huffman MRN: [id number]  SSN: [id number]    YOB: 1945  Age: 70 y.o. Sex: female        Diagnosis:     1. Metastatic recurrent colon carcinoma, 3/2016   HOLLIS wild type, Her 2 mutated, MSI stable    2. Local recurrent of colon carcinoma, 01/2016   T4b N1c,Tumor deposits: Present, all 9 LN -ve    3. Colon adenocarcinoma:    T4a N0 M0 (Stage II); all 25 LN clear of disease       Treatment:      1. Systemic chemotherapy (started 4/13/2016)   FOLFIRI, cycle 4 day 1    Irinotecan reduced 25% d/t neutropenia (5/11/2016)   FOLFIRI + Avastin (started 6/8/2016), s/p 5 cycles    Irinotecan reduced 50% d/t neutropenia    5-FU, s/p 2 cycles (stopped 9/7/2016_              FOLFIRI + Avastin - s/p 4 cycles stopped 1/11/2016              FOLFOX + Panitumumab s/p 2 cycles     2. Laparoscopic assisted resection of abdominal mass with ileocolic and small bowel resection 01/21/2016  3. Adjuvant mFOLFOX    (Between 07/09 - 12/31/2014)  4. Partial colectomy 05/23/2013       Subjective:      Zain Huffman is a 79 y.o. female with metastatic colon adenocarcinoma. She was initially diagnosed with high risk Stage II disease and underwent a partial colectomy on 05/23/2013. All 25 LN were uninvolved with disease but the tumor penetrated the serosa into the pericolic fat and there was focal invasion in the appendix as well as the small bowel. Ms. Hillray Basurto completed 6 months of adjuvant mFOLFOX . She had recurrent disease in the abdomen around the previous site of disease. She underwent laparoscopic resection of the small bowel and colon in Jan 2016. The tumor was found to perforate the small bowel and there was some metastatic deposit. Ms. Hillary Basurto experienced abdominal pain and subsequent CT showed recurrence of disease in the liver, peritoneum, and ovaries. She resumed chemotherapy with FOLFIRI + Avastin. Repeat scans showed disease progression. She is receiving FOLFOX + Vectibix.     Ms. Hillary Basurto is currently on Doxycycline for a UTI. If this helps her facial rash she will restart the Doxycycline. Review of Systems:     Constitutional: fatigue  Eyes: negative   Ears, Nose, Mouth, Throat, and Face: negative   Respiratory: negative   Cardiovascular: negative   Gastrointestinal: RUQ pain is better  Integument/Breast: facial rash  Hematologic/Lymphatic: negative   Musculoskeletal:negative   Neurological: negative         Past Medical History:   Diagnosis Date    Anemia 5-3-13    Hgb 7.4         received transfuion of two units    Anemia NEC     Arthritis     mild- low back    Cancer (HCC)     colon cancer    GERD (gastroesophageal reflux disease)     at times    Liver disease     spots on liver    Neuropathy     Thyroid disease     nodules     Past Surgical History:   Procedure Laterality Date    ABDOMEN SURGERY PROC UNLISTED  16    lap resection of abdominal mass with ileocolic/small bowel resection.  COLONOSCOPY N/A 2016    COLONOS[facility] performed by Eloise Landau[facility] at [facility] ENDOSCOPY    HX CERVICAL FUSION      anterior cervical disc and fusion    HX  SECTION      HX GI  13    COLON RESECTION LAPAROSCOPIC RIGHT     HX VASCULAR ACCESS      Portacath      Family History   Problem Relation Age of Onset    Cancer Sister      colon    Colon Cancer Sister     Diabetes Mother     Heart Disease Father     Hypertension Father     Stroke Father     Diabetes Father     Colon Cancer Sister     Cancer Sister      lung     Social History   Substance Use Topics    Smoking status: Former Smoker     Packs/day: 1.00     Years: 20.00     Quit date: 1988    Smokeless tobacco: Never Used      Comment: quit 30 yrs ago    Alcohol use 7.0 oz/week     14 Cans of beer per week      Comment: couple beers/day      Prior to Admission medications    Medication Sig Start Date End Date Taking?  Authorizing Provider   fexofenadine-pseudoephedrine (ALLEGRA-D 12 HOUR)  mg Tb12 Take 1 Tab by mouth every twelve (12) hours. Yes Historical Provider   oxyCODONE IR (ROXICODONE) 5 mg immediate release tablet Take 1 Tab by mouth every four (4) hours as needed for Pain. Max Daily Amount: 30 mg. 11/23/16  Yes Moe Singh NP   acetaminophen (TYLENOL) 500 mg tablet Take  by mouth every six (6) hours as needed for Pain. Yes Historical Provider          Allergies   Allergen Reactions    Beef Containing Products Anaphylaxis    Heparin (Porcine) Hives     Heparin beef starts with hives and can lead to anaphylatic shock    Iodinated Contrast Media - Oral And Iv Dye Rash, Nausea and Vomiting and Other (comments)     Abdominal pain    Pork/Porcine Containing Products Anaphylaxis    Augmentin [Amoxicillin-Pot Clavulanate] Rash     Full body rash, NV    Bactrim [Sulfamethoprim Ds] Hives    Erythromycin Hives and Itching    Ciprofloxacin Unknown (comments)     Splitting headache    Macrobid [Nitrofurantoin Monohyd/M-Cryst] Other (comments)     Headaches, bad dreams           Objective:     Vitals:    04/05/17 0935   BP: 115/78   Pulse: 72   Resp: 18   Temp: 97.6 °F (36.4 °C)   SpO2: 97%   Weight: 157 lb (71.2 kg)   Height: 5' 5\" (1.651 m)          Physical Exam:    GENERAL: alert, cooperative   EYE: conjunctivae/corneas clear. LYMPHATIC: Cervical, supraclavicular, and axillary nodes normal.   THROAT & NECK: normal and no erythema or exudates noted. LUNG: clear to auscultation bilaterally   HEART: regular rate and rhythm   ABDOMEN: soft, non-tender. EXTREMITIES: extremities normal   SKIN: Normal.   NEUROLOGIC: negative         IMAGING:    CT Results (most recent):    Results from Hospital Encounter encounter on 01/23/17   CT ABD PELV W CONT   Narrative INDICATION: Follow-up metastatic colon cancer. COMPARISON: 11/16/2016.     TECHNIQUE:  Following the uneventful intravenous administration of 96 cc  Isovue-370, 5 mm axial images were obtained through the chest, abdomen, and  pelvis. Oral contrast was not administered. CT dose reduction was achieved  through use of a standardized protocol tailored for this examination and  automatic exposure control for dose modulation. FINDINGS:    THYROID: No nodule. MEDIASTINUM: No change in subcentimeter pericardiophrenic lymph nodes. Otherwise  unremarkable. KENDALL: No mass or lymphadenopathy. THORACIC AORTA: No dissection or aneurysm. MAIN PULMONARY ARTERY: Normal in caliber. TRACHEA/BRONCHI: Patent. ESOPHAGUS: No wall thickening or dilatation. HEART: Normal in size. PLEURA: No effusion or pneumothorax. LUNGS: No nodule, mass, or airspace disease. LIVER: Multiple mass lesions are redemonstrated. Subjectively there is overall  increase in tumor burden, however the largest lesion at the dome of the liver in  segment 8 has decreased in size from about 3.0 x 3.4 cm to 2.8 x 2.8 cm (series  2, image 47). A more inferior segment 8 lesion has increased in size from 1.9 x  2.5 cm to 2.3 x 2.8 cm (2, 51) and a segment 7 lesion has increased from 1.1 x  1.6 cm to 1.6 x 2.2 cm (2, 51). A caudate lobe lesion has increased from 1.5 x  1.8 cm to 1.8 x 2.3 cm (2, 51). GALLBLADDER: Collapsed and grossly unremarkable. .  SPLEEN: No mass. PANCREAS: No mass or ductal dilatation. ADRENALS: Unremarkable. KIDNEYS: Unremarkable. STOMACH: Small sliding hiatal hernia. Otherwise unremarkable. SMALL BOWEL: No dilatation or wall thickening. COLON: Status post partial colectomy with right-sided ileocolonic anastomosis  which appears as expected. APPENDIX: Surgically absent. PERITONEUM: Redemonstration of omental caking and intraperitoneal nodularity. Anterior midline peritoneal implant has increased from 1.1 x 1.3 cm to 1.3 x 1.5  cm (2, 96). Nodule in the rectouterine space is increased from approximately 1.2  x 1.6 cm to 1.4 x 1.9 cm (2, 103).  Presacral nodules appear grossly stable  measuring 11 x 12 mm and 10 x 12 mm (2, 95 and 96)  RETROPERITONEUM: No lymphadenopathy or aortic aneurysm. REPRODUCTIVE ORGANS: Uterus appears unremarkable. Right ovary appears more  normal measuring 2.4 x 3.7 cm as compared to prior exam measurements of 3.3 x  4.6 cm. Left ovary grossly unremarkable. URINARY BLADDER: No mass or calculus. BONES: Degenerative hip and spine change with no aggressive lesions. ADDITIONAL COMMENTS: Right Port-A-Cath in position with tip of catheter in mid  superior vena cava. Impression IMPRESSION: Overall there is been progression of disease with increase in size  of multiple hepatic metastases and intraperitoneal carcinomatosis with increased  size of peritoneal implants. Previously seen abnormal appearance of the right  ovary is improved and may reflect nonmetastatic process. No new sites of  metastatic disease identified. NOTE: Despite premedication, the patient had an allergic reaction to contrast  administration. I examined the patient in radiology holding with note of diffuse  skin flushing, conjunctiva congestion, and tachycardia with heart rate of 97  bpm. No wheezing or shortness of breath. LABS:  Lab Results   Component Value Date/Time    WBC 7.7 04/05/2017 09:25 AM    Hemoglobin (POC) 13.9 04/05/2017 09:27 AM    HGB 12.9 04/05/2017 09:25 AM    Hematocrit (POC) 41 04/05/2017 09:27 AM    HCT 38.9 04/05/2017 09:25 AM    PLATELET 255 42/12/8597 09:25 AM    MCV 92.2 04/05/2017 09:25 AM         Assessment:      1. Metastatic recurrent colon carcinoma, 3/2016   HOLLIS wild type, Her 2 mutated, MSI stable    2. Local recurrent of colon carcinoma, 01/2016   T4b N1c,Tumor deposits: Present, all 9 LN -ve    3.  Initially diagnosed Colon adenocarcinoma: 05/2013  T4a N0 M0 (Stage II); all 25 LN clear of disease   High risk features - T4a, serosal penetration, involvement of adjacent organs - small bowel and appendix, moderate to poor differentiation     ECOG PS 0  Intent of treatment - palliative    Systemic chemotherapy   FOLFIRI, s/p 3 cycles    Irinotecan reduced 25% d/t neutropenia (5/11/2016)   FOLFIRI + Avastin s/p 8 Cycles     Irinotecan reduced 50% d/t neutropenia (7/6/2016)   5-FU s/p 2 cycles    FOLFIRI + Avastin - s/p 4 Cycles - stopped d/t disease progression    CT Chest, Abd, Pelv (1/23/2017) - disease progression in the liver and intraperitoneal carcinomatosis    Receiving FOLFOX + Panitumumab  Cycle 4 day 1  Tolerating treatment very well    Cold sensitivity in hands - Oxaliplatin related. ? Rash on the face - Vectibix related. Using lotions and add Doxycycline. A detailed system by system evaluation of side effect was performed to assess chemotherapy related toxicity. Blood counts are acceptable. Results reviewed with the patient  Symptom management form reviewed with patient. 2. Right sided abdominal pain - resolved        3. Facial Rash    From Vectibix    > Using facial lotion  > Doxycycline      4. Hypokalemia    > 20 meq IV in [facility]  > 40 meq PO now  > 40 meq po daily rx sent to pharmacy       5. UTI    > currently on doxycycline - if this helps her Vectibix related rash, I will continue it beyond the one week course        Plan:        > neulasta on Friday  > Encourage use of hand and foot creams  > Will repeat a MRI of the Abdomen next week  > Doxycycline for UTI  > Magnesium 2 g IV in infusion center today  > 20 meq IV in [facility]  > 40 meq PO now  > 40 meq po daily rx sent to pharmacy   > Follow-up in 2 weeks        Signed by: Sallie Stanton MD                     April 5, 2017          CC. Brayan Redman MD   CC. Ruma Rivera MD   CC.  Ambreen Joseph MD

## 2017-04-05 NOTE — PERIOP NOTES
Patient has dentures, they were taken out in the OR and placed in a container which were transferred with her to PACU.

## 2017-04-05 NOTE — PERIOP NOTES
Handoff Report from Operating Room to PACU    Report received from Jeremy Cain RN and STEVIE Nascimento CRNA regarding Anjelica Chung. Surgeon(s):  Cynthia Chavez MD  And Procedure(s) (LRB):  EMERGENT CONTROLLED INTABATION (N/A)  confirmed   with allergies discussed. Anesthesia type, drugs, patient history, complications, estimated blood loss, vital signs, intake and output, and last pain medication, lines and temperature were reviewed.

## 2017-04-05 NOTE — PROGRESS NOTES
Pt arrived to [facility] ambulatory in no acute distress at 0910 for Folfox + Vectibix C4.  Assessment unremarkable except pt has a rash to face mostly over cheeks, under eyes, and on forehead. Pt also is on Doxycycline for UTI. R chest port accessed without issue and positive blood return noted.  Labs obtained- CBCap, Istat, Mag. Pt over to MD office for follow up appt. Orders received to give pt IV and PO potassium & IV magnesium. Prescription called into pt's pharmacy for PO potassium. Visit Vitals    /71 (BP 1 Location: Left arm, BP Patient Position: Sitting)    Pulse 78    Temp 97.8 °F (36.6 °C)    Resp 18    Wt 71.7 kg (158 lb)    SpO2 98%    BMI 26.29 kg/m2     Recent Results (from the past 12 hour(s))   CBC WITH 3 PART DIFF    Collection Time: 04/05/17  9:25 AM   Result Value Ref Range    WBC 7.7 3.6 - 11.0 K/uL    RBC 4.22 3.80 - 5.20 M/uL    HGB 12.9 11.5 - 16.0 g/dL    HCT 38.9 35.0 - 47.0 %    MCV 92.2 80.0 - 99.0 FL    MCH 30.6 26.0 - 34.0 PG    MCHC 33.2 30.0 - 36.5 g/dL    RDW 17.7 (H) 11.8 - 15.8 %    PLATELET 582 (L) 199 - 400 K/uL    NEUTROPHILS 64 32 - 75 %    MIXED CELLS 11 3.2 - 16.9 %    LYMPHOCYTES 25 12 - 49 %    ABS. NEUTROPHILS 5.0 1.8 - 8.0 K/UL    ABS. MIXED CELLS 0.8 0.2 - 1.2 K/uL    ABS.  LYMPHOCYTES 1.9 0.8 - 3.5 K/UL    DF AUTOMATED     MAGNESIUM    Collection Time: 04/05/17  9:25 AM   Result Value Ref Range    Magnesium 1.7 1.6 - 2.4 mg/dL   POTASSIUM    Collection Time: 04/05/17  9:25 AM   Result Value Ref Range    Potassium 2.8 (L) 3.5 - 5.1 mmol/L   POC CHEM8    Collection Time: 04/05/17  9:27 AM   Result Value Ref Range    Calcium, ionized (POC) 1.08 (L) 1.12 - 1.32 MMOL/L    Sodium (POC) 143 136 - 145 MMOL/L    Potassium (POC) 2.7 (LL) 3.5 - 5.1 MMOL/L    Chloride (POC) 98 98 - 107 MMOL/L    CO2 (POC) 29 21 - 32 MMOL/L    Anion gap (POC) 19 (H) 5 - 15 mmol/L    Glucose (POC) 115 (H) 65 - 100 MG/DL    BUN (POC) <3 (L) 9 - 20 MG/DL    Creatinine (POC) 0.4 (L) 0.6 - 1.3 MG/DL    GFR-AA (POC) >60 >60 ml/min/1.73m2    GFR, non-AA (POC) >60 >60 ml/min/1.73m2    Hemoglobin (POC) 13.9 11.5 - 16.0 GM/DL    Hematocrit (POC) 41 35.0 - 47.0 %    Comment Comment Not Indicated. The following medications administered:  D5 @ KVO  Aloxi 0.25 mg IVP  Decadron 12 mg IVP  Leucovorin 750 mg IV  Oxaliplatin 160 mg IV  Potassium 40 mEq PO  NS @ 999 mL/hr    1412- Normal saline running at 100 mL/hr. Pt complaining of pain when breathing and wheezing while gasping for air. Pt in respiratory distress-- 6L O2 placed via nonrebreather mask and oxygen sats at 100%. Severo Batty, RN, NEHEMIAH Spaulding, and Omayra Rizzo RN at bedside to assist. NS increased to 999 ml/hr. 1414- Solumedrol 125 mg IVP given  1416- Benadryl 50 mg IVP given  1418- Epinephrine 1 mg IM injection given to R deltoid by GALDINO Hobson, [address] [place] [address] Isaias Barreto, EVELYN at chairside. No improvement from rescue meds--O2 increased to 10L. Pt unable to verbalize and still wheezing. Decision made to transfer patient down to ED with O2 via wheelchair for sake of time with NP, 2 RN's, PCT, and pt's family member.     Patient Vitals for the past 8 hrs:   Temp Pulse Resp BP SpO2   04/05/17 1418 - (!) 105 22 (!) 180/103 92 %   04/05/17 1417 - (!) 108 20 (!) 160/94 100 %   04/05/17 1413 - 99 - 138/82 100 %   04/05/17 0910 97.8 °F (36.6 °C) 78 18 108/71 98 %

## 2017-04-05 NOTE — BRIEF OP NOTE
BRIEF OPERATIVE NOTE    Date of Procedure: 4/5/2017   Preoperative Diagnosis: Emergent Intabation Needed  Postoperative Diagnosis: Emergent Intabation Needed    Procedure(s):  EMERGENT CONTROLLED INTABATION  Surgeon(s) and Role:     * Roshni Avila MD - Primary        Patient brought to the or with biphasic stridor. Anesthesia underwent rapid induction protocol. Patient was intubated by anesthesia with glide scope. Vocal cords visualized- minimal edema left tvc, no significant swelling glottis, supraglottis, subglottis. tvc midline bilaterally. Patient intubated atraumatically by anesthesia, stable to pacu.     Surgical Staff:  Circ-1: René Ralph RN  Scrub Tech-1: Alexander [facility]  Float Staff: Jose Muñoz RN  Event Time In   Incision Start 1510   Incision Close 1515     Anesthesia: General   Estimated Blood Loss: 0cc  Specimens: * No specimens in log *   Findings: as above, tvc midline bilaterally   Complications: none  Implants: * No implants in log *

## 2017-04-05 NOTE — ANESTHESIA PREPROCEDURE EVALUATION
Anesthetic History     PONV          Review of Systems / Medical History  Patient summary reviewed, nursing notes reviewed and pertinent labs reviewed    Pulmonary        Sleep apnea           Neuro/Psych             Comments: Neuropathy  Cardiovascular  Within defined limits                Exercise tolerance: >4 METS     GI/Hepatic/Renal     GERD      Liver disease    Comments: Colon ca  Endo/Other      Hypothyroidism  Arthritis and anemia     Other Findings   Comments: Thyroid nodules         Physical Exam    Airway  Mallampati: I    Neck ROM: normal range of motion   Mouth opening: Normal     Cardiovascular  Regular rate and rhythm,  S1 and S2 normal,  no murmur, click, rub, or gallop             Dental    Dentition: Full upper dentures and Poor dentition     Pulmonary      Decreased breath sounds: bilateral  Wheezes:bilateral         Abdominal  GI exam deferred       Other Findings            Anesthetic Plan    ASA: 3, emergent  Anesthesia type: general          Induction: Intravenous  Anesthetic plan and risks discussed with: Patient

## 2017-04-05 NOTE — ED NOTES
Pt. Presents to ED today from the infusion center where she was receiving chemotherapy for complaints of SOB and anaphylaxis. Pt. Presented to ED on non-rebreather accompanied by family and onc NP and 2 RN. Pt. Alert and oriented. Pt. Bernadine Larson on monitor x3.

## 2017-04-05 NOTE — PROGRESS NOTES
[address] [facility], NP called to come see pt  1416 started o2 for difficulty breathing  1418 Call placed to 911.  1420 Left [facility] with Pt on O2 and in wheelchair to ED

## 2017-04-05 NOTE — PROGRESS NOTES
[facility]  Pulmonary, Critical Care, and Sleep Medicine    Name: Stephany Lozano MRN: [id number]   : 1945 Hospital: [facility]   Date: 2017        IMPRESSION:   · Acute respiratory failure - airway protection  · Stridor - possibly due to vocal cord paralysis - no edema seen at time of intubation  · Metastatic colon cancer  · Facial rash  · Hypokalemia       PLAN:   · Ventilator support   · Follow up ABG and chest X-ray   · Replete electrolytes  · ENT evaluation ongoing  · MRI to be done - ?metastases  · Steroids   · Hold doxycycline in case this was an allergic reaction (though seems less likely at this point)  · PPI  · DVT prophylaxis with SCDs only (heparin allergy)     Subjective/Interval History:   I have reviewed the flowsheet and previous days notes. The patient is unable to give any meaningful history or review of systems because the patient is:   Intubated/sedated - presented today with stridor - metastatic colon cancer; was started on doxycycline for facial rash yesterday - intubated in OR, but no edema seen, possibly paralyzed vocal cord     The patient is critically ill on:      Mechanical ventilation     Review of Systems   Unable to perform ROS: Intubated     Objective:   Vital Signs:    Visit Vitals    /73    Pulse (!) 103    Temp 98.4 °F (36.9 °C)    Resp 18    SpO2 100%       O2 Device: Other (comment) (intubated)   O2 Flow Rate (L/min): 10 l/min   Temp (24hrs), Av.9 °F (36.6 °C), Min:97.6 °F (36.4 °C), Max:98.4 °F (36.9 °C)       Intake/Output:   Last shift:      701 -  190  In: 650 [I.V.:650]  Out: -   Last 3 shifts:      Intake/Output Summary (Last 24 hours) at 17 1659  Last data filed at 17 1544   Gross per 24 hour   Intake              650 ml   Output                0 ml   Net              650 ml     Hemodynamics:   PAP:   CO:     Wedge:   CI:     CVP:    SVR:       PVR:       Ventilator Settings:  Mode Rate Tidal Volume Pressure FiO2 PEEP   Assist control   550 ml    60 % 5 cm H20     Peak airway pressure: 23 cm H2O    Minute ventilation: 6.8 l/min       Physical Exam   Constitutional: No distress. She is sedated and intubated. HENT:   Head: Normocephalic and atraumatic. Mouth/Throat: No oropharyngeal exudate. Eyes: No scleral icterus. Cardiovascular: Normal rate and regular rhythm. No murmur heard. Pulmonary/Chest: She is intubated. No respiratory distress. She has no wheezes. Abdominal: Soft. Bowel sounds are normal. She exhibits no distension. There is no tenderness. Musculoskeletal: She exhibits no edema. Skin: Skin is warm and dry. Rash (on face) noted. Data:     Current Facility-Administered Medications   Medication Dose Route Frequency    sodium chloride 0.9 % nebu        racEPINEPHrine (VAPONEFRIN) 2.25 % nebulizer solution        propofol (DIPRIVAN) 10 mg/mL injection        potassium chloride 10 mEq in 100 ml IVPB  10 mEq IntraVENous Q1H    sodium chloride (NS) 0.9 % flush        methylPREDNISolone (PF) (SOLU-MEDROL) injection 40 mg  40 mg IntraVENous Q8H                Labs:  Recent Labs      04/05/17   1456  04/05/17   0925   WBC  12.9*  7.7   HGB  14.2  12.9   HCT  42.5  38.9   PLT  168  147*     Recent Labs      04/05/17   1456  04/05/17   0925   NA  142   --    K  2.7*  2.8*   CL  102   --    CO2  26   --    GLU  160*   --    BUN  3*   --    CREA  0.65   --    CA  9.2   --    MG   --   1.7   ALB  3.4*   --    TBILI  0.5   --    SGOT  62*   --    ALT  44   --    INR  1.2*   --      No results for input(s): PH, PCO2, PO2, HCO3, FIO2 in the last 72 hours.     Imaging:  I have personally reviewed the patients radiographs and have reviewed the reports:  Pending         Total critical care time exclusive of procedures: 35 minutes  Niall Sommers MD

## 2017-04-05 NOTE — PROGRESS NOTES
Brief Note      Ms. Clifton Menchaca is a lady with metastatic colon carcinoma. She was seen in the office for systemic chemotherapy. In the midst of her infusion, she suffered severe adverse event where she started having respiratory difficult. She was wheeled to the ER, then intubated in the Operating room emergently. On discussions with Dr. Mosher Friday, she was noted to have vocal cord paralysis at the time of endotracheal intubation. 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 My expectation is that she will recover completely from this episode in the next 24-48 hours.          Signed by: Janneth Christian MD                     April 5, 2017

## 2017-04-05 NOTE — H&P
Hospitalist Admission Note    NAME: Kennedi Benitez   :     MRN:  [id number]     Date/Time:  2017 4:31 PM    Patient PCP: Ayush York MD  ________________________________________________________________________    My assessment of this patient's clinical condition and my plan of care is as follows. Assessment / Plan:  Acute respiratory failure  Secondary to  Chemo related neurotoxicity (oxaliplatin)  R/o allergic reaction causing anaphylaxis  No edema seen at intubation, possible vocal cord paralysis      S/p emergent intubation , transfer to ICU  Solumedrol  [facility]  [facility]  MRI brain ordered by ENT for further work up      Metastatic colon cancer, history of surgeries on  chemo  Follows with dr. Isabel Sanderson  Was in the infusion centre getting chemo    Per the last note  Cold sensitivity in hands oxaliplatin related  ? Rash on the face vectibix related -- added doxy yesterday ( was also to cover uti? ??)  Oncology follow up, doxy on hold per intensivist    Will check cultures and repeat ua chest xray at this time      Hypokalemia: was replaced in the infusion centre,  Give one more dose of iv  Check labs in am      Code Status: full  Surrogate Decision Maker:    DVT Prophylaxis:scds ( need to confirm allergies to heparin)  GI Prophylaxis: not indicated    Baseline: independent    Spoke with the daughter and updated        Subjective:   CHIEF COMPLAINT:emergent intubation     HISTORY OF PRESENT ILLNESS:     Gilford Florence is a 70 y. o.female who with history of metastatic colon adenocarcinoma  On chemo per dr. Isabel Sanderson  Was in infusion center today, while getting infusion  Pt developed sob and anaphyalxis, with emergent transfer to ER  Then to OR emergent intubation, in the interim got decadron and epi and benadryl    Pt intubated could not provide the history  Spoke with dr. Isabel Sanderson and dr. Arnoldo Pendleton to get the history        We were asked to admit for work up and evaluation of the above problems. Past Medical History:   Diagnosis Date    Anemia 5-3-13    Hgb 7.4         received transfuion of two units    Anemia NEC     Arthritis     mild- low back    Cancer (HCC)     colon cancer    GERD (gastroesophageal reflux disease)     at times    Liver disease     spots on liver    Neuropathy     Thyroid disease     nodules        Past Surgical History:   Procedure Laterality Date    ABDOMEN SURGERY PROC UNLISTED  16    lap resection of abdominal mass with ileocolic/small bowel resection.     COLONOSCOPY N/A 2016    COLONOSCOPY performed by [facility]ait[facility] Feliz [facility] at [facility] ENDOSCOPY    HX CERVICAL FUSION      anterior cervical disc and fusion    HX  SECTION      HX GI  13    COLON RESECTION LAPAROSCOPIC RIGHT     HX VASCULAR ACCESS      Portacath       Social History   Substance Use Topics    Smoking status: Former Smoker     Packs/day: 1.00     Years: 20.00     Quit date: 1988    Smokeless tobacco: Never Used      Comment: quit 30 yrs ago    Alcohol use 7.0 oz/week     14 Cans of beer per week      Comment: couple beers/day        Family History   Problem Relation Age of Onset    Cancer Sister      colon    Colon Cancer Sister     Diabetes Mother     Heart Disease Father     Hypertension Father     Stroke Father     Diabetes Father     Colon Cancer Sister     Cancer Sister      lung     Allergies   Allergen Reactions    Beef Containing Products Anaphylaxis    Heparin (Porcine) Hives     Heparin beef starts with hives and can lead to anaphylatic shock    Iodinated Contrast Media - Oral And Iv Dye Rash, Nausea and Vomiting and Other (comments)     Abdominal pain    Pork/Porcine Containing Products Anaphylaxis    Augmentin [Amoxicillin-Pot Clavulanate] Rash     Full body rash, NV    Bactrim [Sulfamethoprim Ds] Hives    Erythromycin Hives and Itching    Ciprofloxacin Unknown (comments)     Splitting headache    Macrobid [Nitrofurantoin Monohyd/M-Cryst] Other (comments)     Headaches, bad dreams        Prior to Admission medications    Medication Sig Start Date End Date Taking? Authorizing Provider   potassium chloride (K-DUR, KLOR-CON) 20 mEq tablet Take 1 Tab by mouth two (2) times a day. 4/5/17   Taty Ca NP   fexofenadine-pseudoephedrine (ALLEGRA-D 12 HOUR)  mg Tb12 Take 1 Tab by mouth every twelve (12) hours. Historical Provider   oxyCODONE IR (ROXICODONE) 5 mg immediate release tablet Take 1 Tab by mouth every four (4) hours as needed for Pain. Max Daily Amount: 30 mg. 11/23/16   Taty Ca NP   acetaminophen (TYLENOL) 500 mg tablet Take  by mouth every six (6) hours as needed for Pain. Historical Provider       REVIEW OF SYSTEMS:     I am not able to complete the review of systems because:  y The patient is intubated and sedated    The patient has altered mental status due to his acute medical problems    The patient has baseline aphasia from prior stroke(s)    The patient has baseline dementia and is not reliable historian    The patient is in acute medical distress and unable to provide information               Objective:   VITALS:    Visit Vitals    /54    Pulse 100    Temp 98.4 °F (36.9 °C)    Resp 12    SpO2 100%       PHYSICAL EXAM:    General:    intubated  HEENT: Atraumatic, anicteric sclerae, pink conjunctivae     No oral ulcers, mucosa moist, throat clear, dentition fair  Neck:  Supple, symmetrical,  thyroid: non tender  Lungs:   B/l decreased air entry. No rales. Chest wall:  No tenderness  No Accessory muscle use. Heart:   Regular  rhythm,  No  murmur   No edema  Abdomen:   Soft, non-tender. Not distended. Bowel sounds normal  Extremities: No cyanosis. No clubbing    Skin:     Not pale.   Not Jaundiced  No rashes   Psych:  Cannot be assessed  Neurologic: Intubated and sedated.     _______________________________________________________________________  Care Plan discussed with:    Comments   Patient     Family  y daughter   RN y    Care Manager                    Consultant:      _______________________________________________________________________  Expected  Disposition:   Home with Family    HH/PT/OT/RN y   SNF/LTC    MIKAELA    ________________________________________________________________________  TOTAL TIME:  72 Minutes    Critical Care Provided     Minutes non procedure based      Comments    y Reviewed previous records   >50% of visit spent in counseling and coordination of care y Discussion with patient and/or family and questions answered       ________________________________________________________________________  Signed: Cayden Frazier MD    Procedures: see electronic medical records for all procedures/Xrays and details which were not copied into this note but were reviewed prior to creation of Plan. LAB DATA REVIEWED:    Recent Results (from the past 24 hour(s))   CBC WITH 3 PART DIFF    Collection Time: 04/05/17  9:25 AM   Result Value Ref Range    WBC 7.7 3.6 - 11.0 K/uL    RBC 4.22 3.80 - 5.20 M/uL    HGB 12.9 11.5 - 16.0 g/dL    HCT 38.9 35.0 - 47.0 %    MCV 92.2 80.0 - 99.0 FL    MCH 30.6 26.0 - 34.0 PG    MCHC 33.2 30.0 - 36.5 g/dL    RDW 17.7 (H) 11.8 - 15.8 %    PLATELET 283 (L) 050 - 400 K/uL    NEUTROPHILS 64 32 - 75 %    MIXED CELLS 11 3.2 - 16.9 %    LYMPHOCYTES 25 12 - 49 %    ABS. NEUTROPHILS 5.0 1.8 - 8.0 K/UL    ABS. MIXED CELLS 0.8 0.2 - 1.2 K/uL    ABS.  LYMPHOCYTES 1.9 0.8 - 3.5 K/UL    DF AUTOMATED     MAGNESIUM    Collection Time: 04/05/17  9:25 AM   Result Value Ref Range    Magnesium 1.7 1.6 - 2.4 mg/dL   POTASSIUM    Collection Time: 04/05/17  9:25 AM   Result Value Ref Range    Potassium 2.8 (L) 3.5 - 5.1 mmol/L   POC CHEM8    Collection Time: 04/05/17  9:27 AM   Result Value Ref Range    Calcium, ionized (POC) 1.08 (L) 1.12 - 1.32 MMOL/L    Sodium (POC) 143 136 - 145 MMOL/L    Potassium (POC) 2.7 (LL) 3.5 - 5.1 MMOL/L Chloride (POC) 98 98 - 107 MMOL/L    CO2 (POC) 29 21 - 32 MMOL/L    Anion gap (POC) 19 (H) 5 - 15 mmol/L    Glucose (POC) 115 (H) 65 - 100 MG/DL    BUN (POC) <3 (L) 9 - 20 MG/DL    Creatinine (POC) 0.4 (L) 0.6 - 1.3 MG/DL    GFR-AA (POC) >60 >60 ml/min/1.73m2    GFR, non-AA (POC) >60 >60 ml/min/1.73m2    Hemoglobin (POC) 13.9 11.5 - 16.0 GM/DL    Hematocrit (POC) 41 35.0 - 47.0 %    Comment Comment Not Indicated. CBC WITH AUTOMATED DIFF    Collection Time: 04/05/17  2:56 PM   Result Value Ref Range    WBC 12.9 (H) 3.6 - 11.0 K/uL    RBC 4.61 3.80 - 5.20 M/uL    HGB 14.2 11.5 - 16.0 g/dL    HCT 42.5 35.0 - 47.0 %    MCV 92.2 80.0 - 99.0 FL    MCH 30.8 26.0 - 34.0 PG    MCHC 33.4 30.0 - 36.5 g/dL    RDW 17.0 (H) 11.5 - 14.5 %    PLATELET 649 387 - 787 K/uL    NEUTROPHILS 74 %    BAND NEUTROPHILS 10 %    LYMPHOCYTES 13 %    MONOCYTES 3 %    EOSINOPHILS 0 %    BASOPHILS 0 %    ABS. NEUTROPHILS 10.8 K/UL    ABS. LYMPHOCYTES 1.7 K/UL    ABS. MONOCYTES 0.4 K/UL    ABS. EOSINOPHILS 0.0 K/UL    ABS. BASOPHILS 0.0 K/UL    RBC COMMENTS ANISOCYTOSIS  1+        WBC COMMENTS TOXIC GRANULATION      DF MANUAL     METABOLIC PANEL, COMPREHENSIVE    Collection Time: 04/05/17  2:56 PM   Result Value Ref Range    Sodium 142 136 - 145 mmol/L    Potassium 2.7 (LL) 3.5 - 5.1 mmol/L    Chloride 102 97 - 108 mmol/L    CO2 26 21 - 32 mmol/L    Anion gap 14 5 - 15 mmol/L    Glucose 160 (H) 65 - 100 mg/dL    BUN 3 (L) 6 - 20 MG/DL    Creatinine 0.65 0.55 - 1.02 MG/DL    BUN/Creatinine ratio 5 (L) 12 - 20      GFR est AA >60 >60 ml/min/1.73m2    GFR est non-AA >60 >60 ml/min/1.73m2    Calcium 9.2 8.5 - 10.1 MG/DL    Bilirubin, total 0.5 0.2 - 1.0 MG/DL    ALT (SGPT) 44 12 - 78 U/L    AST (SGOT) 62 (H) 15 - 37 U/L    Alk.  phosphatase 423 (H) 45 - 117 U/L    Protein, total 7.7 6.4 - 8.2 g/dL    Albumin 3.4 (L) 3.5 - 5.0 g/dL    Globulin 4.3 (H) 2.0 - 4.0 g/dL    A-G Ratio 0.8 (L) 1.1 - 2.2     CK W/ REFLX CKMB    Collection Time: 04/05/17 2:56 PM   Result Value Ref Range    CK 59 26 - 192 U/L   TROPONIN I    Collection Time: 04/05/17  2:56 PM   Result Value Ref Range    Troponin-I, Qt. 0.04 <0.05 ng/mL   PROTHROMBIN TIME + INR    Collection Time: 04/05/17  2:56 PM   Result Value Ref Range    INR 1.2 (H) 0.9 - 1.1      Prothrombin time 12.0 (H) 9.0 - 11.1 sec   TYPE & SCREEN    Collection Time: 04/05/17  2:56 PM   Result Value Ref Range    Crossmatch Expiration 04/08/2017     ABO/Rh(D) A POSITIVE     Antibody screen NEG    NUCLEATED RBC    Collection Time: 04/05/17  2:56 PM   Result Value Ref Range    NRBC 0.0 0  WBC    ABSOLUTE NRBC 0.00 0.00 - 0.01 K/uL

## 2017-04-05 NOTE — ED PROVIDER NOTES
HPI Comments: Jake Garcia is a 70 y.o. female with a hx of colon cancer and thyroid disease presenting to the ED from the infusion center with her daughter and nurse for an allergic reaction. Per daughter, pt was receiving her 2nd chemotherapy treatment when her hands began to feel weak and she became significantly SOB, which has been progressively worsening. Infusion nurse gave her 1mg of epinephrine, Solumedrol, and Benadryl after the symptoms developed with no improvement. Daughter does report she had another allergic reaction 2 weeks ago after starting Augmentin and was started on Doxycycline yesterday. History is limited as pt is in severe respiratory distress. PCP: Eusebio Franco MD    Written by Aminta Egan ED Scribmagan, as dictated by Dnaiela Robison MD      The history is provided by a relative. The history is limited by the condition of the patient. No  was used. Past Medical History:   Diagnosis Date    Anemia 5-3-13    Hgb 7.4         received transfuion of two units    Anemia NEC     Arthritis     mild- low back    Cancer (HCC)     colon cancer    GERD (gastroesophageal reflux disease)     at times    Liver disease     spots on liver    Neuropathy     Thyroid disease     nodules       Past Surgical History:   Procedure Laterality Date    ABDOMEN SURGERY PROC UNLISTED  16    lap resection of abdominal mass with ileocolic/small bowel resection.     COLONOSCOPY N/A 2016    [facility] ENDOSCOPY    HX CERVICAL FUSION      anterior cervical disc and fusion    HX  SECTION      HX GI  13    COLON RESECTION LAPAROSCOPIC RIGHT     HX VASCULAR ACCESS      Portacath         Family History:   Problem Relation Age of Onset   Shaista Eisenberg Cancer Sister      colon    Colon Cancer Sister     Diabetes Mother     Heart Disease Father     Hypertension Father     Stroke Father     Diabetes Father    Shaistasai Ernstbaum Colon Cancer Sister     Cancer Sister      lung       Social History     Social History    Marital status: LEGALLY      Spouse name: N/A    Number of children: N/A    Years of education: N/A     Occupational History    Not on file. Social History Main Topics    Smoking status: Former Smoker     Packs/day: 1.00     Years: 20.00     Quit date: 6/14/1988    Smokeless tobacco: Never Used      Comment: quit 30 yrs ago    Alcohol use 7.0 oz/week     14 Cans of beer per week      Comment: couple beers/day    Drug use: No    Sexual activity: Not on file     Other Topics Concern    Not on file     Social History Narrative         ALLERGIES: Beef containing products; Heparin (porcine); Iodinated contrast media - oral and iv dye; Pork/porcine containing products; Augmentin [amoxicillin-pot clavulanate]; Bactrim [sulfamethoprim ds]; Erythromycin; Ciprofloxacin; and Macrobid [nitrofurantoin monohyd/m-cryst]    Review of Systems   Unable to perform ROS: Severe respiratory distress       Vitals:    04/05/17 1830 04/05/17 1845 04/05/17 1900 04/05/17 1915   BP: 128/65 124/62 122/70 124/69   Pulse: 100 97 98 97   Resp: 12 12 12 12   Temp:       SpO2: 100% 100% 99% 99%            Physical Exam     Nursing note and vitals reviewed.   General appearance: moderate to severe respiratory distress, pt unable to phonate, sits upright   Eyes: anicteric sclera  HEENT: mucous membranes moist, oropharynx w/o obvious edema  Neck: severe respiratory stridor (inspiratory)  Pulmonary:  significant accessory muscle use, transmitted inspiratory stridor, no wheeze  Cardiac: tachycardic and regular rhythm, no murmurs, gallops, or rubs, 2+ peripheral pulses, CR 3-4 seconds  Abdomen: soft, nontender, nondistended, bowel sounds present  MSK: no peripheral edema  Neuro: awake, sitting upright, unable to speak to answer questions, grossly moves all extremities  Skin: no urticaria or hives, no petechiae, cap refill 3-4 sec    MDM  Number of Diagnoses or Management Options  Allergic reaction, initial encounter: Inspiratory stridor:   Diagnosis management comments: DDx: subglottal angioedema, allergic reaction, vocal cord dysfunction    Suspect allergic reaction, although OP appears normal. Highly concerned for subglottic airway obstruction & need for urgent intubation. Multiple racemic epinephrine nebs given. To OR for intubation. Amount and/or Complexity of Data Reviewed  Clinical lab tests: ordered and reviewed  Obtain history from someone other than the patient: yes (Daughter  Nurse)  Review and summarize past medical records: yes  Discuss the patient with other providers: yes (ENT)    Critical Care  Total time providing critical care: 30-74 minutes      Procedures    CONSULT NOTE:  2:37 PM  Porfirio Millan. Patricia Ballesteros MD spoke with Connie Silverio MD,  Specialty: ENT  Discussed patient's hx, disposition, and available diagnostic and imaging results. Reviewed care plans. Consultant agrees with plans as outlined. Dr. Sarah Israel states pt should be moved to the OR for an emergent controlled intubation. PROGRESS NOTE:  2:47 PM  OR has been notified, Dr. Sarah Israel is en route to see pt. PROGRESS NOTE:  2:54 PM  Dr. Sarah Israel is at pt's bedside and will take her to the OR to intubate. CRITICAL CARE NOTE :  2:55 PM  IMPENDING DETERIORATION -airway, respiratory    ASSOCIATED RISK FACTORS - respiratory distress, impending airway obstruction    MANAGEMENT- bedside assessment, supervision of care    INTERPRETATION -  Blood pressure, treatment response to suspected allergic reaction    INTERVENTIONS - respiratory treatments, allergic reaction treatment    CASE REVIEW - sub-specialist, nursing, family    TREATMENT RESPONSE - stable    PERFORMED BY - self    NOTES   :  I have spent 30 minutes of critical care time involved in lab review, consultations with specialist, family decision- making, bedside attention and documentation.  During this entire length of time I was immediately available to the patient . Jigar Brown. Kami Denson MD  LABORATORY TESTS:  Recent Results (from the past 12 hour(s))   CBC WITH AUTOMATED DIFF    Collection Time: 04/05/17  2:56 PM   Result Value Ref Range    WBC 12.9 (H) 3.6 - 11.0 K/uL    RBC 4.61 3.80 - 5.20 M/uL    HGB 14.2 11.5 - 16.0 g/dL    HCT 42.5 35.0 - 47.0 %    MCV 92.2 80.0 - 99.0 FL    MCH 30.8 26.0 - 34.0 PG    MCHC 33.4 30.0 - 36.5 g/dL    RDW 17.0 (H) 11.5 - 14.5 %    PLATELET 787 064 - 234 K/uL    NEUTROPHILS 74 %    BAND NEUTROPHILS 10 %    LYMPHOCYTES 13 %    MONOCYTES 3 %    EOSINOPHILS 0 %    BASOPHILS 0 %    ABS. NEUTROPHILS 10.8 K/UL    ABS. LYMPHOCYTES 1.7 K/UL    ABS. MONOCYTES 0.4 K/UL    ABS. EOSINOPHILS 0.0 K/UL    ABS. BASOPHILS 0.0 K/UL    RBC COMMENTS ANISOCYTOSIS  1+        WBC COMMENTS TOXIC GRANULATION      DF MANUAL     METABOLIC PANEL, COMPREHENSIVE    Collection Time: 04/05/17  2:56 PM   Result Value Ref Range    Sodium 142 136 - 145 mmol/L    Potassium 2.7 (LL) 3.5 - 5.1 mmol/L    Chloride 102 97 - 108 mmol/L    CO2 26 21 - 32 mmol/L    Anion gap 14 5 - 15 mmol/L    Glucose 160 (H) 65 - 100 mg/dL    BUN 3 (L) 6 - 20 MG/DL    Creatinine 0.65 0.55 - 1.02 MG/DL    BUN/Creatinine ratio 5 (L) 12 - 20      GFR est AA >60 >60 ml/min/1.73m2    GFR est non-AA >60 >60 ml/min/1.73m2    Calcium 9.2 8.5 - 10.1 MG/DL    Bilirubin, total 0.5 0.2 - 1.0 MG/DL    ALT (SGPT) 44 12 - 78 U/L    AST (SGOT) 62 (H) 15 - 37 U/L    Alk.  phosphatase 423 (H) 45 - 117 U/L    Protein, total 7.7 6.4 - 8.2 g/dL    Albumin 3.4 (L) 3.5 - 5.0 g/dL    Globulin 4.3 (H) 2.0 - 4.0 g/dL    A-G Ratio 0.8 (L) 1.1 - 2.2     CK W/ REFLX CKMB    Collection Time: 04/05/17  2:56 PM   Result Value Ref Range    CK 59 26 - 192 U/L   TROPONIN I    Collection Time: 04/05/17  2:56 PM   Result Value Ref Range    Troponin-I, Qt. 0.04 <0.05 ng/mL   PROTHROMBIN TIME + INR    Collection Time: 04/05/17  2:56 PM   Result Value Ref Range    INR 1.2 (H) 0.9 - 1.1      Prothrombin time 12.0 (H) 9.0 - 11.1 sec   TYPE & SCREEN    Collection Time: 04/05/17  2:56 PM   Result Value Ref Range    Crossmatch Expiration 04/08/2017     ABO/Rh(D) A POSITIVE     Antibody screen NEG    NUCLEATED RBC    Collection Time: 04/05/17  2:56 PM   Result Value Ref Range    NRBC 0.0 0  WBC    ABSOLUTE NRBC 0.00 0.00 - 0.01 K/uL   BLOOD GAS, ARTERIAL    Collection Time: 04/05/17  5:55 PM   Result Value Ref Range    pH 7.55 (H) 7.35 - 7.45      PCO2 32 (L) 35.0 - 45.0 mmHg    PO2 142 (H) 80 - 100 mmHg    O2 SAT 99 (H) 92 - 97 %    BICARBONATE 27 (H) 22 - 26 mmol/L    BASE EXCESS 5.3 mmol/L    O2 METHOD VENTILATOR      FIO2 60 %    MODE A/C      Tidal volume 550      SET RATE 12      EPAP/CPAP/PEEP 5.0      Sample source ARTERIAL      SITE RIGHT RADIAL      ANGELA'S TEST YES     URINALYSIS W/ REFLEX CULTURE    Collection Time: 04/05/17  6:53 PM   Result Value Ref Range    Color YELLOW/STRAW      Appearance TURBID (A) CLEAR      Specific gravity 1.012 1.003 - 1.030      pH (UA) 6.0 5.0 - 8.0      Protein NEGATIVE  NEG mg/dL    Glucose NEGATIVE  NEG mg/dL    Ketone TRACE (A) NEG mg/dL    Bilirubin NEGATIVE  NEG      Blood NEGATIVE  NEG      Urobilinogen 0.2 0.2 - 1.0 EU/dL    Nitrites NEGATIVE  NEG      Leukocyte Esterase SMALL (A) NEG      WBC 10-20 0 - 4 /hpf    RBC 0-5 0 - 5 /hpf    Epithelial cells FEW FEW /lpf    Bacteria NEGATIVE  NEG /hpf    UA:UC IF INDICATED URINE CULTURE ORDERED (A) CNI      Hyaline cast 2-5 0 - 5 /lpf   GLUCOSE, POC    Collection Time: 04/05/17 11:26 PM   Result Value Ref Range    Glucose (POC) 147 (H) 65 - 100 mg/dL    Performed by [facility]        MEDICATIONS GIVEN:  Medications   sodium chloride 0.9 % nebu (not administered)   racEPINEPHrine (VAPONEFRIN) 2.25 % nebulizer solution (not administered)   propofol (DIPRIVAN) 10 mg/mL injection (not administered)   sodium chloride (NS) 0.9 % flush (not administered)   methylPREDNISolone (PF) (SOLU-MEDROL) injection 40 mg (not administered)   sodium chloride (NS) flush 5-10 mL (not administered)   sodium chloride (NS) flush 5-10 mL (not administered)   0.9% sodium chloride infusion (50 mL/hr IntraVENous New Bag 4/5/17 1806)   ondansetron (ZOFRAN) injection 4 mg (not administered)   famotidine (PF) (PEPCID) injection 20 mg (not administered)   propofol (DIPRIVAN) infusion (50 mcg/kg/min × 71.2 kg IntraVENous New Bag 4/5/17 1600)   potassium chloride 10 mEq/100 mL IVPB premix pgbk (not administered)   racEPINEPHrine (VAPONEFRIN) 2.25 % nebulizer solution (  Given 4/5/17 1432)   racEPINEPHrine (VAPONEFRIN) 2.25% nebulizer solution (0.25 mL Nebulization Given 4/5/17 1447)   famotidine (PF) (PEPCID) 20 mg in sodium chloride 0.9 % 10 mL injection (20 mg IntraVENous Given 4/5/17 1448)   racEPINEPHrine (VAPONEFRIN) 2.25% nebulizer solution (0.25 mL Nebulization Given 4/5/17 1458)   potassium chloride 10 mEq in 100 ml IVPB (10 mEq IntraVENous New Bag 4/5/17 1722)       IMPRESSION:  1. Allergic reaction, initial encounter    2. Inspiratory stridor        PLAN:  1. Admit to ENT    ADMIT NOTE:  2:54 PM  Patient is being admitted to the hospital by Maurice Mascorro. Sofi Mercedes MD.  The results of their tests and reasons for their admission have been discussed with them and/or available family. They convey agreement and understanding for the need to be admitted and for their admission diagnosis. Consultation has been made with the inpatient physician specialist for hospitalization. ATTESTATION:  This note is prepared by Olimpia Murrieta, acting as Scribe for [employer]. Maulik Valadez MD.    [employer]. Maulik Valadez MD: The scribe's documentation has been prepared under my direction and personally reviewed by me in its entirety. I confirm that the note above accurately reflects all work, treatment, procedures, and medical decision making performed by me.

## 2017-04-05 NOTE — CONSULTS
Ears/Nose/Throat Consult    Subjective:     Date of Consultation:  April 5, 2017    Referring Physician: blanca    History of Present Illness:   Patient is a 70 y.o.  female who is being seen for acute airway obstruction after chemotherapy. she was admitted to the [facility] for evaluation Emergent Intabation Needed. Patient Active Problem List    Diagnosis Date Noted    History of UTI 03/15/2017    Alpha galactosidase deficiency ([facility] [address] 03/15/2017    Urticaria of unknown origin 03/15/2017    Anaphylactic shock 03/14/2017    Cellulitis of face 03/08/2017    Urine blood 10/17/2016    Nausea & vomiting 04/15/2016    Metastatic colon cancer in female [facility]) 03/31/2016    Right upper quadrant abdominal pain 03/31/2016    [facility]     Past Medical History:   Diagnosis Date    Anemia 5-3-13    Hgb 7.4         received transfuion of two units    Anemia NEC     Arthritis     mild- low back    Cancer (HCC)     colon cancer    GERD (gastroesophageal reflux disease)     at times    Liver disease     spots on liver    Neuropathy     Thyroid disease     nodules      Family History   Problem Relation Age of Onset    Cancer Sister      colon    Colon Cancer Sister     Diabetes Mother     Heart Disease Father     Hypertension Father     Stroke Father     Diabetes Father     Colon Cancer Sister     Cancer Sister      lung      Social History   Substance Use Topics    Smoking status: Former Smoker     Packs/day: 1.00     Years: 20.00     Quit date: 6/14/1988    Smokeless tobacco: Never Used      Comment: quit 30 yrs ago    Alcohol use 7.0 oz/week     14 Cans of beer per week      Comment: couple beers/day     Past Surgical History:   Procedure Laterality Date    ABDOMEN SURGERY PROC UNLISTED  1/21/16    lap resection of abdominal mass with ileocolic/small bowel resection.     COLONOSCOPY N/A 11/11/2016    COLONOSCOPY performed by Qing Coto MD at [facility] ENDOSCOPY    HX CERVICAL FUSION      anterior cervical disc and fusion    HX  SECTION      HX GI  13    COLON RESECTION LAPAROSCOPIC RIGHT     HX VASCULAR ACCESS      Portacath      Current Facility-Administered Medications   Medication Dose Route Frequency    sodium chloride 0.9 % nebu        racEPINEPHrine (VAPONEFRIN) 2.25 % nebulizer solution        propofol (DIPRIVAN) 10 mg/mL injection         Facility-Administered Medications Ordered in Other Encounters   Medication Dose Route Frequency    sodium chloride (NS) flush 10-40 mL  10-40 mL IntraVENous PRN    sodium chloride 0.9 % injection 10 mL  10 mL IntraVENous PRN    lactated ringers infusion   IntraVENous CONTINUOUS    etomidate (AMIDATE) 2 mg/mL injection    PRN    propofol (DIPRIVAN) 10 mg/mL injection   IntraVENous PRN    midazolam (VERSED) injection   IntraVENous PRN    fentaNYL citrate (PF) injection   IntraVENous PRN    rocuronium (ZEMURON) injection   IntraVENous PRN    ondansetron (ZOFRAN) injection   IntraVENous PRN    propofol (DIPRIVAN) 10 mg/mL injection   IntraVENous CONTINUOUS    PHENYLephrine (NEOSYNEPHRINE) in NS syringe   IntraVENous PRN      Allergies   Allergen Reactions    Beef Containing Products Anaphylaxis    Heparin (Porcine) Hives     Heparin beef starts with hives and can lead to anaphylatic shock    Iodinated Contrast Media - Oral And Iv Dye Rash, Nausea and Vomiting and Other (comments)     Abdominal pain    Pork/Porcine Containing Products Anaphylaxis    Augmentin [Amoxicillin-Pot Clavulanate] Rash     Full body rash, NV    Bactrim [Sulfamethoprim Ds] Hives    Erythromycin Hives and Itching    Ciprofloxacin Unknown (comments)     Splitting headache    Macrobid [Nitrofurantoin Monohyd/M-Cryst] Other (comments)     Headaches, bad dreams        Review of Systems:  Hx neuropathy, headache, chemotherapy, colon cancer, cervical fusion, severe allergic reactions.      Objective:     Patient Vitals for the past 8 hrs:   BP Pulse Resp SpO2   17 1447 - - - 100 %   17 1437 123/80 (!) 112 - 100 %   17 1428 - (!) 115 26 100 %     Temp (24hrs), Av.7 °F (36.5 °C), Min:97.6 °F (36.4 °C), Max:97.8 °F (36.6 °C)         Physical Exam:   Pupils equal , reactive, nose without drainage, oc/op no lesions. Neck no adenopathy, biphasic stridor, lungs clear, heart tachycardia. Assessment:     Stridor following chemotherapy for colon cancer. Patient brought to operating room, intubated by anesthesia. View of airway during intubation- mild swelling left tvc, no edema glottis, supraglottis. Vocal cords midline bilaterally. Plan:     Concern about neuropathy, tvc paralysis. Plan remain intubated, icu, Decadron. Recommend mri of head without contrast. I will discuss with laryngologists in my group.         Signed By: Tom Plummer MD     2017

## 2017-04-05 NOTE — IP AVS SNAPSHOT
[address] [facility] 
[phone] Patient: Darnell Barrett MRN: [id number] UDV:5/74/9628 You are allergic to the following Allergen Reactions Beef Containing Products Anaphylaxis Heparin (Porcine) Hives Heparin beef starts with hives and can lead to anaphylatic shock Iodinated Contrast Media - Oral And Iv Dye Rash Nausea and Vomiting Other (comments) Abdominal pain Pork/Porcine Containing Products Anaphylaxis Augmentin (Amoxicillin-Pot Clavulanate) Rash Full body rash, NV Bactrim (Sulfamethoprim Ds) Hives Erythromycin Hives Itching Ciprofloxacin Unknown (comments) Splitting headache Macrobid (Nitrofurantoin Monohyd/M-Cryst) Other (comments) Headaches, bad dreams Recent Documentation Weight BMI OB Status Smoking Status 75 kg 27.51 kg/m2 Postmenopausal Former Smoker Emergency Contacts  (Rel.) Home Phone Work Phone Mobile Phone Yang Khanna (Son) [phone] -- [phone] Ailyn Donnelly (Son) [phone] -- -- About your hospitalization You were admitted on:  April 5, 2017 You last received care in the:  [facility] MEDICAL ONCOLOGY You were discharged on:  April 10, 2017 Why you were hospitalized Your primary diagnosis was:  Not on File Your diagnoses also included:  Respiratory Failure (Hcc), Metastatic Colon Cancer In Female (Hcc), History Of Uti, Colon Cancer (Hcc) Providers Seen During Your Hospitalizations Provider Role Specialty Primary office phone Luz Nieto. Trent Morrison MD Attending Provider Emergency Medicine [phone] Brayden Benitez MD Attending Provider Internal Medicine [phone] Your Primary Care Physician (PCP) Primary Care Physician Office Phone Office Fax Blade Perla [phone] Follow-up Information Follow up With Details Comments Contact Info Umberto Riggs MD   [address] [facility] 
[phone] Carlos Alberto Khoury MD   [address] [facility] [address] [facility] 
[phone] Appointments for Next 14 days 4/19/2017  8:00 AM  [facility]  
 4/19/2017  9:15 AM  Extension [facility] at [facility]  
 4/21/2017  2:00 PM  [facility] Current Discharge Medication List  
  
CONTINUE these medications which have NOT CHANGED Dose & Instructions Dispensing Information Comments Morning Noon Evening Bedtime  
 acetaminophen 500 mg tablet Commonly known as:  TYLENOL Your last dose was: Your next dose is: Take  by mouth every six (6) hours as needed for Pain. Refills:  0 ALLEGRA-D 12 HOUR  mg Tb12 Generic drug:  fexofenadine-pseudoephedrine Your last dose was: Your next dose is:    
   
   
 Dose:  1 Tab Take 1 Tab by mouth every twelve (12) hours. Refills:  0  
     
   
   
   
  
 oxyCODONE IR 5 mg immediate release tablet Commonly known as:  Cierra Neighbor Your last dose was: Your next dose is:    
   
   
 Dose:  5 mg Take 1 Tab by mouth every four (4) hours as needed for Pain. Max Daily Amount: 30 mg.  
 Quantity:  60 Tab Refills:  0  
     
   
   
   
  
 potassium chloride 20 mEq tablet Commonly known as:  K-DUR, KLOR-CON Your last dose was: Your next dose is:    
   
   
 Dose:  20 mEq Take 1 Tab by mouth two (2) times a day. Quantity:  60 Tab Refills:  3 Discharge Instructions None Discharge Orders None MyChart Announcement  We are excited to announce that we are making your provider's discharge notes available to you in FanXT. You will see these notes when they are completed and signed by the physician that discharged you from your recent hospital stay. If you have any questions or concerns about any information you see in FanXT, please call the Health Information Department where you were seen or reach out to your Primary Care Provider for more information about your plan of care. Introducing [facility]! Dear Sammy Colindres: 
Thank you for requesting a FanXT account. Our records indicate that you already have an active FanXT account. You can access your account anytime at [email or web address] Did you know that you can access your hospital and ER discharge instructions at any time in FanXT? You can also review all of your test results from your hospital stay or ER visit. Additional Information If you have questions, please visit the Frequently Asked Questions section of the FanXT website at [email or web address] Remember, FanXT is NOT to be used for urgent needs. For medical emergencies, dial 911. Now available from your iPhone and Android! General Information Please provide this summary of care documentation to your next provider. Patient Signature:  ____________________________________________________________ Date:  ____________________________________________________________  
  
Omar Nicolás Provider Signature:  ____________________________________________________________ Date:  ____________________________________________________________

## 2017-04-06 NOTE — PROGRESS NOTES
Otolaryngology, Head and Neck Surgery        No events overnight. Hasn't gotten an MRI yet. Visit Vitals    BP 96/54    Pulse 68    Temp 98.5 °F (36.9 °C)    Resp 13    SpO2 100%     Intubated, awake and responsive  Neck: no lesions, masses      A/p  Bilateral vocal cord paralysis. Discussed with the family. No history of stridor, dyspnea on exertion, voice changes prior to this. There have been reports of bilateral vocal cord paralysis with chemotherapy agents: notably cisplatin and vincristine. Not specifically with oxaliplatin, but I would suspect that it is certainly possible. If the MRI is negative for metastatic lesions causing this paralysis, then I have no other cause for the paralysis than the chemotherapy. In these isolated reports, the paralysis was temporary but in took between 6-11 weeks to resolve. Based on these episodes, tracheotomy is likely the best course, though it is reasonable to consider extubation in the operating room to see if there is any movement of the cords.     Monisha Davidson MD

## 2017-04-06 NOTE — PROGRESS NOTES
Hospitalist Progress Note    NAME: April Moise   :  1945   MRN:  [id number]       Interim Hospital Summary: 70 y.o. female whom presented on 2017 with      Assessment / Plan:    Acute respiratory failure: POA  Suspect Secondary to Chemo related neurotoxicity (oxaliplatin) vs allergic reaction causing anaphylaxis because pt has stridor on presenetation  PE was not considered as differential   No edema seen at intubation, possible vocal cord paralysis  On IV steroids/pepcid, Currently on vent, intensivist on board  ENT on board     Metastatic colon cancer, history of surgeries on chemo  Follows with dr. Chiara Hastings  On chemo as outpt  For MRI brain    ? Rash on the face vectibix related -- added doxy yesterday ( was also to cover uti? ??)  Oncology follow up, doxy on hold per intensivist     Hypokalemia:   replace      Code Status: full  Surrogate Decision Maker:     DVT Prophylaxis:scds ( need to confirm allergies to heparin)  GI Prophylaxis: not indicated     Baseline: independent     Spoke with the daughter and updated         Subjective:     Chief Complaint / Reason for Physician Visit  \"intubated\". Discussed with RN events overnight. Review of Systems:  Symptom Y/N Comments  Symptom Y/N Comments   Fever/Chills    Chest Pain     Poor Appetite    Edema     Cough    Abdominal Pain     Sputum    Joint Pain     SOB/SEVILLA    Pruritis/Rash     Nausea/vomit    Tolerating PT/OT     Diarrhea    Tolerating Diet     Constipation    Other       Could NOT obtain due to: Intubated and sedated     Objective:     VITALS:   Last 24hrs VS reviewed since prior progress note.  Most recent are:  Patient Vitals for the past 24 hrs:   Temp Pulse Resp BP SpO2   17 1300 - 68 13 96/54 100 %   17 1200 98.5 °F (36.9 °C) 67 12 110/72 100 %   17 1100 - 61 12 113/59 100 %   17 1000 - 67 12 103/61 99 %   17 0900 - 75 12 119/73 100 %   17 0800 97.2 °F (36.2 °C) 76 14 127/79 100 %   17 0754 - 77 12 - 100 %   04/06/17 0700 - 77 16 134/75 100 %   04/06/17 0637 - 74 22 - 100 %   04/06/17 0600 - 68 12 101/63 99 %   04/06/17 0500 - 71 12 111/68 100 %   04/06/17 0416 - 69 12 - 100 %   04/06/17 0400 98.2 °F (36.8 °C) 69 12 116/70 100 %   04/06/17 0300 - 68 12 108/67 100 %   04/06/17 0225 - 67 12 103/64 100 %   04/06/17 0200 - 65 12 98/61 100 %   04/06/17 0100 - 60 12 103/60 100 %   04/06/17 0026 - 60 12 - 100 %   04/06/17 0001 98.2 °F (36.8 °C) 60 12 106/66 100 %   04/05/17 2300 - 71 12 128/68 100 %   04/05/17 2200 - 73 12 110/64 100 %   04/05/17 2100 - 82 12 109/61 100 %   04/05/17 2015 - 88 12 113/62 100 %   04/05/17 2000 99 °F (37.2 °C) 94 15 134/72 100 %   04/05/17 1956 - 94 19 - 100 %   04/1945 99.1 °F (37.3 °C) 93 12 115/66 100 %   04/05/17 1930 - 96 12 125/67 99 %   04/05/17 1915 - 97 12 124/69 99 %   04/05/17 1900 - 98 12 122/70 99 %   04/05/17 1845 - 97 12 124/62 100 %   04/05/17 1830 - 100 12 128/65 100 %   04/05/17 1815 - (!) 103 12 126/71 99 %   04/05/17 1800 - (!) 103 12 122/68 100 %   04/05/17 1745 - (!) 104 14 126/61 100 %   04/05/17 1730 - (!) 102 12 120/62 100 %   04/05/17 1715 - (!) 102 12 136/67 100 %   04/05/17 1700 - (!) 102 14 149/71 100 %   04/05/17 1645 - (!) 103 18 131/73 100 %   04/05/17 1630 - (!) 102 15 129/66 100 %   04/05/17 1615 - (!) 101 15 133/64 100 %   04/05/17 1600 - (!) 102 14 129/65 100 %   04/05/17 1555 - (!) 101 12 117/56 100 %   04/05/17 1550 - (!) 101 11 112/55 100 %   04/05/17 1548 - 100 12 - 100 %   04/05/17 1545 - 100 13 110/55 100 %   04/05/17 1544 98.4 °F (36.9 °C) 100 12 115/54 100 %   04/05/17 1542 98.4 °F (36.9 °C) 100 14 115/54 -   04/05/17 1447 - - - - 100 %   04/05/17 1437 - (!) 112 - 123/80 100 %   04/05/17 1428 - (!) 115 26 - 100 %       Intake/Output Summary (Last 24 hours) at 04/06/17 1424  Last data filed at 04/06/17 1300   Gross per 24 hour   Intake          2074.67 ml   Output             2413 ml   Net          -338.33 ml        PHYSICAL EXAM:  General: intubated, no acute distress    EENT:  EOMI. Anicteric sclerae. MMM  Resp:  CTA bilaterally, no wheezing or rales. No accessory muscle use  CV:  Regular  rhythm,  No edema  GI:  Soft, Non distended, Non tender.  +Bowel sounds  Neurologic:  intubated and sedated  Psych:   Not anxious nor agitated  Skin:  Facial rash    Reviewed most current lab test results and cultures  YES  Reviewed most current radiology test results   YES  Review and summation of old records today    NO  Reviewed patient's current orders and MAR    YES  PMH/SH reviewed - no change compared to H&P  ________________________________________________________________________  Care Plan discussed with:    Comments   Patient     Family  x    RN x    Care Manager     Consultant                        Multidiciplinary team rounds were held today with , nursing, pharmacist and clinical coordinator. Patient's plan of care was discussed; medications were reviewed and discharge planning was addressed. ________________________________________________________________________  Total NON critical care TIME:  35   Minutes    Total CRITICAL CARE TIME Spent:   Minutes non procedure based      Comments   >50% of visit spent in counseling and coordination of care     ________________________________________________________________________  Jayden Read MD     Procedures: see electronic medical records for all procedures/Xrays and details which were not copied into this note but were reviewed prior to creation of Plan. LABS:  I reviewed today's most current labs and imaging studies.   Pertinent labs include:  Recent Labs      04/06/17   0343  04/05/17   1456  04/05/17   0925   WBC  11.6*  12.9*  7.7   HGB  12.4  14.2  12.9   HCT  38.5  42.5  38.9   PLT  104*  168  147*     Recent Labs      04/06/17   0343  04/05/17   1456  04/05/17 0925   NA  144  142   --    K  3.0*  2.7*  2.8*   CL  106  102   --    CO2  29  26   --    GLU 149*  160*   --    BUN  6  3*   --    CREA  0.57  0.65   --    CA  8.7  9.2   --    MG  1.5*   --   1.7   PHOS  2.2*   --    --    ALB  2.9*  3.4*   --    TBILI  0.8  0.5   --    SGOT  81*  62*   --    ALT  52  44   --    INR   --   1.2*   --        Signed: Santos Akins MD

## 2017-04-06 NOTE — PROGRESS NOTES
Nutrition Assessment:    RECOMMENDATIONS:   Consider initiating TF in 24-48h (RD to follow with recommendations)   Continue electrolyte repletion prn    DIETITIANS INTERVENTIONS/PLAN:   Monitor plan of care    ASSESSMENT:   Pt admitted with respiratory failure. PMH: GERD, small bowel resection, colon CA + mets (chemo). Chart reviewed, case discussed during CCU rounds. Pt is intubated and sedated on propofol @ 15mL/h which provides 396 kcals daily. NGT to suction. MST for unsure of wt loss. Noted per EMR pt has had 7.2% wt loss over the past 3 months. K+, Mag and Phos all requiring repletion. Will allow electrolytes to normalize before initiating nutrition support. SUBJECTIVE/OBJECTIVE:   Pt intubated and sedated   Diet Order: NPO  % Eaten:  No data found. Pertinent Medications:pepcid, MagSO4, solumedrol, KCl, KPhos; [email or web address]); Drips: propofol. Chemistries:  Lab Results   Component Value Date/Time    Sodium 144 04/06/2017 03:43 AM    Potassium 3.0 04/06/2017 03:43 AM    Chloride 106 04/06/2017 03:43 AM    CO2 29 04/06/2017 03:43 AM    Anion gap 9 04/06/2017 03:43 AM    Glucose 149 04/06/2017 03:43 AM    BUN 6 04/06/2017 03:43 AM    Creatinine 0.57 04/06/2017 03:43 AM    BUN/Creatinine ratio 11 04/06/2017 03:43 AM    GFR est AA >60 04/06/2017 03:43 AM    GFR est non-AA >60 04/06/2017 03:43 AM    Calcium 8.7 04/06/2017 03:43 AM    AST (SGOT) 81 04/06/2017 03:43 AM    Alk. phosphatase 182 04/06/2017 03:43 AM    Protein, total 6.6 04/06/2017 03:43 AM    Albumin 2.9 04/06/2017 03:43 AM    Globulin 3.7 04/06/2017 03:43 AM    A-G Ratio 0.8 04/06/2017 03:43 AM    ALT (SGPT) 52 04/06/2017 03:43 AM      Anthropometrics: Height:   Weight: 71.2 kg (156 lb 15.5 oz)    IBW (%IBW):   ( ) UBW (%UBW): 76.7 kg (169 lb 1.5 oz) (Jan 2017) (92.83 %)    BMI: Body mass index is 26.12 kg/(m^2).     This BMI is indicative of:  []Underweight   [x]Normal(for age)   []Overweight   [] Obesity   [] Extreme Obesity (BMI>40)  Estimated Nutrition Needs (Based on): 1453 Kcals/day (PSU (MSJ 1228)) , 71 g (-85 (1-1.2gPro/kg)) Protein  Carbohydrate: At Least 130 g/day  Fluids: 1500 mL/day    Last BM: PTA   [x]Active     []Hyperactive  []Hypoactive       [] Absent   BS  Skin:    [x] Intact   [] Incision  [] Breakdown   [] DTI   [] Tears/Excoriation/Abrasion  []Edema [] Other: Wt Readings from Last 30 Encounters:   04/06/17 71.2 kg (156 lb 15.5 oz)   04/05/17 71.2 kg (157 lb)   04/05/17 71.7 kg (158 lb)   04/05/17 71.2 kg (156 lb 15.5 oz)   03/22/17 73.7 kg (162 lb 8 oz)   03/14/17 71.4 kg (157 lb 6.5 oz)   03/08/17 74.2 kg (163 lb 9.6 oz)   03/08/17 74.1 kg (163 lb 6.4 oz)   02/22/17 74.8 kg (165 lb)   02/22/17 75.2 kg (165 lb 11.2 oz)   02/08/17 76.7 kg (169 lb)   02/08/17 76.4 kg (168 lb 8 oz)   01/25/17 75.3 kg (166 lb)   01/11/17 76.7 kg (169 lb)   01/11/17 76.7 kg (169 lb 3.2 oz)   12/28/16 76.8 kg (169 lb 4 oz)   12/14/16 76.7 kg (169 lb)   12/14/16 76.4 kg (168 lb 8 oz)   11/30/16 76.3 kg (168 lb 4 oz)   11/23/16 76.6 kg (168 lb 12.8 oz)   11/16/16 75.8 kg (167 lb)   11/11/16 76.8 kg (169 lb 6 oz)   10/14/16 77.8 kg (171 lb 9.6 oz)   09/07/16 80 kg (176 lb 5 oz)   08/24/16 79.6 kg (175 lb 8 oz)   08/21/16 79.1 kg (174 lb 6.1 oz)   08/10/16 80.3 kg (177 lb)   08/10/16 80.4 kg (177 lb 4.8 oz)   08/09/16 81.2 kg (179 lb)   07/27/16 80 kg (176 lb 6.4 oz)      NUTRITION DIAGNOSES:   Problem:  Unintended weight loss      Etiology: related to poor appetite and metastatic colon CA      Signs/Symptoms: as evidenced by 7.2% wt loss over the past 3 months. NUTRITION INTERVENTIONS:                     GOAL:   Pt will start on PO vs nutrition support in 2-4 days.      Cultural, Christianity, or Ethnic Dietary Needs: None     LEARNING NEEDS (Diet, Food/Nutrient-Drug Interaction):    [x] None Identified   [] Identified and Education Provided/Documented   [] Identified and Pt declined/was not appropriate      [x] Interdisciplinary Care Plan Reviewed/Documented    [x] Participated in Discharge Planning:  Unable to determine    [x] Interdisciplinary Rounds     NUTRITION RISK:    [x] High              [] Moderate           []  Low  []  Minimal/Uncompromised    PT SEEN FOR:    []  MD Consult: []Calorie Count      []Diabetic Diet Education        []Diet Education     []Electrolyte Management     []General Nutrition Management and Supplements     []Management of Tube Feeding     []TPN Recommendations    [x]  RN Referral:  [x]MST score >=2     []Enteral/Parenteral Nutrition PTA     []Pregnant: Gestational DM or Multigestation   []  Low BMI  []  DTR Referral       Nano Nguyễn RD  Pager [phone]  Weekend Pager [phone]

## 2017-04-06 NOTE — PROGRESS NOTES
04/06/17 0637   Weaning Parameters   Spontaneous Breathing Trial Complete Yes   Resp Rate Observed 15   Ve 5.4      RSBI 42   Patient tolerating SBT  Placed back on AC12/500/40/+5 to maintain airway

## 2017-04-06 NOTE — PROGRESS NOTES
1930-Transfer in report received from [address].  1955-Pt arrived to room 2522.  2000-Admission assessment complete, propofol continued at 50mcg, NS 50mL/hr, rash on face noted, NSR, VSS, GABRIEL.  2015-Family at bedside, updated on plan of care. 2111-Bilateral wrist restraints started for patient safety. 2300-Spoke with Dr. Carla Amin, pt grimacing new orders for PRN fentanyl 50mcg q4h for pain, will continue to monitor. 0000-Reassessment completed, no changes. 0400-Reassessment completed, no changes.   0700-Bedside report given to [facility] Nixon Peres, RN

## 2017-04-06 NOTE — PERIOP NOTES
TRANSFER - OUT REPORT:    Verbal report given to Suhail Raphael RN and Kendra Horner RN(name) on Jayne Appiah  being transferred to CCU 2522(unit) for routine post - op       Report consisted of patients Situation, Background, Assessment and   Recommendations(SBAR). Information from the following report(s) SBAR, Kardex, Procedure Summary, Intake/Output, MAR and Recent Results was reviewed with the receiving nurse. Opportunity for questions and clarification was provided.       Patient transported with:   Monitor  O2 @ 15 liters  Registered Nurse   RT

## 2017-04-06 NOTE — PROGRESS NOTES
[facility]  Pulmonary, Critical Care, and Sleep Medicine    Name: Sarah Castro MRN: [id number]   : 1945 Hospital: [facility]   Date: 2017        IMPRESSION:   · Acute respiratory failure - airway protection  · Stridor - vocal cord paralysis - ?due to metastatic tumor vs idiosyncratic reaction to chemo  · Metastatic colon cancer  · Facial rash  · Hypokalemia       PLAN:   · Ventilator support   · Replete electrolytes  · ENT evaluation ongoing  · MRI to be done - ?metastases  · Steroids   · Hold doxycycline in case this was an allergic reaction (though seems less likely at this point)  · PPI  · DVT prophylaxis with SCDs only (heparin allergy)     Subjective/Interval History:   I have reviewed the flowsheet and previous days notes. The patient is unable to give any meaningful history or review of systems because the patient is:   Intubated/sedated     The patient is critically ill on:      Mechanical ventilation     Review of Systems   Unable to perform ROS: Intubated     Objective:   Vital Signs:    Visit Vitals    /79 (BP 1 Location: Left arm, BP Patient Position: At rest;Head of bed elevated (Comment degrees))    Pulse 76    Temp 97.2 °F (36.2 °C)    Resp 14    SpO2 100%       O2 Device: Endotracheal tube, Ventilator   O2 Flow Rate (L/min): 10 l/min   Temp (24hrs), Av.2 °F (36.8 °C), Min:97.2 °F (36.2 °C), Max:99.1 °F (37.3 °C)       Intake/Output:   Last shift:      701 - 1900  In: 67.1 [I.V.:67.1]  Out: 125 [Urine:75]  Last 3 shifts: 1901 - 700  In: 1635.3 [I.V.:1600.3]  Out:  [ZDOBD:4832]    Intake/Output Summary (Last 24 hours) at 17 0822  Last data filed at 17 0800   Gross per 24 hour   Intake           1702.4 ml   Output             2115 ml   Net           -412.6 ml     Hemodynamics:   PAP:   CO:     Wedge:   CI:     CVP:    SVR:       PVR:       Ventilator Settings:  Mode Rate Tidal Volume Pressure FiO2 PEEP   Assist control   500 ml  5 cm H2O 40 % 5 cm H20     Peak airway pressure: 20 cm H2O    Minute ventilation: 8.29 l/min       Physical Exam   Constitutional: No distress. She is sedated and intubated. HENT:   Head: Normocephalic and atraumatic. Mouth/Throat: No oropharyngeal exudate. Eyes: No scleral icterus. Cardiovascular: Normal rate and regular rhythm. No murmur heard. Pulmonary/Chest: She is intubated. No respiratory distress. She has no wheezes. Abdominal: Soft. Bowel sounds are normal. She exhibits no distension. There is no tenderness. Musculoskeletal: She exhibits no edema. Skin: Skin is warm and dry. Rash (on face) noted.      Data:     Current Facility-Administered Medications   Medication Dose Route Frequency    methylPREDNISolone (PF) (SOLU-MEDROL) injection 40 mg  40 mg IntraVENous Q8H    sodium chloride (NS) flush 5-10 mL  5-10 mL IntraVENous Q8H    0.9% sodium chloride infusion  50 mL/hr IntraVENous CONTINUOUS    famotidine (PF) (PEPCID) injection 20 mg  20 mg IntraVENous Q12H    propofol (DIPRIVAN) infusion  5-50 mcg/kg/min IntraVENous TITRATE                Labs:  Recent Labs      04/06/17   0343  04/05/17   1456  04/05/17   0925   WBC  11.6*  12.9*  7.7   HGB  12.4  14.2  12.9   HCT  38.5  42.5  38.9   PLT  104*  168  147*     Recent Labs      04/06/17   0343  04/05/17   1456  04/05/17   0925   NA  144  142   --    K  3.0*  2.7*  2.8*   CL  106  102   --    CO2  29  26   --    GLU  149*  160*   --    BUN  6  3*   --    CREA  0.57  0.65   --    CA  8.7  9.2   --    MG  1.5*   --   1.7   PHOS  2.2*   --    --    ALB  2.9*  3.4*   --    TBILI  0.8  0.5   --    SGOT  81*  62*   --    ALT  52  44   --    INR   --   1.2*   --      Recent Labs      04/06/17   0622  04/05/17   1755   PH  7.52*  7.55*   PCO2  38  32*   PO2  177*  142*   HCO3  30*  27*   FIO2  40  60       Imaging:  I have personally reviewed the patients radiographs and have reviewed the reports:  No change, no acute        Total critical care time exclusive of procedures: 20 minutes  Nikki Coffey MD

## 2017-04-06 NOTE — PROGRESS NOTES
Attended Interdisciplinary rounds in Critical Care Unit, where patient care was discussed. Visit by: Georgie Ruffin. Yanely Avelar.  Boni Ragsdale MA, [address]

## 2017-04-06 NOTE — PROGRESS NOTES
06:12  Sedation Awakening Trial :  Diprivan off x 13 minutes. Pt awake and able to follow commands. At times restless but easily directed. RR 14-22.    06:42  SBT passed. Remains on CPAP. RR 18-24. Sats 100%    07;00  Dr Savage Overcast here and discussed no extubation at present.   Propofol resumed at 25 mcg

## 2017-04-07 NOTE — PROGRESS NOTES
Nutrition:  Chart reviewed, case discussed during CCU rounds. Pt remains on vent, new PSU is 1411. Propofol running at 10.7mL/h which provides 282 kcals daily. Plan is to start feeds today. Given unknown status of nutrition prior to admission and wt loss per EMR (7.2% over the past 3 months), will start trophic feeds and slowly advance rate. Monitor for lactic acidosis and refeeding syndrome as she may be at high risk. Will monitor need for thiamin supplementation. Start:     Initiate Osmolite 1.2 @ 10mL/h, advance rate 10mL q 12h as tolerated to Goal Rate of 40mL/h + 1 packet Prosource (liquid protein) BID + 100mL H2O flush q 6h (provides 1272kcals/83gPro/1187mL)    Will continue to monitor tolerance closely, thank you!     Darrin Sullivan RD  Pager [phone]

## 2017-04-07 NOTE — PROGRESS NOTES
Hospitalist Progress Note    NAME: Scotty Simmons   :  1945   MRN:  [id number]       Interim Hospital Summary: 70 y.o. female whom presented on 2017 with      Assessment / Plan:    Acute respiratory failure: POA  Suspect Secondary to Chemo related neurotoxicity (oxaliplatin) vs allergic reaction causing anaphylaxis because pt has stridor on presenetation  PE was not considered as differential   No edema seen at intubation, possible vocal cord paralysis  improving  On IV steroids/pepcid, Currently on vent, intensivist/ENT on board       Metastatic colon cancer, history of surgeries on chemo  Follows with dr. Jerrica Slade  On chemo as outpt  For MRI brain today    ? Rash on the face vectibix related -- added doxy yesterday ( was also to cover uti? ??)  Oncology follow up, doxy on hold per intensivist     Hypokalemia:   replace      Code Status: full  Surrogate Decision Maker:     DVT Prophylaxis:scds ( need to confirm allergies to heparin)  GI Prophylaxis: not indicated     Baseline: independent     Spoke with the daughter and updated         Subjective:     Chief Complaint / Reason for Physician Visit  \"intubated\". Discussed with RN events overnight. Review of Systems:  Symptom Y/N Comments  Symptom Y/N Comments   Fever/Chills    Chest Pain     Poor Appetite    Edema     Cough    Abdominal Pain     Sputum    Joint Pain     SOB/SEVILLA    Pruritis/Rash     Nausea/vomit    Tolerating PT/OT     Diarrhea    Tolerating Diet     Constipation    Other       Could NOT obtain due to: Intubated and sedated     Objective:     VITALS:   Last 24hrs VS reviewed since prior progress note.  Most recent are:  Patient Vitals for the past 24 hrs:   Temp Pulse Resp BP SpO2   17 1200 98 °F (36.7 °C) 77 19 127/79 97 %   17 1146 - 71 14 - 99 %   17 1145 - 74 16 130/76 98 %   17 0954 - 70 12 130/66 -   17 0805 - 73 16 - 100 %   17 0800 98.9 °F (37.2 °C) 69 14 117/72 99 %   17 0700 - 78 18 123/69 100 %   04/07/17 0600 98.4 °F (36.9 °C) 74 12 112/65 100 %   04/07/17 0500 - 73 13 132/69 100 %   04/07/17 0400 - 71 12 125/66 99 %   04/07/17 0300 98 °F (36.7 °C) 67 11 119/71 99 %   04/07/17 0200 - 77 18 (!) 141/91 100 %   04/07/17 0100 - 66 12 105/61 100 %   04/07/17 0000 - 71 18 107/57 99 %   04/06/17 2300 97.8 °F (36.6 °C) 77 12 106/61 99 %   04/06/17 2200 - 75 12 110/60 99 %   04/06/17 2120 - 66 12 - 99 %   04/06/17 2100 - 68 12 114/61 99 %   04/06/17 2000 98.8 °F (37.1 °C) 67 12 125/76 99 %   04/06/17 1900 - 74 18 126/75 100 %   04/06/17 1800 - 72 12 125/72 100 %   04/06/17 1700 - 69 12 111/60 98 %   04/06/17 1600 98.1 °F (36.7 °C) 76 13 135/43 100 %   04/06/17 1506 - 63 12 - 100 %   04/06/17 1500 - 63 12 107/58 100 %   04/06/17 1400 - 65 12 91/52 99 %       Intake/Output Summary (Last 24 hours) at 04/07/17 1356  Last data filed at 04/07/17 1200   Gross per 24 hour   Intake          1159.61 ml   Output             2590 ml   Net         -1430.39 ml        PHYSICAL EXAM:  General: intubated, no acute distress    EENT:  EOMI. Anicteric sclerae. MMM  Resp:  CTA bilaterally, no wheezing or rales. No accessory muscle use  CV:  Regular  rhythm,  No edema  GI:  Soft, Non distended, Non tender.  +Bowel sounds  Neurologic:  intubated and sedated  Psych:   Not anxious nor agitated  Skin:  Facial rash    Reviewed most current lab test results and cultures  YES  Reviewed most current radiology test results   YES  Review and summation of old records today    NO  Reviewed patient's current orders and MAR    YES  PMH/SH reviewed - no change compared to H&P  ________________________________________________________________________  Care Plan discussed with:    Comments   Patient     Family  x    RN x    Care Manager     Consultant                        Multidiciplinary team rounds were held today with , nursing, pharmacist and clinical coordinator.   Patient's plan of care was discussed; medications were reviewed and discharge planning was addressed. ________________________________________________________________________  Total NON critical care TIME:  35   Minutes    Total CRITICAL CARE TIME Spent:   Minutes non procedure based      Comments   >50% of visit spent in counseling and coordination of care     ________________________________________________________________________  Izzy Aguero MD     Procedures: see electronic medical records for all procedures/Xrays and details which were not copied into this note but were reviewed prior to creation of Plan. LABS:  I reviewed today's most current labs and imaging studies.   Pertinent labs include:  Recent Labs      04/07/17   0642  04/06/17   0343  04/05/17   1456   WBC  11.8*  11.6*  12.9*   HGB  11.4*  12.4  14.2   HCT  35.8  38.5  42.5   PLT  106*  104*  168     Recent Labs      04/07/17   0642  04/06/17   0343  04/05/17   1456  04/05/17   0925   NA  147*  144  142   --    K  3.7  3.0*  2.7*  2.8*   CL  113*  106  102   --    CO2  25  29  26   --    GLU  93  149*  160*   --    BUN  10  6  3*   --    CREA  0.51*  0.57  0.65   --    CA  7.9*  8.7  9.2   --    MG  2.1  1.5*   --   1.7   PHOS  2.2*  2.2*   --    --    ALB   --   2.9*  3.4*   --    TBILI   --   0.8  0.5   --    SGOT   --   81*  62*   --    ALT   --   52  44   --    INR   --    --   1.2*   --        Signed: Izzy Aguero MD

## 2017-04-07 NOTE — PROGRESS NOTES
Problem: Mobility Impaired (Adult and Pediatric)  Goal: *Acute Goals and Plan of Care (Insert Text)  Physical Therapy Goals  Initiated 4/7/2017  1. Patient will move from supine to sit and sit to supine , scoot up and down and roll side to side in bed with modified independence within 7 day(s). 2. Patient will transfer from bed to chair and chair to bed with minimal assistance/contact guard assist using the least restrictive device within 7 day(s). 3. Patient will perform sit to stand with minimal assistance/contact guard assist within 7 day(s). Will establish ambulation goals when appropriate. PHYSICAL THERAPY EVALUATION  Patient: Marie Prince (77 y.o. female)  Date: 4/7/2017  Primary Diagnosis: Emergent Intabation Needed  Respiratory failure ([address]  Procedure(s) (LRB):  EMERGENT CONTROLLED INTABATION (N/A) 2 Days Post-Op   Precautions:  Fall      ASSESSMENT :  Based on the objective data described below, the patient presents with mild generalized weakness, impaired sitting balance, impaired functional mobility and decreased activity tolerance following admission for respiratory failure on 4/5/17. Pt received supine in bed on ventilator (A/C 12, FiO2 40%, PEEP 5) with son present and agreeable to PT evaluation. RN and intensivist cleared pt for bed to chair mobility and VSS. Pt performed bed mobility with min-max A x2, demonstrating mild deconditioning but good overall strength and mobility. Pt tolerated sitting at EOB, requring SBA-Katerina for balance. Following ~5 minutes in sitting, pt visibly unwell and requesting to lie back down. Pt was unable to communicate exact reasoning, but denied pain, discomfort, or dizziness. She was assisted into supine and left with all needs met. VSS throughout treatment session (reported in activity tolerance section). RN notified of pt's response to upright activity. Recommending HH vs. Rehab for discharge pending pt's progress in acute PT.  Pt would benefit from further skilled acute PT to improve overall independence and safety with functional mobility. Patient will benefit from skilled intervention to address the above impairments. Patients rehabilitation potential is considered to be Fair  Factors which may influence rehabilitation potential include:   [ ]         None noted  [ ]         Mental ability/status  [X]         Medical condition  [ ]         Home/family situation and support systems  [ ]         Safety awareness  [ ]         Pain tolerance/management  [ ]         Other:        PLAN :  Recommendations and Planned Interventions:  [X]           Bed Mobility Training             [X]    Neuromuscular Re-Education  [X]           Transfer Training                   [ ]    Orthotic/Prosthetic Training  [ ]           Gait Training                         [ ]    Modalities  [X]           Therapeutic Exercises           [ ]    Edema Management/Control  [X]           Therapeutic Activities            [X]    Patient and Family Training/Education  [ ]           Other (comment):     Frequency/Duration: Patient will be followed by physical therapy  4 times a week to address goals. Discharge Recommendations: Rehab vs. Home Health  Further Equipment Recommendations for Discharge: TBD       SUBJECTIVE:   Patient nonverbal this date due to ventilator; however, nodded appropriately to yes/no questions. OBJECTIVE DATA SUMMARY:   HISTORY:    Past Medical History:   Diagnosis Date    Anemia 5-3-13     Hgb 7.4         received transfuion of two units    Anemia NEC      Arthritis       mild- low back    Cancer (HCC)       colon cancer    GERD (gastroesophageal reflux disease)       at times    Liver disease       spots on liver    Neuropathy      Thyroid disease       nodules       Past Surgical History:   Procedure Laterality Date    ABDOMEN SURGERY PROC UNLISTED   1/21/16     lap resection of abdominal mass with ileocolic/small bowel resection.     COLONOSCOPY N/A 2016     COLON[facility] END[facility]COPY    HX CERVICAL FUSION        anterior cervical disc and fusion    HX  SECTION       HX GI   13     COLON RESECTION LAPAROSCOPIC RIGHT     HX VASCULAR ACCESS         Portacath      Prior Level of Function/Home Situation: Pt reports living with son on first story of two story home at baseline; independent at baseline and denies use of DME; is employed as [de-identified]; drives; denies fall hx (son assisted with some social hx questions)  Personal factors and/or comorbidities impacting plan of care: arthritis, anemia, CA, neuropathy     Home Situation  Home Environment: Private residence  # Steps to Enter: 5  Rails to Enter: Yes  Hand Rails : Bilateral  One/Two Story Residence: Two story, live on 1st floor  Living Alone: No (lives with son)  Support Systems: Family member(s)  Patient Expects to be Discharged to[de-identified] Unknown  Current DME Used/Available at Home: Other (comment)  Tub or Shower Type: Tub/Shower combination     EXAMINATION/PRESENTATION/DECISION MAKING:   Critical Behavior:  Neurologic State: Drowsy, Eyes open to voice  Orientation Level: Oriented to person  Cognition: Appropriate for age attention/concentration, Appropriate safety awareness, Follows commands, Poor safety awareness, Recognition of people/places     Range Of Motion:  AROM: Generally decreased, functional     Strength:    Strength: Generally decreased, functional     Tone & Sensation:   Tone: Normal  Sensation: Intact     Coordination:  Coordination: Within functional limits     Functional Mobility:  Bed Mobility:  Supine to Sit: Moderate assistance;Minimum assistance;Assist x2 (for trunk support)  Sit to Supine: Maximum assistance;Assist x2 (for BLE management and trunk support)  Scooting: Minimum assistance     Balance:   Sitting: Impaired  Sitting - Static: Fair (occasional)  Sitting - Dynamic: Fair (occasional)     Functional Measure:  Barthel Index:      Bathin  Bladder: 0  Bowels: 5  Groomin  Dressin  Feedin  Mobility: 0  Stairs: 0  Toilet Use: 0  Transfer (Bed to Chair and Back): 0  Total: 10         Barthel and G-code impairment scale:  Percentage of impairment CH  0% CI  1-19% CJ  20-39% CK  40-59% CL  60-79% CM  80-99% CN  100%   Barthel Score 0-100 100 99-80 79-60 59-40 20-39 1-19    0   Barthel Score 0-20 20 17-19 13-16 9-12 5-8 1-4 0      The Barthel ADL Index: Guidelines  1. The index should be used as a record of what a patient does, not as a record of what a patient could do. 2. The main aim is to establish degree of independence from any help, physical or verbal, however minor and for whatever reason. 3. The need for supervision renders the patient not independent. 4. A patient's performance should be established using the best available evidence. Asking the patient, friends/relatives and nurses are the usual sources, but direct observation and common sense are also important. However direct testing is not needed. 5. Usually the patient's performance over the preceding 24-48 hours is important, but occasionally longer periods will be relevant. 6. Middle categories imply that the patient supplies over 50 per cent of the effort. 7. Use of aids to be independent is allowed. Latoya Fonseca., Barthel, DCadenW. (2111). Functional evaluation: the Barthel Index. [facility] (14)2. STEPHANIE Lynn, Dat Jimenez., Severino Keita., [place], [address] [place] [address] (). Measuring the change indisability after inpatient rehabilitation; comparison of the responsiveness of the Barthel Index and Functional [place]ston Measure. Journal of Neurology, Neurosurgery, and Psychiatry, 66(4), 796-382. CLAIRE Booker.WILLY, MARIELA Grant, & Ayala Dixon M.A. (2004.) Assessment of post-stroke quality of life in cost-effectiveness studies: The usefulness of the Barthel Index and the EuroQoL-5D.  Quality of Life Research, 13, 307-26            G codes:  In compliance with CMSs Claims Based Outcome Reporting, the following G-code set was chosen for this patient based on their primary functional limitation being treated: The outcome measure chosen to determine the severity of the functional limitation was the Barthel Index with a score of 10/100 which was correlated with the impairment scale. · Mobility - Walking and Moving Around:               - CURRENT STATUS:           CM - 80%-99% impaired, limited or restricted               - GOAL STATUS:       CJ - 20%-39% impaired, limited or restricted               - D/C STATUS:                       ---------------To be determined---------------      Physical Therapy Evaluation Charge Determination   History Examination Presentation Decision-Making   HIGH Complexity :3+ comorbidities / personal factors will impact the outcome/ POC  HIGH Complexity : 4+ Standardized tests and measures addressing body structure, function, activity limitation and / or participation in recreation  MEDIUM Complexity : Evolving with changing characteristics  Other outcome measures Barthel Index  HIGH       Based on the above components, the patient evaluation is determined to be of the following complexity level: MEDIUM     Pain:  Pain Scale 1: Behavioral Pain Scale (BPS)  Pain Intensity 1: 6  Pain Location 1: Shoulder  Pain Orientation 1: Left  Pain Description 1: Aching  Pain Intervention(s) 1: Medication (see MAR); Repositioned  Activity Tolerance:   Poor- Pt with visible decline in sitting and requesting to lie back down. Pt denied pain and VSS on ventilator (A/C 12, FiO2 40%, PEEP 5).       After treatment:   [ ]         Patient left in no apparent distress sitting up in chair  [X]         Patient left in no apparent distress in bed  [X]         Call bell left within reach  [X]         Nursing notified  [X]         Caregiver present  [ ]         Bed alarm activated      COMMUNICATION/EDUCATION:   The patients plan of care was discussed with: Physical Therapist, Occupational Therapist and Registered Nurse.  [X]         Fall prevention education was provided and the patient/caregiver indicated understanding. [X]         Patient/family have participated as able in goal setting and plan of care. [X]         Patient/family agree to work toward stated goals and plan of care. [ ]         Patient understands intent and goals of therapy, but is neutral about his/her participation. [ ]         Patient is unable to participate in goal setting and plan of care. Thank you for this referral.  Eulogio Rosales, SPT   Time Calculation: 28 mins                             Regarding student involvement in patient care:  A student participated in this treatment session. Per CMS Medicare statements and APTA guidelines I certify that the following was true:  1. I was present and directly observed the entire session. 2. I made all skilled judgments and clinical decisions regarding care. 3. I am the practitioner responsible for assessment, treatment, and documentation.

## 2017-04-07 NOTE — PROGRESS NOTES
[facility]  Pulmonary, Critical Care, and Sleep Medicine    Name: Foster Gan MRN: [id number]   : 1945 Hospital: [facility]   Date: 2017        IMPRESSION:   · Acute respiratory failure - airway protection  · Stridor - vocal cord paralysis - ?due to metastatic tumor vs idiosyncratic reaction to chemo  · Metastatic colon cancer  · Facial rash  · Hypokalemia       PLAN:   · Ventilator support   · Replete electrolytes  · ENT evaluation ongoing - may need trach vs extubation in OR and subsequent decision about trach  · MRI to be done - ?metastases  · Steroids   · Hold doxycycline in case this was an allergic reaction (though seems less likely at this point)  · PPI  · DVT prophylaxis with SCDs only (heparin allergy)     Subjective/Interval History:   I have reviewed the flowsheet and previous days notes. The patient is unable to give any meaningful history or review of systems because the patient is:   Intubated/sedated     The patient is critically ill on:      Mechanical ventilation     Review of Systems   Unable to perform ROS: Intubated     Objective:   Vital Signs:    Visit Vitals    /69    Pulse 73    Temp 97.8 °F (36.6 °C)    Resp 16    Wt 75 kg (165 lb 5.5 oz)    SpO2 100%    BMI 27.51 kg/m2       O2 Device: Endotracheal tube, Ventilator   O2 Flow Rate (L/min): 10 l/min   Temp (24hrs), Av.3 °F (36.8 °C), Min:97.8 °F (36.6 °C), Max:98.8 °F (37.1 °C)       Intake/Output:   Last shift:         Last 3 shifts: 1901 - 700  In: 2534.3 [I.V.:2499.3]  Out: 3068 [Urine:2218]    Intake/Output Summary (Last 24 hours) at 17  Last data filed at 17 0700   Gross per 24 hour   Intake          1481.88 ml   Output             2253 ml   Net          -771.12 ml     Hemodynamics:   PAP:   CO:     Wedge:   CI:     CVP:    SVR:       PVR:       Ventilator Settings:  Mode Rate Tidal Volume Pressure FiO2 PEEP   Spontaneous   500 ml 5 cm H2O 40 % 5 cm H20     Peak airway pressure: 121 cm H2O    Minute ventilation: 6.3 l/min       Physical Exam   Constitutional: No distress. She is sedated and intubated. HENT:   Head: Normocephalic and atraumatic. Mouth/Throat: No oropharyngeal exudate. Eyes: No scleral icterus. Cardiovascular: Normal rate and regular rhythm. No murmur heard. Pulmonary/Chest: She is intubated. No respiratory distress. She has no wheezes. Abdominal: Soft. Bowel sounds are normal. She exhibits no distension. There is no tenderness. Musculoskeletal: She exhibits no edema. Skin: Skin is warm and dry. Rash (on face) noted.      Data:     Current Facility-Administered Medications   Medication Dose Route Frequency    famotidine (PF) (PEPCID) 20 mg in sodium chloride 0.9 % 10 mL injection  20 mg IntraVENous Q12H    methylPREDNISolone (PF) (SOLU-MEDROL) injection 40 mg  40 mg IntraVENous Q8H    sodium chloride (NS) flush 5-10 mL  5-10 mL IntraVENous Q8H    0.9% sodium chloride infusion  50 mL/hr IntraVENous CONTINUOUS    propofol (DIPRIVAN) infusion  5-50 mcg/kg/min IntraVENous TITRATE                Labs:  Recent Labs      04/07/17   0642  04/06/17   0343  04/05/17   1456   WBC  11.8*  11.6*  12.9*   HGB  11.4*  12.4  14.2   HCT  35.8  38.5  42.5   PLT  106*  104*  168     Recent Labs      04/07/17   0642  04/06/17   0343  04/05/17   1456  04/05/17   0925   NA  147*  144  142   --    K  3.7  3.0*  2.7*  2.8*   CL  113*  106  102   --    CO2  25  29  26   --    GLU  93  149*  160*   --    BUN  10  6  3*   --    CREA  0.51*  0.57  0.65   --    CA  7.9*  8.7  9.2   --    MG  2.1  1.5*   --   1.7   PHOS  2.2*  2.2*   --    --    ALB   --   2.9*  3.4*   --    TBILI   --   0.8  0.5   --    SGOT   --   81*  62*   --    ALT   --   52  44   --    INR   --    --   1.2*   --      Recent Labs      04/07/17   0600  04/06/17   0622  04/05/17   1755   PH  7.49*  7.52*  7.55*   PCO2  36  38  32*   PO2  175*  177*  142*   HCO3  26 30*  27*   FIO2  40  40  60       Imaging:  I have personally reviewed the patients radiographs and have reviewed the reports:  No change, no acute        Total critical care time exclusive of procedures: 20 minutes  Vinay Hernandez MD

## 2017-04-07 NOTE — PROGRESS NOTES
Problem: Self Care Deficits Care Plan (Adult)  Goal: *Acute Goals and Plan of Care (Insert Text)  Occupational Therapy Goals  Initiated 4/7/2017  1. Patient will perform supine to sit seated EOB with minimal assistance in preparation for adls within 7 day(s). 2. Patient will perform upper body bathing with moderate assistance within 7 day(s). 3. Patient will perform upper body dressing with moderate assistance within 7 day(s). 4. Patient will perform functional transfers with moderate assistance within 7 day(s). 5. Patient will perform all aspects of toileting with maximal assistance within 7 day(s). 6. Patient will participate in upper extremity therapeutic exercise/activities with minimal assistance/contact guard assist for 10 minutes within 7 day(s). OCCUPATIONAL THERAPY EVALUATION  Patient: Jeanne Flores (44 y.o. female)  Date: 4/7/2017  Primary Diagnosis: Emergent Intabation Needed  Respiratory failure ([facility] [address]  Procedure(s) (LRB):  EMERGENT CONTROLLED INTABATION (N/A) 2 Days Post-Op   Precautions:   Fall      ASSESSMENT :  Based on the objective data described below, the patient presents with admission for emergent intubation due to respiratory failure during chemotherapy treatment requiring intubation, decreased balance, generally decreased strength and endurance impairing adls and mobility. Pt is generally dependent for self care and mobility at this time, requiring increased assistance due to intubation and line/tubes. Pt has adequate ROM and appears to understand commands, etc.  She was provided with pen and paper for communication at the end of tx session, but she'd fallen asleep and this could not be assessed at this time. Her supportive son was present during tx session and provided background information. Pt may need inpatient rehab at discharge. Her son reports that she's \"been independent since she was born\"  Will follow to determine discharge needs. .     Patient will benefit from skilled intervention to address the above impairments. Patients rehabilitation potential is considered to be Good/fair  Factors which may influence rehabilitation potential include:   [ ]             None noted  [ ]             Mental ability/status  [X]             Medical condition  [ ]             Home/family situation and support systems  [ ]             Safety awareness  [ ]             Pain tolerance/management  [ ]             Other:        PLAN :  Recommendations and Planned Interventions:  [X]               Self Care Training                  [X]        Therapeutic Activities  [X]               Functional Mobility Training    [ ]        Cognitive Retraining  [X]               Therapeutic Exercises           [X]        Endurance Activities  [X]               Balance Training                   [X]        Neuromuscular Re-Education  [X]               Visual/Perceptual Training     [X]   Home Safety Training  [X]               Patient Education                 [X]        Family Training/Education  [ ]               Other (comment):     Frequency/Duration: Patient will be followed by occupational therapy 4 times a week to address goals. Discharge Recommendations: Rehab--to be determined  Further Equipment Recommendations for Discharge: tbd           SUBJECTIVE:   Patient is intubated, nodded appropriately to yes/no questions      OBJECTIVE DATA SUMMARY:   HISTORY:   Past Medical History:   Diagnosis Date    Anemia 5-3-13     Hgb 7.4         received transfuion of two units    Anemia NEC      Arthritis       mild- low back    Cancer (HCC)       colon cancer    GERD (gastroesophageal reflux disease)       at times    Liver disease       spots on liver    Neuropathy      Thyroid disease       nodules     Past Surgical History:   Procedure Laterality Date    ABDOMEN SURGERY PROC UNLISTED   1/21/16     lap resection of abdominal mass with ileocolic/small bowel resection.     COLONOSCOPY N/A 11/11/2016 COL[facility] Jim 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  She lives with her son, who works. Expanded or extensive additional review of patient history:   Dx of metastatic colon CA  Home Situation  Home Environment: Private residence  # Steps to Enter: 5  Rails to Enter: Yes  Hand Rails : Bilateral  One/Two Story Residence: Two story, live on 1st floor  Living Alone: No (lives with son)  Support Systems: Family member(s)  Patient Expects to be Discharged to[de-identified] Unknown  Current DME Used/Available at Home: Other (comment)  Tub or Shower Type: Tub/Shower combination  [X]  Right hand dominant             [ ]  Left hand dominant     EXAMINATION OF PERFORMANCE DEFICITS:  Cognitive/Behavioral Status:  Neurologic State: Drowsy (sleeping immediately post tx session)  Orientation Level: Unable to verbalize  Cognition:  (appears appropriate)        Safety/Judgement:  (unable to assess at this time)     Skin: lines/leads, reddness     Edema: BUEs, hands  (positioned on pillows and elevated)  Encouraged B hand exercises  Hearing:   Auditory  Auditory Impairment: None     Vision/Perceptual:    Tracking: Able to track stimulus in all quadrants w/o difficulty                      Acuity:  (unable to assess accurately at this time)    Corrective Lenses:  (none present)     Range of Motion:  BUEs:  AROM: Generally decreased, functional                          Strength:  BUEs:  Strength: Generally decreased, functional      functional for bed mobility           Coordination:  Coordination: Within functional limits  Fine Motor Skills-Upper: Left Impaired;Right Impaired (due to edema)    Gross Motor Skills-Upper: Left Intact; Right Intact (pt is restrained to protect herself while ventilated)     Tone & Sensation:     Tone: Normal  Sensation: Intact                       Balance:  Sitting: Impaired  Sitting - Static: Fair (occasional)  Sitting - Dynamic: Fair (occasional)  Standing:  (not assessed due to pt not feeling well in sitting EOB and was returned to supine--pt assisting with bed mobiltiy)     Functional Mobility and Transfers for ADLs:  Bed Mobility:  Supine to Sit: Moderate assistance;Minimum assistance;Assist x2 (for trunk support)  Sit to Supine: Maximum assistance;Assist x2 (for BLE management and trunk support)  Scooting: Minimum assistance     Transfers:  Sit to Stand:  (did not assess this date, pt feeling poorly seated EOB)     ADL Assessment:  Feeding:  (npo)     Oral Facial Hygiene/Grooming: Maximum assistance     Bathing: Maximum assistance     Upper Body Dressing: Total assistance     Lower Body Dressing: Total assistance     Toileting: Total assistance (lopez catheter)                 ADL Intervention and task modifications:   Pt performed bed mobility with min to maximal assistance. Encouraged pt to perform exercises at bed level, bridging, BUE and LE arom, hand squeezes. Pt nodded in understanidng. Limited tolerance for sitting EOB, pt not feeling well and returned to supine. Provided pen and paper for pt to communicate her needs with, however upon returning with the supplies pt had fallen asleep. Gave pen and paper to son                                         Cognitive Retraining  Safety/Judgement:  (unable to assess at this time)     Therapeutic Exercise:  Encouraged bed level ankle pumps, bridging, BUE exercises including hand.   Positioned BUEs in elevation   Functional Measure:  Barthel Index:      Bathin  Bladder: 0  Bowels: 5  Groomin  Dressin  Feedin  Mobility: 0  Stairs: 0  Toilet Use: 0  Transfer (Bed to Chair and Back): 0  Total: 10         Barthel and G-code impairment scale:  Percentage of impairment CH  0% CI  1-19% CJ  20-39% CK  40-59% CL  60-79% CM  80-99% CN  100%   Barthel Score 0-100 100 99-80 79-60 59-40 20-39 1-19    0   Barthel Score 0-20 20 17-19 13-16 9-12 5-8 1-4 0      The Barthel ADL Index: Guidelines  1. The index should be used as a record of what a patient does, not as a record of what a patient could do. 2. The main aim is to establish degree of independence from any help, physical or verbal, however minor and for whatever reason. 3. The need for supervision renders the patient not independent. 4. A patient's performance should be established using the best available evidence. Asking the patient, friends/relatives and nurses are the usual sources, but direct observation and common sense are also important. However direct testing is not needed. 5. Usually the patient's performance over the preceding 24-48 hours is important, but occasionally longer periods will be relevant. 6. Middle categories imply that the patient supplies over 50 per cent of the effort. 7. Use of aids to be independent is allowed. Uriah Acosta., Barthel, D.W. (1262). Functional evaluation: the Barthel Index. [address] [facility] (14)2. Ashley Grimes, STEVIEJJAVIERF, Massimo Cloud., Wilberto Mayen., [place], [address] [place] [address] (1999). Measuring the change indisability after inpatient rehabilitation; comparison of the responsiveness of the Barthel Index and Functional [place] Measure. Journal of Neurology, Neurosurgery, and Psychiatry, 66(4), 381-202. Queen Olinda, N.J.A, MARIELA Grant, & Lenny Robin M.A. (2004.) Assessment of post-stroke quality of life in cost-effectiveness studies: The usefulness of the Barthel Index and the EuroQoL-5D. Quality of Life Research, 13, 834-05         G codes: In compliance with CMSs Claims Based Outcome Reporting, the following G-code set was chosen for this patient based on their primary functional limitation being treated:      The outcome measure chosen to determine the severity of the functional limitation was the Barthel Index with a score of 10/100 which was correlated with the impairment scale. · Self Care:               - CURRENT STATUS:    CM - 80%-99% impaired, limited or restricted               - GOAL STATUS:           CL - 60%-79% impaired, limited or restricted               - D/C STATUS:                       ---------------To be determined---------------      Occupational Therapy Evaluation Charge Determination   History Examination Decision-Making   LOW Complexity : Brief history review  MEDIUM Complexity : 3-5 performance deficits relating to physical, cognitive , or psychosocial skils that result in activity limitations and / or participation restrictions MEDIUM Complexity : Patient may present with comorbidities that affect occupational performnce. Miniml to moderate modification of tasks or assistance (eg, physical or verbal ) with assesment(s) is necessary to enable patient to complete evaluation       Based on the above components, the patient evaluation is determined to be of the following complexity level: LOW   Pain:  Pain Scale 1: Behavioral Pain Scale (BPS)  Pain Intensity 1: pt did not rate her pain  Pain Location 1: Shoulder  Pain Orientation 1: Left  Pain Description 1: Aching  Pain Intervention(s) 1: Medication (see MAR); Repositioned  Activity Tolerance:   Fair, pt did not feel well in sitting. Vital signs monitored  Please refer to the flowsheet for vital signs taken during this treatment. After treatment:   [ ] Patient left in no apparent distress sitting up in chair  [X] Patient left in no apparent distress in bed  [X] Call bell left within reach  [X] Nursing notified  [X] Caregiver present  [ ] Bed alarm activated      COMMUNICATION/EDUCATION:   The patients plan of care was discussed with: Physical Therapist and Registered Nurse.   [ ] Home safety education was provided and the patient/caregiver indicated understanding. [X] Patient/family have participated as able in goal setting and plan of care. [ ] Patient/family agree to work toward stated goals and plan of care. [ ] Patient understands intent and goals of therapy, but is neutral about his/her participation. [X] Patient is unable to participate in goal setting and plan of care. This patients plan of care is appropriate for delegation to [facility].      Thank you for this referral.  Blayne Subramanian, OTR/L  Time Calculation: 28 mins

## 2017-04-07 NOTE — PROGRESS NOTES
Care Management:    Admitted with resp failure and emergently intubated . She is currently vented in ICU and her son is at her bedside. She was getting chemo in oupt setting when she became distressed and was taken to Ed where she was intubated. She has a hx of metastatic colon cancer and Dr Stephanie Palencia is her oncologist and she has been receiving chemo . She lives with her son Rayna Kahn and is independent with ADLs. She uses [employer] in [place] or [address] [place] [address] [employer] [facility] in [place] . Her PCP is Dr Cristhian Lynn. Care Management Interventions  PCP Verified by CM: Yes  Mode of Transport at Discharge: Other (see comment) (son Rayna Kahn)  Physical Therapy Consult: Yes  Current Support Network: Other (Lives with her son in a two story home but she is on the first floor. She is active including working and driving.  Son says she has done well at home up until this hospitalization. )  Confirm Follow Up Transport: Family  Plan discussed with Pt/Family/Caregiver: Yes (I met with son Rayna Kahn at bedside. )     Krzysztof Burns [facility] acm [phone]

## 2017-04-07 NOTE — PROGRESS NOTES
Otolaryngology, Head and Neck Surgery        The patient has had no events.   MRI completed this am, normal per report      Hospital Problems  Date Reviewed: 4/5/2017          Codes Class Noted POA    Respiratory failure [facility]) ICD-10-CM: J96.90  ICD-9-CM: 518.81  4/5/2017 Unknown        History of UTI ICD-10-CM: Z87.440  ICD-9-CM: V13.02  3/15/2017 Yes        Metastatic colon cancer in female [facility]) ICD-10-CM: C78.5  ICD-9-CM: 197.5  3/31/2016 Yes        Colon cancer ([facility] [address] ICD-10-CM: C18.9  ICD-9-CM: 153.9  5/24/2013 Yes              Current Facility-Administered Medications   Medication Dose Route Frequency    chlorhexidine (PERIDEX) 0.12 % mouthwash 15 mL  15 mL Oral Q12H    famotidine (PF) (PEPCID) 20 mg in sodium chloride 0.9 % 10 mL injection  20 mg IntraVENous Q12H    sodium chloride (NS) flush 5-10 mL  5-10 mL IntraVENous Q8H    sodium chloride (NS) flush 5-10 mL  5-10 mL IntraVENous PRN    0.9% sodium chloride infusion  50 mL/hr IntraVENous CONTINUOUS    ondansetron (ZOFRAN) injection 4 mg  4 mg IntraVENous Q4H PRN    propofol (DIPRIVAN) infusion  5-50 mcg/kg/min IntraVENous TITRATE    fentaNYL citrate (PF) injection 50 mcg  50 mcg IntraVENous Q4H PRN       Recent Results (from the past 24 hour(s))   BLOOD GAS, ARTERIAL    Collection Time: 04/07/17  6:00 AM   Result Value Ref Range    pH 7.49 (H) 7.35 - 7.45      PCO2 36 35.0 - 45.0 mmHg    PO2 175 (H) 80 - 100 mmHg    O2 SAT 99 (H) 92 - 97 %    BICARBONATE 26 22 - 26 mmol/L    BASE EXCESS 3.1 mmol/L    O2 METHOD VENTILATOR      FIO2 40 %    MODE A/C      Tidal volume 500      SET RATE 12      EPAP/CPAP/PEEP 5.0      Sample source ARTERIAL      SITE LEFT RADIAL      ANGELA'S TEST YES     CBC W/O DIFF    Collection Time: 04/07/17  6:42 AM   Result Value Ref Range    WBC 11.8 (H) 3.6 - 11.0 K/uL    RBC 3.91 3.80 - 5.20 M/uL    HGB 11.4 (L) 11.5 - 16.0 g/dL    HCT 35.8 35.0 - 47.0 %    MCV 91.6 80.0 - 99.0 FL    MCH 29.2 26.0 - 34.0 PG    MCHC 31.8 30.0 - 36.5 g/dL    RDW 18.1 (H) 11.5 - 14.5 %    PLATELET 739 (L) 688 - 211 K/uL   METABOLIC PANEL, BASIC    Collection Time: 04/07/17  6:42 AM   Result Value Ref Range    Sodium 147 (H) 136 - 145 mmol/L    Potassium 3.7 3.5 - 5.1 mmol/L    Chloride 113 (H) 97 - 108 mmol/L    CO2 25 21 - 32 mmol/L    Anion gap 9 5 - 15 mmol/L    Glucose 93 65 - 100 mg/dL    BUN 10 6 - 20 MG/DL    Creatinine 0.51 (L) 0.55 - 1.02 MG/DL    BUN/Creatinine ratio 20 12 - 20      GFR est AA >60 >60 ml/min/1.73m2    GFR est non-AA >60 >60 ml/min/1.73m2    Calcium 7.9 (L) 8.5 - 10.1 MG/DL   MAGNESIUM    Collection Time: 04/07/17  6:42 AM   Result Value Ref Range    Magnesium 2.1 1.6 - 2.4 mg/dL   PHOSPHORUS    Collection Time: 04/07/17  6:42 AM   Result Value Ref Range    Phosphorus 2.2 (L) 2.6 - 4.7 MG/DL           Visit Vitals    /79    Pulse 77    Temp 98 °F (36.7 °C)    Resp 19    Wt 75 kg (165 lb 5.5 oz)    SpO2 97%    BMI 27.51 kg/m2       Intake/Output Summary (Last 24 hours) at 04/07/17 1310  Last data filed at 04/07/17 1200   Gross per 24 hour   Intake          1159.61 ml   Output             2590 ml   Net         -1430.39 ml       The patient is resting in bed, intubated, on vent  Neck soft, no masses    A:   Hospital Problems  Date Reviewed: 4/5/2017          Codes Class Noted POA    Res[facility] failure ([facility] ICD-10-CM: J96.90  ICD-9-CM: 518.81  4/5/2017 Unknown        History of UTI ICD-10-CM: Z87.440  ICD-9-CM: V13.02  3/15/2017 Yes        Metastatic colon cancer in female [facility]) ICD-10-CM: C78.5  ICD-9-CM: 197.5  3/31/2016 Yes        Colon cancer ([facility] ICD-10-CM: C18.9  ICD-9-CM: 153.9  5/24/2013 Yes                  Plan:  MRI normal, Dr. Augusta Murrieta in touch with family. Plan extubation in OR with possible trach this weekend or next.

## 2017-04-07 NOTE — PROGRESS NOTES
Bedside and Verbal shift change report given to Krissy Paris RN (oncoming nurse) by Anali Butler RN (offgoing nurse). Report included the following information SBAR, Kardex, Procedure Summary, Intake/Output, MAR, Recent Results, Med Rec Status and Cardiac Rhythm NSR.

## 2017-04-07 NOTE — INTERDISCIPLINARY ROUNDS
Interdisciplinary team rounds were held 4/7/17 with the following team members:Care Management, Nursing, Nutrition, Occupational Therapy, Pastoral Care, Pharmacy, Physical Therapy, Physician, Respiratory Therapy and Clinical Coordinator. Plan of care discussed. Goal: See MD orders and progress notes for further  interventions and desired outcomes.

## 2017-04-08 NOTE — PROGRESS NOTES
70year old with respiratory failure requiring intubation for tcv paralysis. Options discussed with patient, family. Plan for extubation in or with tracheotomy if stridor is persistent. Risks/benefits/imponderables discussed with patient, family. All questions answered.

## 2017-04-08 NOTE — PROGRESS NOTES
Report received from [facility] at bedside. 0800  Consent received via phone from son. Assessment completed at 0750, IV infiltrated in left arm. IV d/c'd, fluids infusing via portacath. Alert, appears tired. Nods appropriately. Possible tracheostomy if patient develops stridor in OR. Dr. Shad Serra, ENT to perform extubation. 0930  Remains alert, grand daughter to wait in [address] waiting area. [place] emptied. 1010  To OR via bed with anesthesia and staff. TRANSFER - IN REPORT:    Verbal report received from Meek(name) on Sarah Dom  being received from [facility]) for routine post - op      Report consisted of patients Situation, Background, Assessment and   Recommendations(SBAR). Information from the following report(s) SBAR, Kardex, Intake/Output, MAR and Cardiac Rhythm nsr was reviewed with the receiving nurse. Opportunity for questions and clarification was provided. Assessment completed upon patients arrival to unit and care assumed. 1300  Returned to unit at [phone], CCU monitoring resumed. Voice hoarse, but adequate. See assessment.  in briefly. Tube feedings started. 1600  Resting quietly, using pen and paper to communicate with family most of the time due to severe hoarseness. Denies pain or discomfort. Turned per request.    1900  Report given to Macey Mena RN.

## 2017-04-08 NOTE — PERIOP NOTES
Handoff Report from Operating Room to PACU    Report received from Cecelia Mercado and Lisa Kiser CRNA regarding Fely Glass. Surgeon(s):  Aristeo Green MD  And Procedure(s) (LRB):  EXTUBATION AND LARYNGOSOCOPY (N/A)  confirmed   with allergies and drains discussed. Anesthesia type, drugs, patient history, complications, estimated blood loss, vital signs, intake and output, and last pain medication were reviewed.

## 2017-04-08 NOTE — PERIOP NOTES
TRANSFER - OUT REPORT:    Verbal [facility] [place] [place]  being transferred to 2522(unit) for routine progression of care       Report consisted of patients Situation, Background, Assessment and   Recommendations(SBAR). Information from the following report(s) SBAR, OR Summary, Procedure Summary, Intake/Output, MAR and Cardiac Rhythm NSR was reviewed with the receiving nurse. Opportunity for questions and clarification was provided.       Patient transported with:   Monitor  O2 @ 2 liters  Registered Nurse  [facility]

## 2017-04-08 NOTE — PROGRESS NOTES
Bedside and Verbal shift change report given to Milena Mcgowan RN (oncoming nurse) by Bryan Lara RN (offgoing nurse). Report included the following information SBAR, Kardex, Procedure Summary, Intake/Output, MAR, Recent Results, Med Rec Status and Cardiac Rhythm NSR.

## 2017-04-08 NOTE — ANESTHESIA PREPROCEDURE EVALUATION
Anesthetic History     PONV          Review of Systems / Medical History  Patient summary reviewed, nursing notes reviewed and pertinent labs reviewed    Pulmonary          Smoker      Comments: Former smoker - Quit 6/14/1988 - 20 pack years   Neuro/Psych   Within defined limits           Cardiovascular  Within defined limits                Exercise tolerance: <4 METS     GI/Hepatic/Renal     GERD: well controlled      Liver disease    Comments: Metastatic recurrent colon cancer Endo/Other    Diabetes: well controlled, type 2    Blood dyscrasia, arthritis, cancer and anemia  Pertinent negatives: No hypothyroidism  Comments: Metastatic colon cancer  H/O Thyroid nodules Other Findings   Comments:   Neuropathy             Physical Exam    Airway          Intubated   Cardiovascular  Regular rate and rhythm,  S1 and S2 normal,  no murmur, click, rub, or gallop             Dental    Dentition: Full upper dentures and Poor dentition  Comments: Multiple missing lower teeth, none loose.    Pulmonary  Breath sounds clear to auscultation               Abdominal  GI exam deferred       Other Findings            Anesthetic Plan    ASA: 3  Anesthesia type: general    Monitoring Plan: BIS      Induction: Intravenous  Anesthetic plan and risks discussed with: Patient

## 2017-04-08 NOTE — PROGRESS NOTES
Bedside and Verbal shift change report given to Real Powell RN (oncoming nurse) by Estephania Moser RN (offgoing nurse). Report included the following information SBAR, Kardex, Procedure Summary, Intake/Output, MAR, Recent Results, Med Rec Status and Cardiac Rhythm NSR.     2015 In to assess patient. Niece and nephew at bedside. Patient shakes head no when asked if in pain,  NAD noted. VSS, appears comfortable on vent. 2200 Patient resting quietly in bed, turned and repositioned with mobility team every 2 hours,family remains at bedside. Will continue to monitor. 0450 Uneventful night, required no additional medication for pain, VSS. Await am procedure with Dr Danny Mayfield.

## 2017-04-08 NOTE — PROGRESS NOTES
[facility]  Pulmonary, Critical Care, and Sleep Medicine    Name: Marycarmen Rincon MRN: [id number]   : 1945 Hospital: [facility]   Date: 2017        IMPRESSION:   · Acute respiratory failure - airway protection  · Stridor - vocal cord paralysis - ?due to metastatic tumor vs idiosyncratic reaction to chemo  · Metastatic colon cancer  · Facial rash  · Hypokalemia       PLAN:   · ENT evaluation appreciated  · MRI to be done - ?metastases  · Steroids   · Hold doxycycline in case this was an allergic reaction (though seems less likely at this point)  · PPI  · DVT prophylaxis with SCDs only (heparin allergy)     Subjective/Interval History:   I have reviewed the flowsheet and previous days notes. Appreciate Dr. Wendy Sauceda help. Taken to OR in AM. Extubated and observed. No need for trach. Now back in CCU on venti mask. Soft voice but no stridor. Lungs clear. Review of Systems   Unable to perform ROS: Other   Respiratory: Negative for shortness of breath. Cardiovascular: Negative for chest pain. Gastrointestinal: Negative for nausea.      Objective:   Vital Signs:    Visit Vitals    /62    Pulse 67    Temp 97.5 °F (36.4 °C)    Resp 17    Wt 75 kg (165 lb 5.5 oz)    SpO2 99%    BMI 27.51 kg/m2       O2 Device: 02 face tent   O2 Flow Rate (L/min): 15 l/min   Temp (24hrs), Av.3 °F (36.8 °C), Min:97.5 °F (36.4 °C), Max:99.1 °F (37.3 °C)       Intake/Output:   Last shift:       07 - 1900  In: 1084.5 [I.V.:1034.5]  Out: 9718 [Urine:1255]  Last 3 shifts: 1901 -  0700  In: 2118 [I.V.:8]  Out: 3610 [Urine:3010]    Intake/Output Summary (Last 24 hours) at 17 1747  Last data filed at 17 1700   Gross per 24 hour   Intake          1913.65 ml   Output             2810 ml   Net          -896.35 ml     Hemodynamics:   PAP:   CO:     Wedge:   CI:     CVP:    SVR:       PVR:       Ventilator Settings:  Mode Rate Tidal Volume Pressure FiO2 PEEP   Assist control   500 ml  5 cm H2O 40 % 5 cm H20     Peak airway pressure: 22 cm H2O    Minute ventilation: 8.4 l/min       Physical Exam   Constitutional: No distress. She is sedated and intubated. HENT:   Head: Normocephalic and atraumatic. Mouth/Throat: No oropharyngeal exudate. Eyes: No scleral icterus. Cardiovascular: Normal rate and regular rhythm. No murmur heard. Pulmonary/Chest: She is intubated. No respiratory distress. She has no wheezes. Abdominal: Soft. Bowel sounds are normal. She exhibits no distension. There is no tenderness. Musculoskeletal: She exhibits no edema. Skin: Skin is warm and dry. Rash (on face) noted.      Data:     Current Facility-Administered Medications   Medication Dose Route Frequency    chlorhexidine (PERIDEX) 0.12 % mouthwash 15 mL  15 mL Oral Q12H    famotidine (PF) (PEPCID) 20 mg in sodium chloride 0.9 % 10 mL injection  20 mg IntraVENous Q12H    sodium chloride (NS) flush 5-10 mL  5-10 mL IntraVENous Q8H    0.9% sodium chloride infusion  50 mL/hr IntraVENous CONTINUOUS                Labs:  Recent Labs      04/08/17   0538  04/07/17   0642  04/06/17   0343   WBC  8.6  11.8*  11.6*   HGB  11.7  11.4*  12.4   HCT  35.9  35.8  38.5   PLT  118*  106*  104*     Recent Labs      04/08/17   0538  04/07/17   0642  04/06/17   0343   NA  144  147*  144   K  3.4*  3.7  3.0*   CL  109*  113*  106   CO2  23  25  29   GLU  80  93  149*   BUN  9  10  6   CREA  0.48*  0.51*  0.57   CA  8.3*  7.9*  8.7   MG  2.1  2.1  1.5*   PHOS  2.1*  2.2*  2.2*   ALB   --    --   2.9*   TBILI   --    --   0.8   SGOT   --    --   81*   ALT   --    --   52     Recent Labs      04/08/17   1205  04/08/17   0629  04/07/17   0600   PH  7.44  7.43  7.49*   PCO2  39  36  36   PO2  206*  186*  175*   HCO3  26  23  26   FIO2  40  40  40       Imaging:  I have personally reviewed the patients radiographs and have reviewed the reports:  No change, no acute        Total critical care time exclusive of procedures: 20 minutes  Aurora Jha MD

## 2017-04-08 NOTE — PROGRESS NOTES
1900: Received report from [facility]. 2000: Assessment complete. Patient is extremely hoarse but alert and oriented. Family bedside. Lungs are clear and diminished in the bases. Fact tent at 40%. NG 59cm. TF 10ml infusing. 80ml green residual. Bowel sounds hypoactive. Denies pain at this time. 0000: Reassessment complete. Patient resting comfortably. Increased TF to 20ml/hr. 100ml flush. Residual 90ml  0100: Patient complaining of nasal congestion on the side of her NG tube and the back of her throat being sore. Face tent pulling on NG tube and causing discomfort. Removed face tent sats 98% will continue to monitor. 0400: Reassessment complete. No changes. Patient resting comfortably. 0545: Removed sudhir lopez in place. Educated patient on device. 0490: Hospitalist paged. 2248: Dr. Yi Hickman returned call. Updated on latest lab results, K- 3.1. Orders received for 2 runs of K 20mEq in 50ml  0725: Bedside and Verbal shift change report [facility]on[facility] nurse) by Zakia Monet  (offgoing nurse). Report included the following information SBAR, Kardex, ED Summary and Accordion.

## 2017-04-08 NOTE — OP NOTES
OPERATIVE NOTE    Date of Procedure: 4/8/2017   Preoperative Diagnosis: PARALYZED VOCAL CORD  Postoperative Diagnosis: PARALYZED VOCAL CORD    Procedure(s):  EXTUBATION AND flexible LARYNGOSOCOPY  Surgeon(s) and Role:     * Bandar Luther MD - Primary          Patient was brought to the operating room and placed in position. The patient was extubated by anesthesia, with no stridor noted. Patient continued to sat well with observation. After topical anesthesia, a flexible laryngoscope was introduced to the right and left nostril. The patient had substantial secretions that were cleared. The airway had minimal laryngeal edema, substantial arytenoid edema posterior to airway. Both tvc demonstrated motion. Difficult exam due to patient movement, secretions. No aspiration noted. Patient saturated at 96% on room air. Patient was taken to pacu in guarded condition.      Surgical Staff:  Circ-1: Mary Carmen Brown RN  Scrub Tech-1: Key Carrasco  Surg Asst-1: Emeterio Landa  Event Time In   Incision Start 1026   Incision Close 1050     Anesthesia: Topical   Estimated Blood Loss: 0cc  Specimens: * No specimens in log *   Findings: improved airway, bilateral tvc motion   Complications: none  Implants: * No implants in log *

## 2017-04-08 NOTE — PROGRESS NOTES
Hospitalist Progress Note    NAME: Re Arguello   :  1945   MRN:  [id number]       Interim Hospital Summary: 70 y.o. female whom presented on 2017 with      Assessment / Plan:    Acute respiratory failure: POA  Suspect Secondary to Chemo related neurotoxicity (oxaliplatin) vs allergic reaction causing anaphylaxis because pt has stridor on presenetation  PE was not considered as differential   Improving, awake  On IV steroids/pepcid, Currently on vent, intensivist/ENT on board, plan to do extubation in OR by ENT       Metastatic colon cancer, history of surgeries on chemo  Follows with dr. Uriel Zamora  On chemo as outpt   MRI brain-No mets or acute pathology    ? Rash on the face vectibix related -- added doxy yesterday ( was also to cover uti? ??)  Oncology follow up, doxy on hold per intensivist     Hypokalemia:   replace      Code Status: full  Surrogate Decision Maker:     DVT Prophylaxis:scds ( need to confirm allergies to heparin)  GI Prophylaxis: not indicated     Baseline: independent     Spoke with the daughter and updated         Subjective:     Chief Complaint / Reason for Physician Visit  \"intubated\". Discussed with RN events overnight. Review of Systems:  Symptom Y/N Comments  Symptom Y/N Comments   Fever/Chills    Chest Pain     Poor Appetite    Edema     Cough    Abdominal Pain     Sputum    Joint Pain     SOB/SEVILLA    Pruritis/Rash     Nausea/vomit    Tolerating PT/OT     Diarrhea    Tolerating Diet     Constipation    Other       Could NOT obtain due to: Intubated and sedated     Objective:     VITALS:   Last 24hrs VS reviewed since prior progress note.  Most recent are:  Patient Vitals for the past 24 hrs:   Temp Pulse Resp BP SpO2   17 1330 - 82 15 - 99 %   17 1300 - 72 18 131/73 99 %   17 1245 - 83 17 - 99 %   17 1241 98.5 °F (36.9 °C) 84 18 - 96 %   17 1237 - - - 118/69 -   17 1226 - 89 21 - 99 %   17 1215 - 81 30 132/71 98 % 04/08/17 1204 98.1 °F (36.7 °C) 84 27 - 98 %   04/08/17 1200 - 82 18 125/74 98 %   04/08/17 1156 - 82 16 130/71 98 %   04/08/17 1145 - 84 15 - 98 %   04/08/17 1140 - 85 17 - 98 %   04/08/17 1135 - 85 15 133/73 98 %   04/08/17 1130 - 85 26 132/73 98 %   04/08/17 1125 - 85 16 135/74 98 %   04/08/17 1120 - 86 17 135/73 98 %   04/08/17 1115 - - - 144/78 99 %   04/08/17 1110 - 88 24 131/71 96 %   04/08/17 1109 - 88 19 130/71 99 %   04/08/17 1106 - 88 21 - 99 %   04/08/17 1104 98.3 °F (36.8 °C) 91 14 137/73 100 %   04/08/17 1100 - - - 137/73 95 %   04/08/17 1000 - 75 16 140/78 100 %   04/08/17 0900 - 75 18 129/80 99 %   04/08/17 0829 - 80 18 - 99 %   04/08/17 0750 99.1 °F (37.3 °C) - - - -   04/08/17 0700 - 71 14 130/70 99 %   04/08/17 0615 - 69 16 - 99 %   04/08/17 0605 - 71 14 - 100 %   04/08/17 0600 - 69 15 128/77 100 %   04/08/17 0550 - 71 14 - 99 %   04/08/17 0500 - 63 22 121/69 100 %   04/08/17 0400 98.4 °F (36.9 °C) 71 12 117/64 99 %   04/08/17 0354 - 66 20 - 99 %   04/08/17 0300 - 69 22 115/63 99 %   04/08/17 0200 - 64 12 125/67 99 %   04/08/17 0100 - 65 12 115/68 99 %   04/08/17 0000 - 63 12 115/68 99 %   04/07/17 2349 - 63 12 - 100 %   04/07/17 2300 98.5 °F (36.9 °C) 64 12 115/66 99 %   04/07/17 2200 - 68 12 116/64 99 %   04/07/17 2100 - 72 12 107/60 100 %   04/07/17 2012 - 73 12 - 99 %   04/07/17 2000 - 78 16 122/67 100 %   04/07/17 1900 - 75 12 124/67 100 %   04/07/17 1800 - 67 12 112/63 99 %   04/07/17 1700 - 73 22 105/69 100 %   04/07/17 1627 - 71 12 - 100 %   04/07/17 1600 99.2 °F (37.3 °C) 72 12 109/60 97 %   04/07/17 1500 - 69 12 110/62 96 %   04/07/17 1400 - 66 19 118/68 97 %       Intake/Output Summary (Last 24 hours) at 04/08/17 1342  Last data filed at 04/08/17 1300   Gross per 24 hour   Intake          1916.45 ml   Output             2595 ml   Net          -678.55 ml        PHYSICAL EXAM:  General: intubated, no acute distress    EENT:  EOMI. Anicteric sclerae.  MMM  Resp:  CTA bilaterally, no wheezing or rales. No accessory muscle use  CV:  Regular  rhythm,  No edema  GI:  Soft, Non distended, Non tender.  +Bowel sounds  Neurologic:  intubated and sedated  Psych:   Not anxious nor agitated  Skin:  Facial rash    Reviewed most current lab test results and cultures  YES  Reviewed most current radiology test results   YES  Review and summation of old records today    NO  Reviewed patient's current orders and MAR    YES  PMH/SH reviewed - no change compared to H&P  ________________________________________________________________________  Care Plan discussed with:    Comments   Patient     Family  x    RN x    Care Manager     Consultant                        Multidiciplinary team rounds were held today with , nursing, pharmacist and clinical coordinator. Patient's plan of care was discussed; medications were reviewed and discharge planning was addressed. ________________________________________________________________________  Total NON critical care TIME:  35   Minutes    Total CRITICAL CARE TIME Spent:   Minutes non procedure based      Comments   >50% of visit spent in counseling and coordination of care     ________________________________________________________________________  Jacki Dumont MD     Procedures: see electronic medical records for all procedures/Xrays and details which were not copied into this note but were reviewed prior to creation of Plan. LABS:  I reviewed today's most current labs and imaging studies.   Pertinent labs include:  Recent Labs      04/08/17   0538  04/07/17   0642  04/06/17   0343   WBC  8.6  11.8*  11.6*   HGB  11.7  11.4*  12.4   HCT  35.9  35.8  38.5   PLT  118*  106*  104*     Recent Labs      04/08/17   0538  04/07/17   0642  04/06/17   0343  04/05/17   1456   NA  144  147*  144  142   K  3.4*  3.7  3.0*  2.7*   CL  109*  113*  106  102   CO2  23  25  29  26   GLU  80  93  149*  160*   BUN  9  10  6  3*   CREA  0.48*  0.51*  0.57  0.65   CA 8. 3*  7.9*  8.7  9.2   MG  2.1  2.1  1.5*   --    PHOS  2.1*  2.2*  2.2*   --    ALB   --    --   2.9*  3.4*   TBILI   --    --   0.8  0.5   SGOT   --    --   81*  62*   ALT   --    --   52  44   INR   --    --    --   1.2*       Signed: Brandy Hernandez MD

## 2017-04-08 NOTE — BRIEF OP NOTE
BRIEF OPERATIVE NOTE    Date of Procedure: 4/8/2017   Preoperative Diagnosis: PARALYZED VOCAL CORD  Postoperative Diagnosis: PARALYZED VOCAL CORD    Procedure(s):  EXTUBATION AND flexible LARYNGOSOCOPY  Surgeon(s) and Role:     * Baron Jyoti MD - Primary          Patient was brought to the operating room and placed in position. The patient was extubated by anesthesia, with no stridor noted. Patient continued to sat well with observation. After topical anesthesia, a flexible laryngoscope was introduced to the right and left nostril. The patient had substantial secretions that were cleared. The airway had minimal laryngeal edema, substantial arytenoid edema posterior to airway. Both tvc demonstrated motion. Difficult exam due to patient movement, secretions. No aspiration noted. Patient saturated at 96% on room air. Patient was taken to pacu in guarded condition.      Surgical Staff:  Circ-1: Fabby Garrison RN  Scrub Tech-1: [facility]  Surg Asst-1: Wayne Miranda  Event Time In   Incision Start 1026   Incision Close 1050     Anesthesia: Topical   Estimated Blood Loss: 0cc  Specimens: * No specimens in log *   Findings: improved airway, bilateral tvc motion   Complications: none  Implants: * No implants in log *

## 2017-04-08 NOTE — PERIOP NOTES
11:00 Dr. Manuel Read requested pt to be monitored in pacu, post extubation in OR.    11:06 pt A&O x3, O2 sat 98% on face fi02 @ 50%, Dr. Morton Felt at bedside speaking with pt. Dr. Morton Felt requested for maintain face tent at fio2 40% for humidification. 11:47 Dr. Manuel Read f/u with pt, requested ABG'S     12:17 Dr. Manuel Read f/u with pt, made aware arterial blood gas results ok for pt to transfer back to floor.

## 2017-04-08 NOTE — ANESTHESIA POSTPROCEDURE EVALUATION
Post-Anesthesia Evaluation and Assessment    Patient: Leelee Izquierdo MRN: [id number]  SSN: [id number]    YOB: 1945  Age: 70 y.o. Sex: female       Cardiovascular Function/Vital Signs  Visit Vitals    /74    Pulse 84    Temp 36.7 °C (98.1 °F)    Resp 27    Wt 75 kg (165 lb 5.5 oz)    SpO2 98%    BMI 27.51 kg/m2       Patient is status post MAC anesthesia for Procedure(s):  EXTUBATION AND LARYNGOSOCOPY. Nausea/Vomiting: None    Postoperative hydration reviewed and adequate. Pain:  Pain Scale 1: Behavioral Pain Scale (BPS) (04/08/17 0750)  Pain Intensity 1: 3 (04/08/17 0750)   Managed    Neurological Status:   Neuro (WDL): Exceptions to WDL (04/1945)  Neuro  Neurologic State: Alert (04/08/17 1104)  Orientation Level: Oriented to person;Oriented to place;Oriented to situation (04/08/17 1104)  Cognition: Follows commands (04/08/17 1104)  Speech:  (voice sounds hoarse, c/o sore throat, otherwise speach clear) (04/08/17 1104)  LUE Motor Response: Purposeful (04/08/17 1104)  LLE Motor Response: Purposeful (04/08/17 1104)  RUE Motor Response: Purposeful (04/08/17 1104)  RLE Motor Response: Purposeful (04/08/17 1104)   At baseline    Mental Status and Level of Consciousness: Arousable    Pulmonary Status:   O2 Device: 02 face tent (04/08/17 1106)   Adequate oxygenation and airway patent    Complications related to anesthesia: None    Post-anesthesia assessment completed. No concerns  Blood gas noted.     Signed By: Nayeli Mari MD     April 8, 2017

## 2017-04-09 NOTE — PROGRESS NOTES
Admission skin assessment performed by RNs Katelin Rhoades and Jermaine Sal No impairments noted, skin intact.

## 2017-04-09 NOTE — PROGRESS NOTES
Otolaryngology, Head and Neck Surgery      No issues overnight. Voice improving. No dyspnea or stridor. Has had some PO, no choking/aspiration.     Current Facility-Administered Medications   Medication Dose Route Frequency    chlorhexidine (PERIDEX) 0.12 % mouthwash 15 mL  15 mL Oral Q12H    famotidine (PF) (PEPCID) 20 mg in sodium chloride 0.9 % 10 mL injection  20 mg IntraVENous Q12H    sodium chloride (NS) flush 5-10 mL  5-10 mL IntraVENous Q8H    sodium chloride (NS) flush 5-10 mL  5-10 mL IntraVENous PRN    0.9% sodium chloride infusion  50 mL/hr IntraVENous CONTINUOUS    ondansetron (ZOFRAN) injection 4 mg  4 mg IntraVENous Q4H PRN    fentaNYL citrate (PF) injection 50 mcg  50 mcg IntraVENous Q4H PRN       Recent Results (from the past 24 hour(s))   METABOLIC PANEL, BASIC    Collection Time: 04/09/17  4:28 AM   Result Value Ref Range    Sodium 142 136 - 145 mmol/L    Potassium 3.1 (L) 3.5 - 5.1 mmol/L    Chloride 106 97 - 108 mmol/L    CO2 26 21 - 32 mmol/L    Anion gap 10 5 - 15 mmol/L    Glucose 85 65 - 100 mg/dL    BUN 8 6 - 20 MG/DL    Creatinine 0.39 (L) 0.55 - 1.02 MG/DL    BUN/Creatinine ratio 21 (H) 12 - 20      GFR est AA >60 >60 ml/min/1.73m2    GFR est non-AA >60 >60 ml/min/1.73m2    Calcium 8.5 8.5 - 10.1 MG/DL   MAGNESIUM    Collection Time: 04/09/17  4:28 AM   Result Value Ref Range    Magnesium 2.1 1.6 - 2.4 mg/dL   PHOSPHORUS    Collection Time: 04/09/17  4:28 AM   Result Value Ref Range    Phosphorus 2.7 2.6 - 4.7 MG/DL   CBC W/O DIFF    Collection Time: 04/09/17  4:28 AM   Result Value Ref Range    WBC 6.8 3.6 - 11.0 K/uL    RBC 4.20 3.80 - 5.20 M/uL    HGB 12.2 11.5 - 16.0 g/dL    HCT 38.0 35.0 - 47.0 %    MCV 90.5 80.0 - 99.0 FL    MCH 29.0 26.0 - 34.0 PG    MCHC 32.1 30.0 - 36.5 g/dL    RDW 16.6 (H) 11.5 - 14.5 %    PLATELET 949 (L) 690 - 400 K/uL           Visit Vitals    BP 99/68 (BP 1 Location: Left arm, BP Patient Position: At rest)    Pulse 86    Temp 98.6 °F (37 °C)    Resp 18    Wt 75 kg (165 lb 5.5 oz)    SpO2 94%    BMI 27.51 kg/m2       Intake/Output Summary (Last 24 hours) at 04/09/17 1809  Last data filed at 04/09/17 1400   Gross per 24 hour   Intake             1970 ml   Output             1350 ml   Net              620 ml       Gen: nad, awake, alert  Voice: normal  Resp: nml effort, no stridor or stertor  Neck: flat, soft    A:   Hospital Problems  Date Reviewed: 4/8/2017          Codes Class Noted POA    Respiratory failure ([facility] ICD-10-CM: J96.90  ICD-9-CM: 518.81  4/5/2017 Unknown        History of UTI ICD-10-CM: Z87.440  ICD-9-CM: V13.02  3/15/2017 Yes        Metastatic colon cancer in female [facility]) ICD-10-CM: C78.5  ICD-9-CM: 197.5  3/31/2016 Yes        Colon cancer ([facility] ICD-10-CM: C18.9  ICD-9-CM: 153.9  5/24/2013 Yes                  Plan:  Stridor, likely secondary to chemo reaction, resolved  -advance diet as summer  -can f/u in the office after discharge

## 2017-04-09 NOTE — PROGRESS NOTES
Hospitalist Progress Note    NAME: Genna Devlin   :  1945   MRN:  [id number]       Interim Hospital Summary: 70 y.o. female whom presented on 2017 with      Assessment / Plan:    Acute respiratory failure: POA  Suspect Secondary to Chemo related neurotoxicity (oxaliplatin) vs allergic reaction causing anaphylaxis because pt has stridor on presenetation  PE was not considered as differential   S/p successful extubation   Tolerating ice chips, will advance diet as tolerated   Off steroids  intensivist/ENT on board, much appreciate     Metastatic colon cancer, history of surgeries on chemo  Follows with dr. Ketan Johnson  On chemo as outpt   MRI brain-No mets or acute pathology    ? Rash on the face vectibix related -- added doxy a day prior to admissin ( was also to cover uti???), now rash almost resolved  Oncology follow up, doxy on hold per intensivist on admission     Hypokalemia:   replace      Code Status: full  Surrogate Decision Maker:     DVT Prophylaxis:scds ( need to confirm allergies to heparin)  GI Prophylaxis: not indicated     Baseline: independent     Spoke with the daughter and updated         Subjective:     Chief Complaint / Reason for Physician Visit  \"i am doing better\". Discussed with RN events overnight. Review of Systems:  Symptom Y/N Comments  Symptom Y/N Comments   Fever/Chills n   Chest Pain n    Poor Appetite    Edema     Cough n   Abdominal Pain n    Sputum n   Joint Pain     SOB/SEVILLA n   Pruritis/Rash n    Nausea/vomit n   Tolerating PT/OT     Diarrhea n   Tolerating Diet y    Constipation    Other       Could NOT obtain due to:      Objective:     VITALS:   Last 24hrs VS reviewed since prior progress note.  Most recent are:  Patient Vitals for the past 24 hrs:   Temp Pulse Resp BP SpO2   17 0700 - 70 16 118/65 97 %   17 0600 - 65 12 123/66 97 %   17 0500 - 61 12 133/71 99 %   17 0400 97.8 °F (36.6 °C) 64 19 116/67 98 %   17 0300 - 71 15 114/68 97 %   04/09/17 0200 - 73 20 122/60 99 %   04/09/17 0100 - 71 14 131/70 98 %   04/09/17 0000 98.2 °F (36.8 °C) 63 14 137/70 100 %   04/08/17 2300 - 60 13 124/69 99 %   04/08/17 2200 - 63 14 132/70 99 %   04/08/17 2100 - 60 18 136/70 100 %   04/08/17 2000 97.2 °F (36.2 °C) 67 14 128/72 99 %   04/08/17 1900 - 60 14 122/72 99 %   04/08/17 1800 - 66 18 129/70 98 %   04/08/17 1700 - 67 17 122/62 99 %   04/08/17 1615 97.5 °F (36.4 °C) - - - -   04/08/17 1600 - 81 18 117/70 99 %   04/08/17 1500 - 66 16 125/66 99 %   04/08/17 1400 - 75 16 124/69 100 %   04/08/17 1330 - 82 15 - 99 %   04/08/17 1300 - 72 18 131/73 99 %   04/08/17 1245 - 83 17 - 99 %   04/08/17 1241 98.5 °F (36.9 °C) 84 18 - 96 %   04/08/17 1237 - - - 118/69 -   04/08/17 1226 - 89 21 - 99 %   04/08/17 1215 - 81 30 132/71 98 %   04/08/17 1204 98.1 °F (36.7 °C) 84 27 - 98 %   04/08/17 1200 - 82 18 125/74 98 %   04/08/17 1156 - 82 16 130/71 98 %   04/08/17 1145 - 84 15 - 98 %   04/08/17 1140 - 85 17 - 98 %   04/08/17 1135 - 85 15 133/73 98 %   04/08/17 1130 - 85 26 132/73 98 %   04/08/17 1125 - 85 16 135/74 98 %   04/08/17 1120 - 86 17 135/73 98 %   04/08/17 1115 - - - 144/78 99 %   04/08/17 1110 - 88 24 131/71 96 %   04/08/17 1109 - 88 19 130/71 99 %   04/08/17 1106 - 88 21 - 99 %   04/08/17 1104 98.3 °F (36.8 °C) 91 14 137/73 100 %   04/08/17 1100 - - - 137/73 95 %       Intake/Output Summary (Last 24 hours) at 04/09/17 1002  Last data filed at 04/09/17 0600   Gross per 24 hour   Intake             2200 ml   Output             2075 ml   Net              125 ml        PHYSICAL EXAM:  General: Awake and alert, no acute distress    EENT:  EOMI. Anicteric sclerae. MMM  Resp:  CTA bilaterally, no wheezing or rales.   No accessory muscle use  CV:  Regular  rhythm,  No edema  GI:  Soft, Non distended, Non tender.  +Bowel sounds  Neurologic:  Moving all extremities equally  Psych:   Not anxious nor agitated  Skin:  Facial rash, resolving    Reviewed most current lab test results and cultures  YES  Reviewed most current radiology test results   YES  Review and summation of old records today    NO  Reviewed patient's current orders and MAR    YES  PMH/SH reviewed - no change compared to H&P  ________________________________________________________________________  Care Plan discussed with:    Comments   Patient x    Family  x Grand daughter   RN x    Care Manager     Consultant                        Multidiciplinary team rounds were held today with , nursing, pharmacist and clinical coordinator. Patient's plan of care was discussed; medications were reviewed and discharge planning was addressed. ________________________________________________________________________  Total NON critical care TIME:  35   Minutes    Total CRITICAL CARE TIME Spent:   Minutes non procedure based      Comments   >50% of visit spent in counseling and coordination of care     ________________________________________________________________________  Terrell Diego MD     Procedures: see electronic medical records for all procedures/Xrays and details which were not copied into this note but were reviewed prior to creation of Plan. LABS:  I reviewed today's most current labs and imaging studies.   Pertinent labs include:  Recent Labs      04/09/17 0428 04/08/17 0538  04/07/17   0642   WBC  6.8  8.6  11.8*   HGB  12.2  11.7  11.4*   HCT  38.0  35.9  35.8   PLT  140*  118*  106*     Recent Labs      04/09/17 0428 04/08/17   0538  04/07/17   0642   NA  142  144  147*   K  3.1*  3.4*  3.7   CL  106  109*  113*   CO2  26  23  25   GLU  85  80  93   BUN  8  9  10   CREA  0.39*  0.48*  0.51*   CA  8.5  8.3*  7.9*   MG  2.1  2.1  2.1   PHOS  2.7  2.1*  2.2*       Signed: Terrell Diego MD

## 2017-04-09 NOTE — PROGRESS NOTES
TRANSFER - IN REPORT:    Verbal report received from [facility] RN(name) on Darnell Barrett  being received from CCU(unit) for routine progression of care      Report consisted of patients Situation, Background, Assessment and   Recommendations(SBAR). Information from the following report(s) SBAR, Kardex, Intake/Output, MAR and Recent Results was reviewed with the receiving nurse. Opportunity for questions and clarification was provided. Assessment completed upon patients arrival to unit and care assumed.

## 2017-04-09 NOTE — PROGRESS NOTES
Bedside and Verbal shift change report given to Luís Sarmiento RN (oncoming nurse) by Barb Kidd RN (offgoing nurse). Report included the following information SBAR, Kardex, Procedure Summary, Intake/Output, MAR, Recent Results, Med Rec Status and Cardiac Rhythm NSR.

## 2017-04-10 NOTE — ADT AUTH CERT NOTES
Respiratory Failure GRG - Care Day 4 (4/8/2017) by Sanford Knott        Review Status Review Entered       Completed 4/10/2017       Details              Care Day: 4 Care Date: 4/8/2017 Level of Care: ICU       Guideline Day 2        Level Of Care       (X) ICU or ventilator-capable area              Clinical Status       (X) * Weaning assessment performed       4/10/2017 10:50 AM EDT by Brandon Hermosillo         extubated today and on face tent                     Interventions       (X) Inpatient interventions continue       4/10/2017 10:50 AM EDT by Brandon Hermosillo         Tube feeding Consult mobility services              (X) Weaning trials attempted                                   * Milestone              Additional Notes       97.5-122/62-67-17-99% face tent 15 l/min       Date of Procedure: 4/8/2017        Preoperative Diagnosis: PARALYZED VOCAL CORD       Postoperative Diagnosis: PARALYZED VOCAL CORD        Procedure(s):       EXTUBATION AND flexible LARYNGOSOCOPY       Findings: improved airway, bilateral tvc motion       Assessment / Plan:               Acute respiratory failure: POA       Suspect Secondary to Chemo related neurotoxicity (oxaliplatin) vs allergic reaction causing anaphylaxis because pt has stridor on presenetation       PE was not considered as differential        Improving, awake       On IV steroids/pepcid, Currently on vent, intensivist/ENT on board, plan to do extubation in OR by ENT                        Metastatic colon cancer, history of surgeries on chemo       Follows with dr. Stefanie Garland       On chemo as outpt       4/7 MRI brain-No mets or acute pathology               ? Rash on the face vectibix related -- added doxy yesterday ( was also to cover uti? ??)       Oncology follow up, doxy on hold per intensivist                Hypokalemia:        replace                        Pulmonary -        IMPRESSION:       Acute respiratory failure - airway protection       Stridor - vocal cord paralysis - ?due to metastatic tumor vs idiosyncratic reaction to chemo       Metastatic colon cancer       Facial rash       Hypokalemia                PLAN:       ENT evaluation appreciated       MRI to be done - ?metastases       Steroids        Hold doxycycline in case this was an allergic reaction (though seems less likely at this point)       PPI       DVT prophylaxis with SCDs only (heparin allergy)              Plt 118 creat 0.48 phos 2.1 PO2 186        CXR - Lungs remain clear except for minor discoid atelectasis left base       Ivf 50cc/hr       Pepcid iv x2       Propofol drip

## 2017-04-10 NOTE — PROGRESS NOTES
Problem: Self Care Deficits Care Plan (Adult)  Goal: *Acute Goals and Plan of Care (Insert Text)  Occupational Therapy Goals  Initiated 4/7/2017  1. Patient will perform supine to sit seated EOB with minimal assistance in preparation for adls within 7 day(s). 2. Patient will perform upper body bathing with moderate assistance within 7 day(s). 3. Patient will perform upper body dressing with moderate assistance within 7 day(s). 4. Patient will perform functional transfers with moderate assistance within 7 day(s). 5. Patient will perform all aspects of toileting with maximal assistance within 7 day(s). 6. Patient will participate in upper extremity therapeutic exercise/activities with minimal assistance/contact guard assist for 10 minutes within 7 day(s). OCCUPATIONAL THERAPY TREATMENT/DISCHARGE  Patient: Jayne Appiah (84 y.o. female)  Date: 4/10/2017  Diagnosis: Emergent Intabation Needed  Respiratory failure ([address]  Compromise airway <principal problem not specified>  Procedure(s) (LRB):  EXTUBATION AND LARYNGOSOCOPY (N/A) 2 Days Post-Op  Precautions: Fall      ASSESSMENT:  Patient has made significant progress since initial OT evaluation. Pt was independent for bed mobility, supervision for mobility without use of AD. Pt completed standing ADLs at sink with distant supervision, no LOB. Pt is likely close to her ADL baseline and has met all OT goals. Will sign off, PT to follow for stairs and safety during mobility. No OT needs at discharge. Progression toward goals:  [X]            Improving appropriately and progressing toward goals  [ ]            Improving slowly and progressing toward goals  [ ]            Not making progress toward goals and plan of care will be adjusted       PLAN:  Patient will be discharged from occupational therapy at this time.   Rationale for discharge:  [X]   Goals Achieved  [ ]   Aidan Palacio  [ ]   Patient not participating in therapy  [ ]   Other:  Discharge Recommendations:  No OT  Further Equipment Recommendations for Discharge:  none       SUBJECTIVE:   Patient stated I am much better.       OBJECTIVE DATA SUMMARY:   Cognitive/Behavioral Status:                       Functional Mobility and Transfers for ADLs:  Bed Mobility:  Rolling: Independent  Supine to Sit: Independent  Scooting: Independent     Transfers:  Sit to Stand: Supervision  Functional Transfers  Toilet Transfer : Supervision     Balance:  Sitting: Intact  Standing: Impaired; With support  Standing - Static: Good;Constant support  Standing - Dynamic : Fair     ADL Intervention:        Grooming  Brushing Teeth: Supervision/set-up       Patient instructed and indicated understanding the benefits of maintaining activity tolerance, functional mobility, and independence with self care tasks during acute stay to ensure safe return home and to baseline. Encouraged patient to increase frequency and duration OOB, be out of bed for all meals, perform daily ADLs (as approved by RN/MD regarding bathing etc), and performing functional mobility to/from bathroom. Pain:  Pain Scale 1: Numeric (0 - 10)  Pain Intensity 1: 0              Activity Tolerance:   VSS  Please refer to the flowsheet for vital signs taken during this treatment.   After treatment:   [X] Patient left in no apparent distress sitting up in chair  [ ] Patient left in no apparent distress in bed  [X] Call bell left within reach  [X] Nursing notified  [X] Caregiver present  [ ] Bed alarm activated      COMMUNICATION/COLLABORATION:   The patients plan of care was discussed with: Physical Therapist and Registered Nurse     Dianna Post, OT  Time Calculation: 17 mins

## 2017-04-10 NOTE — DISCHARGE SUMMARY
Hospitalist Discharge Summary     Patient ID:  Jayne Appiah  [id number]  70 y.o.  1945    PCP on record: Stephany Barrios MD    Admit date: 4/5/2017  Discharge date and time: 4/10/2017      DISCHARGE DIAGNOSIS:    Acute respiratory failure: POA  Suspect Secondary to Chemo related neurotoxicity (oxaliplatin) vs allergic reaction   Metastatic colon cancer, history of surgeries on chemo  ? Rash on the face     Hypokalemia:       CONSULTATIONS:  IP CONSULT TO OTOLARYNGOLOGY    Excerpted HPI from H&P of Maddy Veloz MD:  Deep Eric is a 70 y. o.female who with history of metastatic colon adenocarcinoma  On chemo per dr. Carlos Jessica  Was in infusion center today, while getting infusion  Pt developed sob and anaphyalxis, with emergent transfer to ER  Then to OR emergent intubation, in the interim got decadron and epi and benadryl     Pt intubated could not provide the history  Spoke with dr. Carlos Jessica and dr. German Spain to get the history     We were asked to admit for work up and evaluation of the above problems. ______________________________________________________________________  DISCHARGE SUMMARY/HOSPITAL COURSE:  for full details see H&P, daily progress notes, labs, consult notes. Acute respiratory failure: POA  Suspect Secondary to Chemo related neurotoxicity (oxaliplatin) vs allergic reaction causing anaphylaxis because pt has stridor on presenetation  PE was not considered as differential   S/p successful extubation 4/8  Tolerating regular po diet. Stable, will discharge home. intensivist/ENT on board, much appreciate  outpt ENT f/u    Metastatic colon cancer, history of surgeries on chemo  Follows with dr. Carlos Jessica  On chemo as outpt  4/7 MRI brain-No mets or acute pathology     ?  Rash on the face vectibix related -- added doxy a day prior to admissin ( was also to cover uti???), now rash almost resolved  Oncology follow up, doxy on hold per intensivist on admission      Hypokalemia:   replaced      Code Status: full  Baseline: independent    ___________________________________________________________________  Patient seen and examined by me on discharge day. Pertinent Findings:  Gen:    Not in distress  Chest: Clear lungs  CVS:   Regular rhythm. No edema  Abd:  Soft, not distended, not tender  Neuro:  Alert, CN 2-12 grossly intact  _______________________________________________________________________  DISCHARGE MEDICATIONS:   Current Discharge Medication List      CONTINUE these medications which have NOT CHANGED    Details   potassium chloride (K-DUR, KLOR-CON) 20 mEq tablet Take 1 Tab by mouth two (2) times a day. Qty: 60 Tab, Refills: 3      fexofenadine-pseudoephedrine (ALLEGRA-D 12 HOUR)  mg Tb12 Take 1 Tab by mouth every twelve (12) hours. oxyCODONE IR (ROXICODONE) 5 mg immediate release tablet Take 1 Tab by mouth every four (4) hours as needed for Pain. Max Daily Amount: 30 mg.  Qty: 60 Tab, Refills: 0    Associated Diagnoses: Metastatic colon cancer in [facility]); Right upper quadrant abdominal pain      acetaminophen (TYLENOL) 500 mg tablet Take  by mouth every six (6) hours as needed for Pain. My Recommended Diet, Activity, Wound Care, and follow-up labs are listed in the patient's Discharge Insturctions which I have personally completed and reviewed.     _______________________________________________________________________  DISPOSITION:    Home with Family: x   Home with HH/PT/OT/RN:    SNF/LTC:    MIKAELA:    OTHER:        Condition at Discharge:  Stable  _______________________________________________________________________  Follow up with:   PCP : West Mckay MD  Follow-up Information     Follow up With Details [address], MD   [address] [place] [address]      Kymberly Waggoner MD   [facility]. [address]   [address] [phone]                Total time in minutes spent coordinating this discharge (includes going over instructions, follow-up, prescriptions, and preparing report for sign off to her PCP) :  35 minutes    Signed:  Radha Varma MD

## 2017-04-10 NOTE — PROGRESS NOTES
Pt. Discharged home today with family. She is to F/U with PCP and Oncology in 3-5 days. No HH or DME needs. Horacio TUCKER,LN,[phone]    Care Management Interventions  PCP Verified by CM: Yes  Mode of Transport at Discharge: Other (see comment) (ishan Drew)  Physical Therapy Consult: Yes  Current Support Network: Other (Lives with her son in a two story home but she is on the first floor. She is active including working and driving. Son says she has done well at home up until this hospitalization. )  Confirm Follow Up Transport: Family  Plan discussed with Pt/Family/Caregiver: Yes (I met with ishan Drew at bedside. )  Home with family assistance.

## 2017-04-10 NOTE — PROGRESS NOTES
Patient discharged home with family at bedside. Port access removed per hospital policy using sodium citrate as opposed to heparin due to allergies. Discharge instructions reviewed and understood.

## 2017-04-10 NOTE — PROGRESS NOTES
Problem: Mobility Impaired (Adult and Pediatric)  Goal: *Acute Goals and Plan of Care (Insert Text)  Physical Therapy Goals  Initiated 4/7/2017 - revised 4/10/17  1. Patient will move from supine to sit and sit to supine , scoot up and down and roll side to side in bed with modified independence within 7 day(s). 2. Patient will transfer from bed to chair and chair to bed with minimal assistance/contact guard assist using the least restrictive device within 7 day(s). 3. Patient will perform sit to stand with minimal assistance/contact guard assist within 7 day(s). Goals added 4/10/17  4. Patient will ambulate with independence for 250 feet with the least restrictive device within 7 day(s). 5. Patient will ascend/descend 5 stairs with bilateral handrail(s) with independence within 7 day(s). .   PHYSICAL THERAPY TREATMENT  Patient: Kennedi Bneitez (16 y.o. female)  Date: 4/10/2017  Diagnosis: Emergent Intabation Needed  Respiratory failure ([address]  Compromise airway <principal problem not specified>  Procedure(s) (LRB):  EXTUBATION AND LARYNGOSOCOPY (N/A) 2 Days Post-Op  Precautions: Fall      ASSESSMENT:  Pt received supine in bed, agreeable to participation with therapy. Pt with significant improvements in functional mobility however mild impairments in activity tolerance/endurance and BLE strength remain. Pt performed all aspects of functional mobility at supervision/stand-by assist this date including sit<>stand transfers and ambulation of 140ft. Pt with mild increase in trunk sway w/ decreased gait speed however no overt LOB or difficulty noted. Pt is likely near her baseline level however would benefit from 1-2 additional skilled therapy sessions to assess safety during stair climbing and ensure return to PLOF. Recommend  PT follow up at discharge.      Progression toward goals:  [X]    Improving appropriately and progressing toward goals  [ ]    Improving slowly and progressing toward goals  [ ]    Not making progress toward goals and plan of care will be adjusted       PLAN:  Patient continues to benefit from skilled intervention to address the above impairments. Continue treatment per established plan of care. Discharge Recommendations:  Home Health  Further Equipment Recommendations for Discharge:  None       SUBJECTIVE:   Patient stated [facility] voice still feels a little off but its MUCH better.       OBJECTIVE DATA SUMMARY:   Critical Behavior:  Neurologic State: Alert  Orientation Level: Oriented X4  Cognition: Appropriate decision making, Appropriate for age attention/concentration, Appropriate safety awareness, Follows commands  Safety/Judgement:  (unable to assess at this time)  Functional Mobility Training:  Bed Mobility:  Rolling: Independent  Supine to Sit: Independent     Scooting: Independent        Transfers:  Sit to Stand: Supervision  Stand to Sit: Supervision        Bed to Chair: Stand-by asssistance                    Balance:  Sitting: Intact  Standing: Impaired; With support  Standing - Static: Good;Constant support  Standing - Dynamic : Fair  Ambulation/Gait Training:  Distance (ft): 140 Feet (ft)  Assistive Device: Gait belt  Ambulation - Level of Assistance: Stand-by asssistance        Gait Abnormalities: Decreased step clearance;Trunk sway increased        Base of Support: Widened     Speed/Hallie: Pace decreased (<100 feet/min)                                     Pain:  Pain Scale 1: Numeric (0 - 10)  Pain Intensity 1: 0              Activity Tolerance:   VSS on RA  Please refer to the flowsheet for vital signs taken during this treatment.   After treatment:   [X]    Patient left in no apparent distress sitting up in chair  [ ]    Patient left in no apparent distress in bed  [X]    Call bell left within reach  [X]    Nursing notified  [X]    Caregiver present  [ ]    Bed alarm activated      COMMUNICATION/COLLABORATION:   The patients plan of care was discussed with: Occupational Therapist and Registered Nurse     Rashi Lara, PT, DPT   Time Calculation: 14 mins

## 2017-04-10 NOTE — PROGRESS NOTES
Oncology Nursing Communication Tool      7:47 PM  4/10/2017     Bedside shift change report given to NEHEMIAH Singleton (incoming nurse) by Ashly Vidal RN (outgoing nurse) on Cm Griffith a 70 y.o. female who was admitted on 4/5/2017  2:26 PM. Report included the following information SBAR, Kardex, Intake/Output, MAR and Recent Results. Significant changes during shift:       Issues for physician to address:             Code Status: Full Code     Infections: No current active infections     Allergies: Beef containing products; Heparin (porcine); Iodinated contrast media - oral and iv dye; Pork/porcine containing products; Augmentin [amoxicillin-pot clavulanate]; Bactrim [sulfamethoprim ds];  Erythromycin; Ciprofloxacin; and Macrobid [nitrofurantoin monohyd/m-cryst]     Current diet: DIET FULL LIQUID       Pain Controlled [x] yes [] no   Bowel Movement [x] yes [] no   Last Bowel Movement (date)    4/9/17            Vital Signs:     Patient Vitals for the past 12 hrs:   Temp Pulse Resp BP SpO2   04/10/17 0606 97.7 °F (36.5 °C) 69 18 105/74 97 %   04/09/17 2111 97.9 °F (36.6 °C) 87 16 104/65 97 %      Intake & Output:       Intake/Output Summary (Last 24 hours) at 04/10/17 0757  Last data filed at 04/10/17 0606   Gross per 24 hour   Intake             1995 ml   Output              425 ml   Net             1570 ml      Laboratory Results:     Recent Results (from the past 12 hour(s))   METABOLIC PANEL, BASIC    Collection Time: 04/10/17  5:54 AM   Result Value Ref Range    Sodium 142 136 - 145 mmol/L    Potassium 3.5 3.5 - 5.1 mmol/L    Chloride 105 97 - 108 mmol/L    CO2 26 21 - 32 mmol/L    Anion gap 11 5 - 15 mmol/L    Glucose 79 65 - 100 mg/dL    BUN 7 6 - 20 MG/DL    Creatinine 0.42 (L) 0.55 - 1.02 MG/DL    BUN/Creatinine ratio 17 12 - 20      GFR est AA >60 >60 ml/min/1.73m2    GFR est non-AA >60 >60 ml/min/1.73m2    Calcium 8.5 8.5 - 10.1 MG/DL   MAGNESIUM    Collection Time: 04/10/17 5:54 AM   Result Value Ref Range    Magnesium 2.0 1.6 - 2.4 mg/dL   PHOSPHORUS    Collection Time: 04/10/17  5:54 AM   Result Value Ref Range    Phosphorus 2.8 2.6 - 4.7 MG/DL   CBC W/O DIFF    Collection Time: 04/10/17  5:54 AM   Result Value Ref Range    WBC 7.4 3.6 - 11.0 K/uL    RBC 4.05 3.80 - 5.20 M/uL    HGB 12.2 11.5 - 16.0 g/dL    HCT 36.9 35.0 - 47.0 %    MCV 91.1 80.0 - 99.0 FL    MCH 30.1 26.0 - 34.0 PG    MCHC 33.1 30.0 - 36.5 g/dL    RDW 16.0 (H) 11.5 - 14.5 %    PLATELET 919 227 - 462 K/uL              Opportunity for questions and clarifications were given to the incoming nurse. Patient's bed is in low position, side rails x2, door open PRN, call bell within reach and patient not in distress.       Osmin Young RN

## 2017-04-10 NOTE — PROGRESS NOTES
Nutrition Assessment:    INTERVENTIONS/RECOMMENDATIONS:   Progress to regular diet when medically feasible  Ensure daily (vanilla)    ASSESSMENT:   Chart reviewed, metastatic colon CA. Pt reports tolerating GI lite diet well. We discussed adding Ensure to diet order to help meet nutrition needs, she agreed. Will continue to follow PO intake and diet advancement. Diet Order: Other (comment) (GI lite)  % Eaten:  No data found. Pertinent Medications: [x] Reviewed []Other:  Pertinent Labs: [x]Reviewed  []Other: (AST and alk phos elevated)   Food Allergies: []None [x]Other:  (beef and pork)   Last BM: 4/9   [x]Active     []Hyperactive  []Hypoactive       [] Absent  BS  Skin:    [x] Intact   [] Incision  [] Breakdown   []Edema   []Other:    Anthropometrics: Height:   Weight: 75 kg (165 lb 5.5 oz)    IBW (%IBW):   ( ) UBW (%UBW): 76.7 kg (169 lb 1.5 oz) (Jan 2017) (92.83 %)    BMI: Body mass index is 27.51 kg/(m^2). This BMI is indicative of:  []Underweight   []Normal   [x]Overweight   [] Obesity   [] Extreme Obesity (BMI>40)  Last Weight Metrics:  Weight Loss Metrics 4/7/2017 4/5/2017 4/5/2017 4/5/2017 4/5/2017 3/22/2017 3/14/2017   Today's Wt 165 lb 5.5 oz - 157 lb 158 lb - 162 lb 8 oz 157 lb 6.5 oz   BMI - 27.51 kg/m2 - 26.13 kg/m2 26.32 kg/m2 27.04 kg/m2 27.02 kg/m2       Estimated Nutrition Needs (Based on): 1650 Kcals/day (MSJ: 1275 x 1.3) , 75 g (1 g/kg) Protein  Carbohydrate: At Least 130 g/day  Fluids: 1650 mL/day    NUTRITION DIAGNOSES:   Problem:  Unintended weight loss      Etiology: related to poor appetite and metastatic colon CA      Signs/Symptoms: as evidenced by 7.2% wt loss over the past 3 months. NUTRITION INTERVENTIONS:  Meals/Snacks: General/healthful diet   Supplements: Commercial supplement              GOAL:   consume >75% of meals and ONS in 2-4 days    NUTRITION MONITORING AND EVALUATION      Food/Nutrient Intake Outcomes:  Total energy intake  Physical Signs/Symptoms Outcomes: Weight/weight change, Electrolyte and renal profile, GI, GI profile, Glucose profile    Previous Goal Met:   [x] Met              [] Progressing Towards Goal              [] Not Progressing Towards Goal   Previous Recommendations:   [x] Implemented          [] Not Implemented          [] Not Applicable    LEARNING NEEDS (Diet, Food/Nutrient-Drug Interaction):    [x] None Identified   [] Identified and Education Provided/Documented   [] Identified and Pt declined/was not appropriate     Cultural, Shinto, OR Ethnic Dietary Needs:    [x] None Identified   [] Identified and Addressed     [x] Interdisciplinary Care Plan Reviewed/Documented    [x] Discharge Planning: General healthy dier   [] Participated in Interdisciplinary Rounds    NUTRITION RISK:    [] High              [x] Moderate           []  Low  []  Minimal/Uncompromised      True SOLIS Dubois  Pager [phone]  Weekend Pager [phone]

## 2017-04-10 NOTE — DISCHARGE INSTRUCTIONS
Patient Discharge Instructions     Pt Name  Ana Bryan   Date of Birth 1945   Age  70 y.o. Medical Record Number  [id number]   PCP Clary Burt MD    Admit date:  4/5/2017 @    [facility]    Room Number  1115/01   Date of Discharge 4/10/2017     Admission Diagnoses:     <principal problem not specified>          Allergies   Allergen Reactions    Beef Containing Products Anaphylaxis    Heparin (Porcine) Hives     Heparin beef starts with hives and can lead to anaphylatic shock    Iodinated Contrast Media - Oral And Iv Dye Rash, Nausea and Vomiting and Other (comments)     Abdominal pain    Pork/Porcine Containing Products Anaphylaxis    Augmentin [Amoxicillin-Pot Clavulanate] Rash     Full body rash, NV    Bactrim [Sulfamethoprim Ds] Hives    Erythromycin Hives and Itching    Ciprofloxacin Unknown (comments)     Splitting headache    Macrobid [Nitrofurantoin Monohyd/M-Cryst] Other (comments)     Headaches, bad dreams        It was our pleasure to have taken care of you during your stay at [facility]   You were admitted to the hospital for  <principal problem not specified>    YOUR OTHER MEDICAL DIAGNOSES INCLUDE:  Present on Admission:   Metastatic colon cancer in female [facility])   History of UTI   Colon cancer ([address]      DIET:  Regular Diet     ACTIVITY: Activity as tolerated      · It is important that you take the medication exactly as they are prescribed. · Keep your medication in the bottles provided by the pharmacist and keep a list of the medication names, dosages, and times to be taken in your wallet. · Do not take other medications without consulting your doctor.        ADDITIONAL INFORMATION: If you experience any of the following symptoms or have any health problem not listed below, then please call your primary care physician or return to the emergency room if you cannot get hold of your doctor: Fever, chills, nausea, vomiting, diarrhea, change in mentation, falling, bleeding, shortness of breath. Follow up   ENT follow up as outpatient    Follow-up Information     Follow up With Details [address], [place]   [address]      Jose Vazquez MD   [facility] [address]   [address] [facility] [address]  [phone]              I understand that if any problems occur once I am discharged, I am supposed to call my Primary care physician for further care or seek help in the Emergency Department at the nearest Healthcare facility. I have had an opportunity to discuss my clinical issues with my doctor and nursing staff. I understand and acknowledge receipt of the above instructions.                                                                                                                                            Physician's or R.N.'s Signature                                                            Date/Time                                                                                                                                              Patient or Representative Signature                                                 Date/Time

## 2017-04-26 NOTE — PROGRESS NOTES
Pt is here for a follow up. Pt reports feeling well, no pain no N/V/D. Pt reports a headache these last few days , but none today.

## 2017-04-26 NOTE — PROGRESS NOTES
Pt arrived to [facility] ambulatory FOLFIRI/panitumumab C1 in no acute distress at 0810.  Assessment unremarkable except pt reports swelling in lower extremities, though better today, and muscle pain in calves bilaterally. R chest port accessed without issue and positive blood return noted.  Labs obtained- CBCap, Chem 8 I-stat, Mag, and Hepatic Function panel. Pt to [place] office for follow-up appointment. Visit Vitals    /74 (BP 1 Location: Left arm, BP Patient Position: Sitting)    Pulse 86    Temp 98 °F (36.7 °C)    Resp 18    Ht 5' 5\" (1.651 m)    Wt 72.6 kg (160 lb)    SpO2 98%    BMI 26.63 kg/m2     Recent Results (from the past 12 hour(s))   CBC WITH 3 PART DIFF    Collection Time: 04/26/17  8:28 AM   Result Value Ref Range    WBC 5.5 3.6 - 11.0 K/uL    RBC 3.94 3.80 - 5.20 M/uL    HGB 12.3 11.5 - 16.0 g/dL    HCT 36.8 35.0 - 47.0 %    MCV 93.4 80.0 - 99.0 FL    MCH 31.2 26.0 - 34.0 PG    MCHC 33.4 30.0 - 36.5 g/dL    RDW 17.1 (H) 11.8 - 15.8 %    PLATELET 945 285 - 408 K/uL    NEUTROPHILS 54 32 - 75 %    MIXED CELLS 13 3.2 - 16.9 %    LYMPHOCYTES 33 12 - 49 %    ABS. NEUTROPHILS 3.0 1.8 - 8.0 K/UL    ABS. MIXED CELLS 0.7 0.2 - 1.2 K/uL    ABS. LYMPHOCYTES 1.8 0.8 - 3.5 K/UL    DF AUTOMATED     MAGNESIUM    Collection Time: 04/26/17  8:28 AM   Result Value Ref Range    Magnesium 1.9 1.6 - 2.4 mg/dL   HEPATIC FUNCTION PANEL    Collection Time: 04/26/17  8:28 AM   Result Value Ref Range    Protein, total 6.9 6.4 - 8.2 g/dL    Albumin 2.9 (L) 3.5 - 5.0 g/dL    Globulin 4.0 2.0 - 4.0 g/dL    A-G Ratio 0.7 (L) 1.1 - 2.2      Bilirubin, total 0.4 0.2 - 1.0 MG/DL    Bilirubin, direct <0.1 0.0 - 0.2 MG/DL    Alk.  phosphatase 172 (H) 45 - 117 U/L    AST (SGOT) 35 15 - 37 U/L    ALT (SGPT) 34 12 - 78 U/L   POC CHEM8    Collection Time: 04/26/17  8:30 AM   Result Value Ref Range    Calcium, ionized (POC) 1.15 1.12 - 1.32 MMOL/L    Sodium (POC) 145 136 - 145 MMOL/L    Potassium (POC) 3.5 3.5 - 5.1 MMOL/L    Chloride (POC) 104 98 - 107 MMOL/L    CO2 (POC) 27 21 - 32 MMOL/L    Anion gap (POC) 18 (H) 5 - 15 mmol/L    Glucose (POC) 112 (H) 65 - 100 MG/DL    BUN (POC) <3 (L) 9 - 20 MG/DL    Creatinine (POC) 0.4 (L) 0.6 - 1.3 MG/DL    GFR-AA (POC) >60 >60 ml/min/1.73m2    GFR, non-AA (POC) >60 >60 ml/min/1.73m2    Hemoglobin (POC) 12.9 11.5 - 16.0 GM/DL    Hematocrit (POC) 38 35.0 - 47.0 %    Comment Notified RN or MD immediately by          The following medications administered:  D5 @ KVO  Aloxi 0.25mg IVP  Decadron 12mg IVP  Atropine 0.4mg IVP  Irinotecan 325mg IV over 90 minutes  Leucovorin 720mg IV over 90 minutes (run concurrently with Irinotecan)  NS @ KVO  Panitumumab 425mg IV over 60 minutes  5FU 720mg IVP  5FU 4,340mg IV over 46 hours via chemo pump    Visit Vitals    /84 (BP 1 Location: Left arm, BP Patient Position: Sitting)    Pulse 77    Temp 98 °F (36.7 °C)    Resp 18    Ht 5' 5\" (1.651 m)    Wt 72.6 kg (160 lb)    SpO2 98%    Breastfeeding No    BMI 26.63 kg/m2       Pt tolerated treatment well.  No adverse reaction noted. Port flushed per policy and 5FU chemo infusing via pump upon discharge.  Pt discharged ambulatory in no acute distress at 1355, accompanied by family member. Next appointment 4/28/17 @ 1500.

## 2017-04-26 NOTE — PROGRESS NOTES
Patient: Kenny Ruffin MRN: [id number]  SSN: [id number]    YOB: 1945  Age: 70 y.o. Sex: female        Diagnosis:     1. Metastatic recurrent colon carcinoma, 3/2016   HOLLIS wild type, Her 2 mutated, MSI stable    2. Local recurrent of colon carcinoma, 01/2016   T4b N1c,Tumor deposits: Present, all 9 LN -ve    3. Colon adenocarcinoma:    T4a N0 M0 (Stage II); all 25 LN clear of disease       Treatment:      1. Systemic chemotherapy (started 4/13/2016)   FOLFIRI, cycle 4 day 1    Irinotecan reduced 25% d/t neutropenia (5/11/2016)   FOLFIRI + Avastin (started 6/8/2016), s/p 5 cycles    Irinotecan reduced 50% d/t neutropenia    5-FU, s/p 2 cycles (stopped 9/7/2016_              FOLFIRI + Avastin - s/p 4 cycles stopped 1/11/2016              FOLFOX + Panitumumab s/p 3 cycles - oxaliplatin d/c'd d/t infusion reaction   FOLFIRI + Panitumumab - Cycle 4 Day 1    2. Laparoscopic assisted resection of abdominal mass with ileocolic and small bowel resection 01/21/2016  3. Adjuvant mFOLFOX    (Between 07/09 - 12/31/2014)  4. Partial colectomy 05/23/2013       Subjective:      Kenny Ruffin is a 79 y.o. female with metastatic colon adenocarcinoma. She was initially diagnosed with high risk Stage II disease and underwent a partial colectomy on 05/23/2013. All 25 LN were uninvolved with disease but the tumor penetrated the serosa into the pericolic fat and there was focal invasion in the appendix as well as the small bowel. Ms. Ghulam Gomez completed 6 months of adjuvant mFOLFOX . She had recurrent disease in the abdomen around the previous site of disease. She underwent laparoscopic resection of the small bowel and colon in Jan 2016. The tumor was found to perforate the small bowel and there was some metastatic deposit. Ms. Ghulam Gomez experienced abdominal pain and subsequent CT showed recurrence of disease in the liver, peritoneum, and ovaries. She resumed chemotherapy with FOLFIRI + Avastin.  Repeat scans showed disease progression and she received FOLFOX + Vectibix. 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 She has since recovered and we will resume therapy today with FOLFIRI + Vectibix. Ms. Jonathon Coley notes some swelling in both ankles but otherwise feels well. Review of Systems:     Constitutional: fatigue  Eyes: negative   Ears, Nose, Mouth, Throat, and Face: negative   Respiratory: negative   Cardiovascular: negative   Gastrointestinal: RUQ pain is better  Integument/Breast: facial rash - improved  Hematologic/Lymphatic: negative   Musculoskeletal:bilateral ankle edema, L>R  Neurological: negative         Past Medical History:   Diagnosis Date    Anemia 5-3-13    Hgb 7.4         received transfuion of two units    Anemia NEC     Arthritis     mild- low back    Cancer (HCC)     colon cancer    GERD (gastroesophageal reflux disease)     at times    Liver disease     spots on liver    Neuropathy     Thyroid disease     nodules     Past Surgical History:   Procedure Laterality Date    ABDOMEN SURGERY PROC UNLISTED  16    lap resection of abdominal mass with ileocolic/small bowel resection.     COLONOSCOPY N/A 2016    COLONOSCOPY performed by Citlali Burns MD at [facility] ENDOSCOPY    HX CERVICAL FUSION      anterior cervical disc and fusion    HX  SECTION      HX GI  13    COLON RESECTION LAPAROSCOPIC RIGHT     HX VASCULAR ACCESS      Portacath      Family History   Problem Relation Age of Onset    Cancer Sister      colon    Colon Cancer Sister     Diabetes Mother     Heart Disease Father     Hypertension Father     Stroke Father     Diabetes Father     Colon Cancer Sister     Cancer Sister      lung     Social History   Substance Use Topics    Smoking status: Former Smoker     Packs/day: 1.00     Years: 20.00     Quit date: 1988    Smokeless tobacco: Never Used Comment: quit 30 yrs ago    Alcohol use 7.0 oz/week     14 Cans of beer per week      Comment: couple beers/day      Prior to Admission medications    Medication Sig Start Date End Date Taking? Authorizing Provider   fexofenadine-pseudoephedrine (ALLEGRA-D 12 HOUR)  mg Tb12 Take 1 Tab by mouth every twelve (12) hours. Yes Historical Provider   oxyCODONE IR (ROXICODONE) 5 mg immediate release tablet Take 1 Tab by mouth every four (4) hours as needed for Pain. Max Daily Amount: 30 mg. 11/23/16  Yes Alpa Masker, NP   potassium chloride (K-DUR, KLOR-CON) 20 mEq tablet Take 1 Tab by mouth two (2) times a day. 4/5/17   Alpa Masker, NP   acetaminophen (TYLENOL) 500 mg tablet Take  by mouth every six (6) hours as needed for Pain. Historical Provider          Allergies   Allergen Reactions    Beef Containing Products Anaphylaxis    Heparin (Porcine) Hives     Heparin beef starts with hives and can lead to anaphylatic shock    Iodinated Contrast Media - Oral And Iv Dye Rash, Nausea and Vomiting and Other (comments)     Abdominal pain    Pork/Porcine Containing Products Anaphylaxis    Augmentin [Amoxicillin-Pot Clavulanate] Rash     Full body rash, NV    Bactrim [Sulfamethoprim Ds] Hives    Erythromycin Hives and Itching    Ciprofloxacin Unknown (comments)     Splitting headache    Macrobid [Nitrofurantoin Monohyd/M-Cryst] Other (comments)     Headaches, bad dreams    Oxaliplatin Other (comments)     Vocal Cord Paralysis            Objective:     Vitals:    04/26/17 0849   BP: 126/82   Pulse: 82   Resp: 18   Temp: 98 °F (36.7 °C)   SpO2: 96%   Weight: 160 lb 1.6 oz (72.6 kg)          Physical Exam:    GENERAL: alert, cooperative   EYE: conjunctivae/corneas clear. LYMPHATIC: Cervical, supraclavicular, and axillary nodes normal.   THROAT & NECK: normal and no erythema or exudates noted. LUNG: clear to auscultation bilaterally   HEART: regular rate and rhythm   ABDOMEN: soft, non-tender.    EXTREMITIES: extremities normal   SKIN: Normal.   NEUROLOGIC: negative         IMAGING:    CT Results (most recent):    Results from Hospital Encounter encounter on 01/23/17   CT ABD PELV W CONT   Narrative INDICATION: Follow-up metastatic colon cancer. COMPARISON: 11/16/2016. TECHNIQUE:  Following the uneventful intravenous administration of 96 cc  Isovue-370, 5 mm axial images were obtained through the chest, abdomen, and  pelvis. Oral contrast was not administered. CT dose reduction was achieved  through use of a standardized protocol tailored for this examination and  automatic exposure control for dose modulation. FINDINGS:    THYROID: No nodule. MEDIASTINUM: No change in subcentimeter pericardiophrenic lymph nodes. Otherwise  unremarkable. KENDALL: No mass or lymphadenopathy. THORACIC AORTA: No dissection or aneurysm. MAIN PULMONARY ARTERY: Normal in caliber. TRACHEA/BRONCHI: Patent. ESOPHAGUS: No wall thickening or dilatation. HEART: Normal in size. PLEURA: No effusion or pneumothorax. LUNGS: No nodule, mass, or airspace disease. LIVER: Multiple mass lesions are redemonstrated. Subjectively there is overall  increase in tumor burden, however the largest lesion at the dome of the liver in  segment 8 has decreased in size from about 3.0 x 3.4 cm to 2.8 x 2.8 cm (series  2, image 47). A more inferior segment 8 lesion has increased in size from 1.9 x  2.5 cm to 2.3 x 2.8 cm (2, 51) and a segment 7 lesion has increased from 1.1 x  1.6 cm to 1.6 x 2.2 cm (2, 51). A caudate lobe lesion has increased from 1.5 x  1.8 cm to 1.8 x 2.3 cm (2, 51). GALLBLADDER: Collapsed and grossly unremarkable. .  SPLEEN: No mass. PANCREAS: No mass or ductal dilatation. ADRENALS: Unremarkable. KIDNEYS: Unremarkable. STOMACH: Small sliding hiatal hernia. Otherwise unremarkable. SMALL BOWEL: No dilatation or wall thickening.   COLON: Status post partial colectomy with right-sided ileocolonic anastomosis  which appears as expected. APPENDIX: Surgically absent. PERITONEUM: Redemonstration of omental caking and intraperitoneal nodularity. Anterior midline peritoneal implant has increased from 1.1 x 1.3 cm to 1.3 x 1.5  cm (2, 96). Nodule in the rectouterine space is increased from approximately 1.2  x 1.6 cm to 1.4 x 1.9 cm (2, 103). Presacral nodules appear grossly stable  measuring 11 x 12 mm and 10 x 12 mm (2, 95 and 96)  RETROPERITONEUM: No lymphadenopathy or aortic aneurysm. REPRODUCTIVE ORGANS: Uterus appears unremarkable. Right ovary appears more  normal measuring 2.4 x 3.7 cm as compared to prior exam measurements of 3.3 x  4.6 cm. Left ovary grossly unremarkable. URINARY BLADDER: No mass or calculus. BONES: Degenerative hip and spine change with no aggressive lesions. ADDITIONAL COMMENTS: Right Port-A-Cath in position with tip of catheter in mid  superior vena cava. Impression IMPRESSION: Overall there is been progression of disease with increase in size  of multiple hepatic metastases and intraperitoneal carcinomatosis with increased  size of peritoneal implants. Previously seen abnormal appearance of the right  ovary is improved and may reflect nonmetastatic process. No new sites of  metastatic disease identified. NOTE: Despite premedication, the patient had an allergic reaction to contrast  administration. I examined the patient in radiology holding with note of diffuse  skin flushing, conjunctiva congestion, and tachycardia with heart rate of 97  bpm. No wheezing or shortness of breath. LABS:  Lab Results   Component Value Date/Time    WBC 5.5 04/26/2017 08:28 AM    Hemoglobin (POC) 12.9 04/26/2017 08:30 AM    HGB 12.3 04/26/2017 08:28 AM    Hematocrit (POC) 38 04/26/2017 08:30 AM    HCT 36.8 04/26/2017 08:28 AM    PLATELET 538 79/83/1079 08:28 AM    MCV 93.4 04/26/2017 08:28 AM         Assessment:      1.  Metastatic recurrent colon carcinoma, 3/2016   HOLLIS wild type, Her 2 mutated, MSI stable    2. Local recurrent of colon carcinoma, 01/2016   T4b N1c,Tumor deposits: Present, all 9 LN -ve    3. Initially diagnosed Colon adenocarcinoma: 05/2013  T4a N0 M0 (Stage II); all 25 LN clear of disease   High risk features - T4a, serosal penetration, involvement of adjacent organs - small bowel and appendix, moderate to poor differentiation     ECOG PS 0  Intent of treatment - palliative    Systemic chemotherapy   FOLFIRI, s/p 3 cycles    Irinotecan reduced 25% d/t neutropenia (5/11/2016)   FOLFIRI + Avastin s/p 8 Cycles     Irinotecan reduced 50% d/t neutropenia (7/6/2016)   5-FU s/p 2 cycles    FOLFIRI + Avastin - s/p 4 Cycles - stopped d/t disease progression    CT Chest, Abd, Pelv (1/23/2017) - disease progression in the liver and intraperitoneal carcinomatosis    Receiving FOLFOX + Panitumumab  S/p 3 Cycles  Oxaliplatin discontinued d/t 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 Blood counts are acceptable. Results reviewed with the patient  Symptom management form reviewed with patient. Will restage disease with MRI of the abdomen      2. Right sided abdominal pain - resolved      3. Facial Rash   From Vectibix  Better    > Using facial lotion      4. Lower extremity edema - L > R    > Doppler bilateral LE      Plan:        > Discontinue Oxaliplatin  > Start chemotherapy with FOLFIRI + Panitumumab  > MRI Abdomen  > Doppler bilateral LE  > Follow-up in 2 weeks        Signed by: Ansley Singleton MD                     April 26, 2017          CC. Ruddy Arroyo MD   CC. Crissy Wong MD   CC.  Leslie Mishra MD

## 2017-04-28 NOTE — PROGRESS NOTES
Pt arrived at [facility] ambulatory and in no distress for Pump Removal.  Assessment unremarkable except patient was fatigued. Per pt she has been hydrating as much as possible as well. Per pt chemo infusion pump stopped at 0915. Visit Vitals    BP 98/62 (BP 1 Location: Left arm, BP Patient Position: At rest)    Pulse 74    Temp 98.4 °F (36.9 °C)    Resp 18    SpO2 97%       Medications received:  NS Flush  Sodium Citrate 4% Flush    1310 Tolerated treatment well, no adverse reaction noted. D/Cd from [facility] ambulatory and in no distress accompanied by self.   Next appt 5/10/17

## 2017-04-28 NOTE — PROGRESS NOTES
Problem: Patient Education: Go to Education Activity  Goal: Patient/Family Education  Outcome: Progressing Towards Goal  Patient very fatigued. Educated pt on getting rest and hydrating as much as possible. Pt voiced understanding of information that was given.

## 2017-05-05 NOTE — PROGRESS NOTES
[facility]  Preliminary Report:  Venous Duplex Leg    Bilateral leg venous duplex was performed. All deep and superficial veins appear compressible with normal Doppler characteristics. Incidental note:  Hypoechoic region not associated with the vascular system, noted in the left popliteal fossa consistent with a baker's cyst or other fluid collection. Final report to follow.

## 2017-05-05 NOTE — PROCEDURES
[facility] [place]  *** FINAL REPORT ***    Name: April Couch  MRN: [id number]    Outpatient  : 17 Sep 1945  HIS Order #: [id number]  [id number] [facility] Visit #: [id number]  Date: 05 May 2017    TYPE OF TEST: Peripheral Venous Testing    REASON FOR TEST  Edema    Right Leg:-  Deep venous thrombosis:           No  Superficial venous thrombosis:    No  Deep venous insufficiency:        No  Superficial venous insufficiency: No    Left Leg:-  Deep venous thrombosis:           No  Superficial venous thrombosis:    No  Deep venous insufficiency:        No  Superficial venous insufficiency: No      INTERPRETATION/FINDINGS  PROCEDURE:  Venous duplex examination using B-mode, color flow and  spectral Doppler of the lower extremity veins. Right leg :  1. Deep vein(s) visualized include the common femoral, proximal  femoral, mid femoral, distal femoral, popliteal(above knee),  popliteal(fossa), popliteal(below knee), posterior tibial and peroneal   veins. 2. No evidence of deep venous thrombosis detected in the veins  visualized. 3. Superficial vein(s) visualized include the great saphenous vein. 4. No evidence of superficial thrombosis detected. Left leg :  1. Deep vein(s) visualized include the common femoral, proximal  femoral, mid femoral, distal femoral, popliteal(above knee),  popliteal(fossa), popliteal(below knee), posterior tibial and peroneal   veins. 2. No evidence of deep venous thrombosis detected in the veins  visualized. 3. Superficial vein(s) visualized include the great saphenous vein. 4. No evidence of superficial thrombosis detected. ADDITIONAL COMMENTS    Incidental finding:  Avascular hypoechoic region noted left popliteal  fossa measuring 2.0 cm x 3.0 cm x 4.1 cm, consistent with a baker's  cyst or other fluid collection. I have personally reviewed the data relevant to the interpretation of  this  study.     TECHNOLOGIST: Veronica Rosas RVT  Signed: 2017 02:00 PM    PHYSICIAN: Marni Chatman MD  Signed: 05/11/2017 08:16 AM

## 2017-05-10 NOTE — PROGRESS NOTES
Pt arrived to [facility] ambulatory in no acute distress at 0810 for C2.  Assessment completed- no complaints voiced. Pt's rash on face is improving. R chest port accessed without issue and positive blood return noted. Labs obtained- CBCap, BMP, Mag.     Notified STEVIE Barraza NP of pt's ANC 1.3 and potassium 3.3. OK to proceed with treatment. Order received to give pt 40 mEq of Potassium PO now. Pt states she has not been taking her oral potassium at home because she had been advised by another MD that it wasn't necessary. Pt instructed to start taking prescribed potassium. Pt verbalized understanding. Visit Vitals    /69 (BP 1 Location: Left arm, BP Patient Position: Sitting)    Pulse 78    Temp 98.1 °F (36.7 °C)    Resp 18    Wt 70.6 kg (155 lb 9.6 oz)    SpO2 97%    BMI 25.89 kg/m2     Recent Results (from the past 12 hour(s))   CBC WITH 3 PART DIFF    Collection Time: 05/10/17  8:29 AM   Result Value Ref Range    WBC 3.1 (L) 3.6 - 11.0 K/uL    RBC 3.98 3.80 - 5.20 M/uL    HGB 12.3 11.5 - 16.0 g/dL    HCT 36.8 35.0 - 47.0 %    MCV 92.5 80.0 - 99.0 FL    MCH 30.9 26.0 - 34.0 PG    MCHC 33.4 30.0 - 36.5 g/dL    RDW 15.9 (H) 11.8 - 15.8 %    PLATELET 599 (L) 751 - 400 K/uL    NEUTROPHILS 43 32 - 75 %    MIXED CELLS 15 3.2 - 16.9 %    LYMPHOCYTES 42 12 - 49 %    ABS. NEUTROPHILS 1.3 (L) 1.8 - 8.0 K/UL    ABS. MIXED CELLS 0.5 0.2 - 1.2 K/uL    ABS.  LYMPHOCYTES 1.3 0.8 - 3.5 K/UL    DF AUTOMATED     METABOLIC PANEL, BASIC    Collection Time: 05/10/17  8:29 AM   Result Value Ref Range    Sodium 144 136 - 145 mmol/L    Potassium 3.3 (L) 3.5 - 5.1 mmol/L    Chloride 108 97 - 108 mmol/L    CO2 30 21 - 32 mmol/L    Anion gap 6 5 - 15 mmol/L    Glucose 106 (H) 65 - 100 mg/dL    BUN 5 (L) 6 - 20 MG/DL    Creatinine 0.43 (L) 0.55 - 1.02 MG/DL    BUN/Creatinine ratio 12 12 - 20      GFR est AA >60 >60 ml/min/1.73m2    GFR est non-AA >60 >60 ml/min/1.73m2    Calcium 8.9 8.5 - 10.1 MG/DL   MAGNESIUM    Collection Time: 05/10/17  8:29 AM   Result Value Ref Range    Magnesium 1.9 1.6 - 2.4 mg/dL       The following medications administered:  D5 KVO  Aloxi 0.25 mg IVP  Decadron 12 mg IVP  Atropine 0.4 mg IVP  Irinotecan 325 mg IV   Leucovorin 720 mg IV (run concurrently with Irinotecan)  Potassium chloride 40 mEq PO  NS KVO  Panitumumab 425 mg IV   5- mg IVP  5-FU 4,340 mg IV via chemo pump set to over 46 hrs    Visit Vitals    /70    Pulse 70    Temp 98.1 °F (36.7 °C)    Resp 18    Ht 5' 5\" (1.651 m)    Wt 70.6 kg (155 lb 9.6 oz)    SpO2 97%    BMI 25.89 kg/m2       Pt tolerated treatment well. Pt discharged ambulatory in no acute distress at 1305, accompanied by family.   Next appointment 5/12 at 3 PM.

## 2017-05-10 NOTE — PROGRESS NOTES
Pao Pacheco is a 70 y.o. female  had concerns including Follow-up and Colon Cancer. HIPAA verified by two patient identifiers. Ms. Chaitanya Fields has a reminder for a \"due or due soon\" health maintenance. I have asked that she contact her primary care provider for follow-up on this health maintenance.

## 2017-05-10 NOTE — PROGRESS NOTES
Patient: Mary Carmen Little MRN: [id number]  SSN: [id number]    YOB: 1945  Age: 70 y.o. Sex: female        Diagnosis:     1. Metastatic recurrent colon carcinoma, 3/2016   HOLLIS wild type, Her 2 mutated, MSI stable    2. Local recurrent of colon carcinoma, 01/2016   T4b N1c,Tumor deposits: Present, all 9 LN -ve    3. Colon adenocarcinoma:    T4a N0 M0 (Stage II); all 25 LN clear of disease       Treatment:      1. Systemic chemotherapy (started 4/13/2016)   FOLFIRI, cycle 4 day 1    Irinotecan reduced 25% d/t neutropenia (5/11/2016)   FOLFIRI + Avastin (started 6/8/2016), s/p 5 cycles    Irinotecan reduced 50% d/t neutropenia    5-FU, s/p 2 cycles (stopped 9/7/2016_              FOLFIRI + Avastin - s/p 4 cycles stopped 1/11/2016              FOLFOX + Panitumumab s/p 3 cycles - oxaliplatin d/c'd d/t infusion reaction   FOLFIRI + Panitumumab - Cycle 2 Day 1    2. Laparoscopic assisted resection of abdominal mass with ileocolic and small bowel resection 01/21/2016  3. Adjuvant mFOLFOX    (Between 07/09 - 12/31/2014)  4. Partial colectomy 05/23/2013       Subjective:      Mary Carmen Little is a 79 y.o. female with metastatic colon adenocarcinoma. She was initially diagnosed with high risk Stage II disease and underwent a partial colectomy on 05/23/2013. All 25 LN were uninvolved with disease but the tumor penetrated the serosa into the pericolic fat and there was focal invasion in the appendix as well as the small bowel. Ms. Clifton Menchaca completed 6 months of adjuvant mFOLFOX . She had recurrent disease in the abdomen around the previous site of disease. She underwent laparoscopic resection of the small bowel and colon in Jan 2016. The tumor was found to perforate the small bowel and there was some metastatic deposit. Ms. Clifton Menchaca experienced abdominal pain and subsequent CT showed recurrence of disease in the liver, peritoneum, and ovaries. She resumed chemotherapy with FOLFIRI + Avastin.  Repeat scans showed disease progression and she received FOLFOX + Vectibix. 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 She is now receiving chemotherapy with FOLFIRI + Vectibix. She feels well and energy has improved. She notes some nasal congestion and dry eyes. Review of Systems:     Constitutional: fatigue  Eyes: negative   Ears, Nose, Mouth, Throat, and Face: nasal congestion, dry eyes  Respiratory: negative   Cardiovascular: negative   Gastrointestinal: RUQ pain is better  Integument/Breast: facial rash - improved  Hematologic/Lymphatic: negative   Musculoskeletal:bilateral ankle edema, L>R  Neurological: negative         Past Medical History:   Diagnosis Date    Anemia 5-3-13    Hgb 7.4         received transfuion of two units    Anemia NEC     Arthritis     mild- low back    Cancer (HCC)     colon cancer    GERD (gastroesophageal reflux disease)     at times    Liver disease     spots on liver    Neuropathy     Thyroid disease     nodules     Past Surgical History:   Procedure Laterality Date    ABDOMEN SURGERY PROC UNLISTED  16    lap resection of abdominal mass with ileocolic/small bowel resection.     COLONOSCOPY N/A 2016    COLONOSCOPY performed by Pk [facility]on [facility]    HX CERVICAL FUSION      anterior cervical disc and fusion    HX  SECTION      HX GI  13    COLON RESECTION LAPAROSCOPIC RIGHT     HX VASCULAR ACCESS      Portacath      Family History   Problem Relation Age of Onset    Cancer Sister      colon    Colon Cancer Sister     Diabetes Mother     Heart Disease Father     Hypertension Father     Stroke Father     Diabetes Father     Colon Cancer Sister     Cancer Sister      lung     Social History   Substance Use Topics    Smoking status: Former Smoker     Packs/day: 1.00     Years: 20.00     Quit date: 1988    Smokeless tobacco: Never Used      Comment: quit 30 yrs ago    Alcohol use 7.0 oz/week     14 Cans of beer per week      Comment: couple beers/day      Prior to Admission medications    Medication Sig Start Date End Date Taking? Authorizing Provider   potassium chloride (K-DUR, KLOR-CON) 20 mEq tablet Take 1 Tab by mouth two (2) times a day. 4/5/17   Sally Magana NP   fexofenadine-pseudoephedrine (ALLEGRA-D 12 HOUR)  mg Tb12 Take 1 Tab by mouth every twelve (12) hours. Historical Provider   oxyCODONE IR (ROXICODONE) 5 mg immediate release tablet Take 1 Tab by mouth every four (4) hours as needed for Pain. Max Daily Amount: 30 mg. 11/23/16   Sally Magana NP   acetaminophen (TYLENOL) 500 mg tablet Take  by mouth every six (6) hours as needed for Pain. Historical Provider          Allergies   Allergen Reactions    Beef Containing Products Anaphylaxis    Heparin (Porcine) Hives     Heparin beef starts with hives and can lead to anaphylatic shock    Iodinated Contrast Media - Oral And Iv Dye Rash, Nausea and Vomiting and Other (comments)     Abdominal pain    Pork/Porcine Containing Products Anaphylaxis    Augmentin [Amoxicillin-Pot Clavulanate] Rash     Full body rash, NV    Bactrim [Sulfamethoprim Ds] Hives    Erythromycin Hives and Itching    Ciprofloxacin Unknown (comments)     Splitting headache    Macrobid [Nitrofurantoin Monohyd/M-Cryst] Other (comments)     Headaches, bad dreams    Oxaliplatin Other (comments)     Vocal Cord Paralysis            Objective:     Vitals:    05/10/17 0839   BP: 114/76   Resp: 18   Temp: 97.9 °F (36.6 °C)   SpO2: 96%   Weight: 155 lb 9.6 oz (70.6 kg)   Height: 5' 5\" (1.651 m)          Physical Exam:    GENERAL: alert, cooperative   EYE: conjunctivae/corneas clear. LYMPHATIC: Cervical, supraclavicular, and axillary nodes normal.   THROAT & NECK: normal and no erythema or exudates noted.    LUNG: clear to auscultation bilaterally   HEART: regular rate and rhythm   ABDOMEN: soft, non-tender. EXTREMITIES: extremities normal   SKIN: Normal.   NEUROLOGIC: negative         IMAGING:    CT Results (most recent):    Results from Hospital Encounter encounter on 01/23/17   CT ABD PELV W CONT   Narrative INDICATION: Follow-up metastatic colon cancer. COMPARISON: 11/16/2016. TECHNIQUE:  Following the uneventful intravenous administration of 96 cc  Isovue-370, 5 mm axial images were obtained through the chest, abdomen, and  pelvis. Oral contrast was not administered. CT dose reduction was achieved  through use of a standardized protocol tailored for this examination and  automatic exposure control for dose modulation. FINDINGS:    THYROID: No nodule. MEDIASTINUM: No change in subcentimeter pericardiophrenic lymph nodes. Otherwise  unremarkable. KENDALL: No mass or lymphadenopathy. THORACIC AORTA: No dissection or aneurysm. MAIN PULMONARY ARTERY: Normal in caliber. TRACHEA/BRONCHI: Patent. ESOPHAGUS: No wall thickening or dilatation. HEART: Normal in size. PLEURA: No effusion or pneumothorax. LUNGS: No nodule, mass, or airspace disease. LIVER: Multiple mass lesions are redemonstrated. Subjectively there is overall  increase in tumor burden, however the largest lesion at the dome of the liver in  segment 8 has decreased in size from about 3.0 x 3.4 cm to 2.8 x 2.8 cm (series  2, image 47). A more inferior segment 8 lesion has increased in size from 1.9 x  2.5 cm to 2.3 x 2.8 cm (2, 51) and a segment 7 lesion has increased from 1.1 x  1.6 cm to 1.6 x 2.2 cm (2, 51). A caudate lobe lesion has increased from 1.5 x  1.8 cm to 1.8 x 2.3 cm (2, 51). GALLBLADDER: Collapsed and grossly unremarkable. .  SPLEEN: No mass. PANCREAS: No mass or ductal dilatation. ADRENALS: Unremarkable. KIDNEYS: Unremarkable. STOMACH: Small sliding hiatal hernia. Otherwise unremarkable. SMALL BOWEL: No dilatation or wall thickening.   COLON: Status post partial colectomy with right-sided ileocolonic anastomosis  which appears as expected. APPENDIX: Surgically absent. PERITONEUM: Redemonstration of omental caking and intraperitoneal nodularity. Anterior midline peritoneal implant has increased from 1.1 x 1.3 cm to 1.3 x 1.5  cm (2, 96). Nodule in the rectouterine space is increased from approximately 1.2  x 1.6 cm to 1.4 x 1.9 cm (2, 103). Presacral nodules appear grossly stable  measuring 11 x 12 mm and 10 x 12 mm (2, 95 and 96)  RETROPERITONEUM: No lymphadenopathy or aortic aneurysm. REPRODUCTIVE ORGANS: Uterus appears unremarkable. Right ovary appears more  normal measuring 2.4 x 3.7 cm as compared to prior exam measurements of 3.3 x  4.6 cm. Left ovary grossly unremarkable. URINARY BLADDER: No mass or calculus. BONES: Degenerative hip and spine change with no aggressive lesions. ADDITIONAL COMMENTS: Right Port-A-Cath in position with tip of catheter in mid  superior vena cava. Impression IMPRESSION: Overall there is been progression of disease with increase in size  of multiple hepatic metastases and intraperitoneal carcinomatosis with increased  size of peritoneal implants. Previously seen abnormal appearance of the right  ovary is improved and may reflect nonmetastatic process. No new sites of  metastatic disease identified. NOTE: Despite premedication, the patient had an allergic reaction to contrast  administration. I examined the patient in radiology holding with note of diffuse  skin flushing, conjunctiva congestion, and tachycardia with heart rate of 97  bpm. No wheezing or shortness of breath. LABS:  Lab Results   Component Value Date/Time    WBC 3.1 05/10/2017 08:29 AM    Hemoglobin (POC) 12.9 04/26/2017 08:30 AM    HGB 12.3 05/10/2017 08:29 AM    Hematocrit (POC) 38 04/26/2017 08:30 AM    HCT 36.8 05/10/2017 08:29 AM    PLATELET 588 64/60/9344 08:29 AM    MCV 92.5 05/10/2017 08:29 AM         Assessment:      1.  Metastatic recurrent colon carcinoma, 3/2016   HOLLIS wild type, Her 2 mutated, MSI stable    2. Local recurrent of colon carcinoma, 01/2016   T4b N1c,Tumor deposits: Present, all 9 LN -ve    3. Initially diagnosed Colon adenocarcinoma: 05/2013  T4a N0 M0 (Stage II); all 25 LN clear of disease   High risk features - T4a, serosal penetration, involvement of adjacent organs - small bowel and appendix, moderate to poor differentiation     ECOG PS 0  Intent of treatment - palliative    Systemic chemotherapy   FOLFIRI, s/p 3 cycles    Irinotecan reduced 25% d/t neutropenia (5/11/2016)   FOLFIRI + Avastin s/p 8 Cycles     Irinotecan reduced 50% d/t neutropenia (7/6/2016)   5-FU s/p 2 cycles    FOLFIRI + Avastin - s/p 4 Cycles - stopped d/t disease progression    CT Chest, Abd, Pelv (1/23/2017) - disease progression in the liver and intraperitoneal carcinomatosis    Receiving FOLFOX + Panitumumab  S/p 3 Cycles  Oxaliplatin discontinued d/t 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 Blood counts are acceptable. Results reviewed with the patient    MRI Abdomen (5/5/2017): stable disease    Symptom management form reviewed with patient. 2. Right sided abdominal pain - resolved      3. Facial Rash   From Vectibix  Better    > Using facial lotion      4. Lower extremity edema - L > R    > Doppler bilateral LE - negative       5. Hypokalemia    > Potassium 40 mEq PO today in [facility]      Plan:        > Continue chemotherapy with FOLFIRI + Panitumumab  > Flonase  > Potassium 40 mEq PO today in [facility]  > Follow-up with ophthalmology  > Follow-up in 2 weeks        Signed by: Valeria Rodriguez MD                     May 10, 2017          CC. Mily Vogel MD   CC. Rosy Lang MD   CC.  David Lucero MD

## 2017-05-12 NOTE — PROGRESS NOTES
[facility]patient [facility]fusion [facility] Progress Note    9241 Pt admit to [facility] for Pump Removal ambulatory in stable condition. Assessment completed. No new concerns voiced. CADD pump completed upon arrival to [facility] today. Pump disconnected from port. Port was then flushed per policy and hardy needle removed. Site covered with 2x2 guaze and band aid. 1345 Pt tolerated treatment well. D/c home ambulatory in no distress. Pt aware of next appointment scheduled for 05/24/17.   Patient Vitals for the past 12 hrs:   Temp Pulse Resp BP   05/12/17 1335 96.9 °F (36.1 °C) 65 18 105/65

## 2017-05-17 NOTE — PROGRESS NOTES
Pt called reporting blisters and painful rash to her face, lips, nose and eyes. Pt reports it is gradually getting worse. Pt given an appointment to be seen today.

## 2017-05-19 NOTE — PROGRESS NOTES
Pt called reporting her rash on her face is about the same, but not worse. She reports she does have an appointment with an eye doctor tomorrow since her eyes are irritated and itchy. Pt told to call back if any new or worse symptoms.

## 2017-05-22 NOTE — PROGRESS NOTES
Pt called stating the rash on her face is worse. She reports it is \"hot\" to the touch, has tried several different lotions/creams including vaseline with no relief. Pt did see an eye doctor this past Saturday and he stated her eyes were fine/not effected. Appointment made for Pt to come in for an appointment tomorrow.

## 2017-05-23 NOTE — PROGRESS NOTES
71 y/o cauc female here f/u appt for met colon ca. We saw her in the office last week for rash on her face which has gotten worse. She saw eye MD this past weekend and was told she has calcium deposits in her eyes and eyebrows, systane drops and \"ocuscrubs\" she has been doing this since Saturday and reports she does have more swelling in the eyes in the morning and gets better throughout the day. Pt's face is really red and \"raw\" appearing, alsmot like a sun burn. Senstive to warm and cold water on the face. She has been using Vaseline on her face since Saturday. Pt is afraid to take the potassium due to previous reaction in the infusion center she is not too sure which medicine caused reaction. Ms. Alanna Winslow has a reminder for a \"due or due soon\" health maintenance. I have asked that she contact her primary care provider for follow-up on this health maintenance.

## 2017-05-23 NOTE — PROGRESS NOTES
Patient: Re Arguello MRN: [id number]  SSN: [id number]    YOB: 1945  Age: 70 y.o. Sex: female        Diagnosis:     1. Metastatic recurrent colon carcinoma, 3/2016   HOLLIS wild type, Her 2 mutated, MSI stable    2. Local recurrent of colon carcinoma, 01/2016   T4b N1c,Tumor deposits: Present, all 9 LN -ve    3. Colon adenocarcinoma:    T4a N0 M0 (Stage II); all 25 LN clear of disease       Treatment:      1. Systemic chemotherapy (started 4/13/2016)   FOLFIRI, cycle 4 day 1    Irinotecan reduced 25% d/t neutropenia (5/11/2016)   FOLFIRI + Avastin (started 6/8/2016), s/p 5 cycles    Irinotecan reduced 50% d/t neutropenia    5-FU, s/p 2 cycles (stopped 9/7/2016_              FOLFIRI + Avastin - s/p 4 cycles stopped 1/11/2016              FOLFOX + Panitumumab s/p 3 cycles - oxaliplatin d/c'd d/t infusion reaction   FOLFIRI + Panitumumab - Cycle 2 Day 1    2. Laparoscopic assisted resection of abdominal mass with ileocolic and small bowel resection 01/21/2016  3. Adjuvant mFOLFOX    (Between 07/09 - 12/31/2014)  4. Partial colectomy 05/23/2013       Subjective:      Re Arguello is a 79 y.o. female with metastatic colon adenocarcinoma. She was initially diagnosed with high risk Stage II disease and underwent a partial colectomy on 05/23/2013. All 25 LN were uninvolved with disease but the tumor penetrated the serosa into the pericolic fat and there was focal invasion in the appendix as well as the small bowel. Ms. Pop Medellin completed 6 months of adjuvant mFOLFOX . She had recurrent disease in the abdomen around the previous site of disease. She underwent laparoscopic resection of the small bowel and colon in Jan 2016. The tumor was found to perforate the small bowel and there was some metastatic deposit. Ms. Pop Medellin experienced abdominal pain and subsequent CT showed recurrence of disease in the liver, peritoneum, and ovaries. She resumed chemotherapy with FOLFIRI + Avastin.  Repeat scans showed disease progression and she received FOLFOX + Vectibix. 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 She is now receiving chemotherapy with FOLFIRI + Vectibix. Ms. Clifton Menchaca returns today to evaluate her facial rash from Vectibix. The rash has gotten worse over the past week and she has been using vaseline. She says it itches and she has been applying cold clothes to sooth the pain. She saw an ophthalmologist for her eyes and he told her to use lid scrubs with baby shampoo. She is reluctant to try an oral antibiotic due to her hx of allergic reactions. Review of Systems:     Constitutional: fatigue  Eyes: negative   Ears, Nose, Mouth, Throat, and Face: nasal congestion, dry eyes  Respiratory: negative   Cardiovascular: negative   Gastrointestinal: RUQ pain is better  Integument/Breast: facial rash - improved  Hematologic/Lymphatic: negative   Musculoskeletal:bilateral ankle edema, L>R  Neurological: negative         Past Medical History:   Diagnosis Date    Anemia 5-3-13    Hgb 7.4         received transfuion of two units    Anemia NEC     Arthritis     mild- low back    Cancer (HCC)     colon cancer    GERD (gastroesophageal reflux disease)     at times    Liver disease     spots on liver    Neuropathy     Thyroid disease     nodules     Past Surgical History:   Procedure Laterality Date    ABDOMEN SURGERY PROC UNLISTED  16    lap resection of abdominal mass with ileocolic/small bowel resection.     COLONOSCOPY N/A 2016    COLONOSCOPY performed [facility] at [facility] ENDOSCOPY    HX CERVICAL FUSION      anterior cervical disc and fusion    HX  SECTION      HX GI  13    COLON RESECTION LAPAROSCOPIC RIGHT     HX VASCULAR ACCESS      Portacath      Family History   Problem Relation Age of Onset   [facility] Cancer Sister      colon    Colon Cancer Sister     Diabetes Mother  Heart Disease Father     Hypertension Father     Stroke Father     Diabetes Father     Colon Cancer Sister     Cancer Sister      lung     Social History   Substance Use Topics    Smoking status: Former Smoker     Packs/day: 1.00     Years: 20.00     Quit date: 6/14/1988    Smokeless tobacco: Never Used      Comment: quit 30 yrs ago    Alcohol use 7.0 oz/week     14 Cans of beer per week      Comment: couple beers/day      Prior to Admission medications    Medication Sig Start Date End Date Taking? Authorizing Provider   acetaminophen (TYLENOL) 500 mg tablet Take  by mouth every six (6) hours as needed for Pain. Yes Historical Provider   fluticasone (FLONASE) 50 mcg/actuation nasal spray 2 Sprays by Both Nostrils route daily. 5/10/17   Radha Raman Rodriguez NP   potassium chloride (K-DUR, KLOR-CON) 20 mEq tablet Take 1 Tab by mouth two (2) times a day. 4/5/17   Ken James NP   fexofenadine-pseudoephedrine (ALLEGRA-D 12 HOUR)  mg Tb12 Take 1 Tab by mouth every twelve (12) hours. Historical Provider   oxyCODONE IR (ROXICODONE) 5 mg immediate release tablet Take 1 Tab by mouth every four (4) hours as needed for Pain.  Max Daily Amount: 30 mg. 11/23/16   Ken James NP          Allergies   Allergen Reactions    Beef Containing Products Anaphylaxis    Heparin (Porcine) Hives     Heparin beef starts with hives and can lead to anaphylatic shock    Iodinated Contrast Media - Oral And Iv Dye Rash, Nausea and Vomiting and Other (comments)     Abdominal pain    Pork/Porcine Containing Products Anaphylaxis    Augmentin [Amoxicillin-Pot Clavulanate] Rash     Full body rash, NV    Bactrim [Sulfamethoprim Ds] Hives    Erythromycin Hives and Itching    Ciprofloxacin Unknown (comments)     Splitting headache    Macrobid [Nitrofurantoin Monohyd/M-Cryst] Other (comments)     Headaches, bad dreams    Oxaliplatin Other (comments)     Vocal Cord Paralysis            Objective:     Vitals:    05/23/17 0920 BP: 103/71   Pulse: 76   Resp: 16   Temp: 98.3 °F (36.8 °C)   TempSrc: Oral   SpO2: 99%   Weight: 149 lb (67.6 kg)   Height: 5' 5\" (1.651 m)          Physical Exam:    GENERAL: alert, cooperative   EYE: conjunctivae/corneas clear. LYMPHATIC: Cervical, supraclavicular, and axillary nodes normal.   THROAT & NECK: normal and no erythema or exudates noted. LUNG: clear to auscultation bilaterally   HEART: regular rate and rhythm   ABDOMEN: soft, non-tender. EXTREMITIES: extremities normal   SKIN: Normal.   NEUROLOGIC: negative         IMAGING:    CT Results (most recent):    Results from Hospital Encounter encounter on 01/23/17   CT ABD PELV W CONT   Narrative INDICATION: Follow-up metastatic colon cancer. COMPARISON: 11/16/2016. TECHNIQUE:  Following the uneventful intravenous administration of 96 cc  Isovue-370, 5 mm axial images were obtained through the chest, abdomen, and  pelvis. Oral contrast was not administered. CT dose reduction was achieved  through use of a standardized protocol tailored for this examination and  automatic exposure control for dose modulation. FINDINGS:    THYROID: No nodule. MEDIASTINUM: No change in subcentimeter pericardiophrenic lymph nodes. Otherwise  unremarkable. KENDALL: No mass or lymphadenopathy. THORACIC AORTA: No dissection or aneurysm. MAIN PULMONARY ARTERY: Normal in caliber. TRACHEA/BRONCHI: Patent. ESOPHAGUS: No wall thickening or dilatation. HEART: Normal in size. PLEURA: No effusion or pneumothorax. LUNGS: No nodule, mass, or airspace disease. LIVER: Multiple mass lesions are redemonstrated. Subjectively there is overall  increase in tumor burden, however the largest lesion at the dome of the liver in  segment 8 has decreased in size from about 3.0 x 3.4 cm to 2.8 x 2.8 cm (series  2, image 47).  A more inferior segment 8 lesion has increased in size from 1.9 x  2.5 cm to 2.3 x 2.8 cm (2, 51) and a segment 7 lesion has increased from 1.1 x  1.6 cm to 1.6 x 2.2 cm (2, 51). A caudate lobe lesion has increased from 1.5 x  1.8 cm to 1.8 x 2.3 cm (2, 51). GALLBLADDER: Collapsed and grossly unremarkable. .  SPLEEN: No mass. PANCREAS: No mass or ductal dilatation. ADRENALS: Unremarkable. KIDNEYS: Unremarkable. STOMACH: Small sliding hiatal hernia. Otherwise unremarkable. SMALL BOWEL: No dilatation or wall thickening. COLON: Status post partial colectomy with right-sided ileocolonic anastomosis  which appears as expected. APPENDIX: Surgically absent. PERITONEUM: Redemonstration of omental caking and intraperitoneal nodularity. Anterior midline peritoneal implant has increased from 1.1 x 1.3 cm to 1.3 x 1.5  cm (2, 96). Nodule in the rectouterine space is increased from approximately 1.2  x 1.6 cm to 1.4 x 1.9 cm (2, 103). Presacral nodules appear grossly stable  measuring 11 x 12 mm and 10 x 12 mm (2, 95 and 96)  RETROPERITONEUM: No lymphadenopathy or aortic aneurysm. REPRODUCTIVE ORGANS: Uterus appears unremarkable. Right ovary appears more  normal measuring 2.4 x 3.7 cm as compared to prior exam measurements of 3.3 x  4.6 cm. Left ovary grossly unremarkable. URINARY BLADDER: No mass or calculus. BONES: Degenerative hip and spine change with no aggressive lesions. ADDITIONAL COMMENTS: Right Port-A-Cath in position with tip of catheter in mid  superior vena cava. Impression IMPRESSION: Overall there is been progression of disease with increase in size  of multiple hepatic metastases and intraperitoneal carcinomatosis with increased  size of peritoneal implants. Previously seen abnormal appearance of the right  ovary is improved and may reflect nonmetastatic process. No new sites of  metastatic disease identified. NOTE: Despite premedication, the patient had an allergic reaction to contrast  administration.  I examined the patient in radiology holding with note of diffuse  skin flushing, conjunctiva congestion, and tachycardia with heart rate of 97  bpm. No wheezing or shortness of breath. LABS:  Lab Results   Component Value Date/Time    WBC 3.1 05/10/2017 08:29 AM    Hemoglobin (POC) 12.9 04/26/2017 08:30 AM    HGB 12.3 05/10/2017 08:29 AM    Hematocrit (POC) 38 04/26/2017 08:30 AM    HCT 36.8 05/10/2017 08:29 AM    PLATELET 525 32/28/9476 08:29 AM    MCV 92.5 05/10/2017 08:29 AM         Assessment:      1. Metastatic recurrent colon carcinoma, 3/2016   HOLLIS wild type, Her 2 mutated, MSI stable    2. Local recurrent of colon carcinoma, 01/2016   T4b N1c,Tumor deposits: Present, all 9 LN -ve    3. Initially diagnosed Colon adenocarcinoma: 05/2013  T4a N0 M0 (Stage II); all 25 LN clear of disease   High risk features - T4a, serosal penetration, involvement of adjacent organs - small bowel and appendix, moderate to poor differentiation     ECOG PS 0  Intent of treatment - palliative    Systemic chemotherapy   FOLFIRI, s/p 3 cycles    Irinotecan reduced 25% d/t neutropenia (5/11/2016)   FOLFIRI + Avastin s/p 8 Cycles     Irinotecan reduced 50% d/t neutropenia (7/6/2016)   5-FU s/p 2 cycles    FOLFIRI + Avastin - s/p 4 Cycles - stopped d/t disease progression    CT Chest, Abd, Pelv (1/23/2017) - disease progression in the liver and intraperitoneal carcinomatosis    Receiving FOLFOX + Panitumumab  S/p 3 Cycles  Oxaliplatin discontinued d/t 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 Blood counts are acceptable. Results reviewed with the patient    MRI Abdomen (5/5/2017): stable disease    Symptom management form reviewed with patient. 2. Right sided abdominal pain - resolved      3.  Facial Rash - getting worse    Grade III - from Vectibix  > Medrol dose pack  Due to medication allergic reactions, she is reluctant to try doxycycline at this time    4. Lower extremity edema - L > R    > Doppler bilateral LE - negative       5. Hypokalemia    > Potassium 40 mEq PO today in [facility]      Plan:        > Medrol dose pack   > Hold treatment tomorrow  > Follow-up in 1 week        Signed by: Angely Caceres MD                     June 30, 2017          CC. Ligia Bland MD   CC. Demond Avila MD   CC.  Kemp Litten, MD

## 2017-05-30 NOTE — PROGRESS NOTES
Genna Devlin is a 70 y.o. female had concerns including Follow-up and Colon Cancer. with mets to liver, rash has increased to both arms chest area and back on neck, there is progression in the liver and intraperitoneal carcinomatosis, increase in size of multiple hepatic metastases, patient using trena butter on face now states feeling a little better today. Ms. Royer Galvan has a reminder for a \"due or due soon\" health maintenance. I have asked that she contact her primary care provider for follow-up on this health maintenance.

## 2017-05-30 NOTE — PROGRESS NOTES
Patient: Zain Huffman MRN: [id number]  SSN: [id number]    YOB: 1945  Age: 70 y.o. Sex: female        Diagnosis:     1. Metastatic recurrent colon carcinoma, 3/2016   HOLLIS wild type, Her 2 mutated, MSI stable    2. Local recurrent of colon carcinoma, 01/2016   T4b N1c,Tumor deposits: Present, all 9 LN -ve    3. Colon adenocarcinoma:    T4a N0 M0 (Stage II); all 25 LN clear of disease       Treatment:      1. Systemic chemotherapy (started 4/13/2016)   FOLFIRI, cycle 4 day 1    Irinotecan reduced 25% d/t neutropenia (5/11/2016)   FOLFIRI + Avastin (started 6/8/2016), s/p 5 cycles    Irinotecan reduced 50% d/t neutropenia    5-FU, s/p 2 cycles (stopped 9/7/2016_              FOLFIRI + Avastin - s/p 4 cycles stopped 1/11/2016              FOLFOX + Panitumumab s/p 3 cycles - oxaliplatin d/c'd d/t infusion reaction   FOLFIRI + Panitumumab - s/p 2 Cycles    2. Laparoscopic assisted resection of abdominal mass with ileocolic and small bowel resection 01/21/2016  3. Adjuvant mFOLFOX    (Between 07/09 - 12/31/2014)  4. Partial colectomy 05/23/2013       Subjective:      Zain Huffman is a 79 y.o. female with metastatic colon adenocarcinoma. She was initially diagnosed with high risk Stage II disease and underwent a partial colectomy on 05/23/2013. All 25 LN were uninvolved with disease but the tumor penetrated the serosa into the pericolic fat and there was focal invasion in the appendix as well as the small bowel. Ms. Hillary Basurto completed 6 months of adjuvant mFOLFOX . She had recurrent disease in the abdomen around the previous site of disease. She underwent laparoscopic resection of the small bowel and colon in Jan 2016. The tumor was found to perforate the small bowel and there was some metastatic deposit. Ms. Hillary Basurto experienced abdominal pain and subsequent CT showed recurrence of disease in the liver, peritoneum, and ovaries. She resumed chemotherapy with FOLFIRI + Avastin.  Repeat scans showed disease progression and she received FOLFOX + Vectibix. 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 She is now receiving chemotherapy with FOLFIRI + Vectibix. Ms. Kavin Rawls returns today to evaluate her facial rash from Vectibix. The rash has now spread to her chest, arms and legs. She has completed her medrol dose pack and said the facial rash improved after 2 days of the steroid, but then she broke out on her chest, arms and legs. She does not want to try Doxycycline due to her severe allergic reactions. Review of Systems:     Constitutional: fatigue  Eyes: negative   Ears, Nose, Mouth, Throat, and Face: nasal congestion, dry eyes  Respiratory: negative   Cardiovascular: negative   Gastrointestinal: RUQ pain is better  Integument/Breast: facial, chest, Arms and leg pruritic  rash  Hematologic/Lymphatic: negative   Musculoskeletal:bilateral ankle edema, L>R  Neurological: negative         Past Medical History:   Diagnosis Date    Anemia 5-3-13    Hgb 7.4         received transfuion of two units    Anemia NEC     Arthritis     mild- low back    Cancer (HCC)     colon cancer    GERD (gastroesophageal reflux disease)     at times    Liver disease     spots on liver    Neuropathy     Thyroid disease     nodules     Past Surgical History:   Procedure Laterality Date    ABDOMEN SURGERY PROC UNLISTED  16    lap resection of abdominal mass with ileocolic/small bowel resection.     COLONOSCOPY N/A 2016    COLONOSCOPY performed by Eloise Landau, MD at [facility] ENDOSCOPY    HX CERVICAL FUSION      anterior cervical disc and fusion    HX  SECTION      HX GI  13    COLON RESECTION LAPAROSCOPIC RIGHT     HX VASCULAR ACCESS      Portacath      Family History   Problem Relation Age of Onset   24 \[facility]\"" Cancer Sister      colon    Colon Cancer Sister     Diabetes Mother     Heart Disease Father  Hypertension Father     Stroke Father     Diabetes Father     Colon Cancer Sister     Cancer Sister      lung     Social History   Substance Use Topics    Smoking status: Former Smoker     Packs/day: 1.00     Years: 20.00     Quit date: 6/14/1988    Smokeless tobacco: Never Used      Comment: quit 30 yrs ago    Alcohol use 7.0 oz/week     14 Cans of beer per week      Comment: couple beers/day      Prior to Admission medications    Medication Sig Start Date End Date Taking? Authorizing Provider   methylPREDNISolone (MEDROL DOSEPACK) 4 mg tablet Take as directed 5/23/17  Yes Aristeo Haley NP   fluticasone (FLONASE) 50 mcg/actuation nasal spray 2 Sprays by Both Nostrils route daily. 5/10/17  Yes Felecia Rodriguez NP   potassium chloride (K-DUR, KLOR-CON) 20 mEq tablet Take 1 Tab by mouth two (2) times a day. 4/5/17  Yes Aristeo Haley NP   fexofenadine-pseudoephedrine (ALLEGRA-D 12 HOUR)  mg Tb12 Take 1 Tab by mouth every twelve (12) hours. Yes Historical Provider   oxyCODONE IR (ROXICODONE) 5 mg immediate release tablet Take 1 Tab by mouth every four (4) hours as needed for Pain. Max Daily Amount: 30 mg. 11/23/16  Yes Aristeo Haley NP   acetaminophen (TYLENOL) 500 mg tablet Take  by mouth every six (6) hours as needed for Pain.    Yes Historical Provider          Allergies   Allergen Reactions    Beef Containing Products Anaphylaxis    Heparin (Porcine) Hives     Heparin beef starts with hives and can lead to anaphylatic shock    Iodinated Contrast Media - Oral And Iv Dye Rash, Nausea and Vomiting and Other (comments)     Abdominal pain    Pork/Porcine Containing Products Anaphylaxis    Augmentin [Amoxicillin-Pot Clavulanate] Rash     Full body rash, NV    Bactrim [Sulfamethoprim Ds] Hives    Erythromycin Hives and Itching    Ciprofloxacin Unknown (comments)     Splitting headache    Macrobid [Nitrofurantoin Monohyd/M-Cryst] Other (comments)     Headaches, bad dreams    Oxaliplatin Other (comments)     Vocal Cord Paralysis            Objective:     Vitals:    05/30/17 0914   BP: 117/76   Pulse: 91   Resp: 18   Temp: 97.6 °F (36.4 °C)   SpO2: 98%   Weight: 148 lb 12.8 oz (67.5 kg)   Height: 5' 5\" (1.651 m)          Physical Exam:    GENERAL: alert, cooperative   EYE: conjunctivae/corneas clear. LYMPHATIC: Cervical, supraclavicular, and axillary nodes normal.   THROAT & NECK: normal and no erythema or exudates noted. LUNG: clear to auscultation bilaterally   HEART: regular rate and rhythm   ABDOMEN: soft, non-tender. EXTREMITIES: extremities normal   SKIN: Normal.   NEUROLOGIC: negative         IMAGING:    CT Results (most recent):    Results from Hospital Encounter encounter on 01/23/17   CT ABD PELV W CONT   Narrative INDICATION: Follow-up metastatic colon cancer. COMPARISON: 11/16/2016. TECHNIQUE:  Following the uneventful intravenous administration of 96 cc  Isovue-370, 5 mm axial images were obtained through the chest, abdomen, and  pelvis. Oral contrast was not administered. CT dose reduction was achieved  through use of a standardized protocol tailored for this examination and  automatic exposure control for dose modulation. FINDINGS:    THYROID: No nodule. MEDIASTINUM: No change in subcentimeter pericardiophrenic lymph nodes. Otherwise  unremarkable. KENDALL: No mass or lymphadenopathy. THORACIC AORTA: No dissection or aneurysm. MAIN PULMONARY ARTERY: Normal in caliber. TRACHEA/BRONCHI: Patent. ESOPHAGUS: No wall thickening or dilatation. HEART: Normal in size. PLEURA: No effusion or pneumothorax. LUNGS: No nodule, mass, or airspace disease. LIVER: Multiple mass lesions are redemonstrated. Subjectively there is overall  increase in tumor burden, however the largest lesion at the dome of the liver in  segment 8 has decreased in size from about 3.0 x 3.4 cm to 2.8 x 2.8 cm (series  2, image 47).  A more inferior segment 8 lesion has increased in size from 1.9 x  2.5 cm to 2.3 x 2.8 cm (2, 51) and a segment 7 lesion has increased from 1.1 x  1.6 cm to 1.6 x 2.2 cm (2, 51). A caudate lobe lesion has increased from 1.5 x  1.8 cm to 1.8 x 2.3 cm (2, 51). GALLBLADDER: Collapsed and grossly unremarkable. .  SPLEEN: No mass. PANCREAS: No mass or ductal dilatation. ADRENALS: Unremarkable. KIDNEYS: Unremarkable. STOMACH: Small sliding hiatal hernia. Otherwise unremarkable. SMALL BOWEL: No dilatation or wall thickening. COLON: Status post partial colectomy with right-sided ileocolonic anastomosis  which appears as expected. APPENDIX: Surgically absent. PERITONEUM: Redemonstration of omental caking and intraperitoneal nodularity. Anterior midline peritoneal implant has increased from 1.1 x 1.3 cm to 1.3 x 1.5  cm (2, 96). Nodule in the rectouterine space is increased from approximately 1.2  x 1.6 cm to 1.4 x 1.9 cm (2, 103). Presacral nodules appear grossly stable  measuring 11 x 12 mm and 10 x 12 mm (2, 95 and 96)  RETROPERITONEUM: No lymphadenopathy or aortic aneurysm. REPRODUCTIVE ORGANS: Uterus appears unremarkable. Right ovary appears more  normal measuring 2.4 x 3.7 cm as compared to prior exam measurements of 3.3 x  4.6 cm. Left ovary grossly unremarkable. URINARY BLADDER: No mass or calculus. BONES: Degenerative hip and spine change with no aggressive lesions. ADDITIONAL COMMENTS: Right Port-A-Cath in position with tip of catheter in mid  superior vena cava. Impression IMPRESSION: Overall there is been progression of disease with increase in size  of multiple hepatic metastases and intraperitoneal carcinomatosis with increased  size of peritoneal implants. Previously seen abnormal appearance of the right  ovary is improved and may reflect nonmetastatic process. No new sites of  metastatic disease identified. NOTE: Despite premedication, the patient had an allergic reaction to contrast  administration.  I examined the patient in radiology holding with note of diffuse  skin flushing, conjunctiva congestion, and tachycardia with heart rate of 97  bpm. No wheezing or shortness of breath. LABS:  Lab Results   Component Value Date/Time    WBC 3.1 05/10/2017 08:29 AM    Hemoglobin (POC) 12.9 04/26/2017 08:30 AM    HGB 12.3 05/10/2017 08:29 AM    Hematocrit (POC) 38 04/26/2017 08:30 AM    HCT 36.8 05/10/2017 08:29 AM    PLATELET 197 94/41/0787 08:29 AM    MCV 92.5 05/10/2017 08:29 AM         Assessment:      1. Metastatic recurrent colon carcinoma, 3/2016   HOLLIS wild type, Her 2 mutated, MSI stable    2. Local recurrent of colon carcinoma, 01/2016   T4b N1c,Tumor deposits: Present, all 9 LN -ve    3. Initially diagnosed Colon adenocarcinoma: 05/2013  T4a N0 M0 (Stage II); all 25 LN clear of disease   High risk features - T4a, serosal penetration, involvement of adjacent organs - small bowel and appendix, moderate to poor differentiation     ECOG PS 0  Intent of treatment - palliative    Systemic chemotherapy   FOLFIRI, s/p 3 cycles    Irinotecan reduced 25% d/t neutropenia (5/11/2016)   FOLFIRI + Avastin s/p 8 Cycles     Irinotecan reduced 50% d/t neutropenia (7/6/2016)   5-FU s/p 2 cycles    FOLFIRI + Avastin - s/p 4 Cycles - stopped d/t disease progression    CT Chest, Abd, Pelv (1/23/2017) - disease progression in the liver and intraperitoneal carcinomatosis    Receiving FOLFOX + Panitumumab  S/p 3 Cycles  Oxaliplatin discontinued d/t 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 Blood counts are acceptable. Results reviewed with the patient    MRI Abdomen (5/5/2017): stable disease    Symptom management form reviewed with patient. 2. Right sided abdominal pain - resolved      3. Facial Rash - getting worse.  Now on arms, chest and legs    Grade III - from Vectibix  > Medrol dose pack - completed  Due to medication allergic reactions, she is reluctant to try doxycycline at this time    4. Lower extremity edema - L > R    > Doppler bilateral LE - negative         Plan:        > Hold systemic treatment  > follow up next Tuesday  > Plan to treat next Wednesday. Will decided at that time whether to hold Vectibix and give Avastin. Signed by: Tamiko Esteban MD                     May 30, 2017          CC. Luanne Valdovinos MD   CC. Gabrielle Andersen MD   CC.  Se Muller MD

## 2017-05-30 NOTE — PROGRESS NOTES
[facility]  Social Work Navigator Encounter     Patient Name:  Jolinda Mohs    Medical History: dx colon cancer w/mets     Advance Directives:    Narrative: SW emailed pt ([email or web address]) [employer] forms that were faxed on 4/26/2017   10:58     Barriers to Care:     Plan:   1. Pt will follow up with company.

## 2017-06-06 NOTE — PROGRESS NOTES
[facility] Note:  Arrived - [phone]     Visit Vitals    /59    Pulse 87    Temp 97.6 °F (36.4 °C)    Resp 18    SpO2 98%       Assessment - completed. Chemo held for one week due to severe Vectibix rash covering entire body. Port accessed & flushed per protocol w/o difficulty. Peters needle removed. 1040 - Tolerated well. Pt denies any acute problems/changes. Discharged from [facility] ambulatory. No distress. Next appt: 6/14/17 @ 1000.

## 2017-06-06 NOTE — PROGRESS NOTES
Jayne Appiah is a 70 y.o. female  had concerns including Follow-up and Colon Cancer. Has multiple allergies, c/o mild fatigue,thinning hair,mild itching,skin rash over 50% of body is dying up all over body    Ms. Alfredo Torres has a reminder for a \"due or due soon\" health maintenance. I have asked that she contact her primary care provider for follow-up on this health maintenance.

## 2017-06-07 NOTE — PROGRESS NOTES
Patient: Ana Bryan MRN: [id number]  SSN: [id number]    YOB: 1945  Age: 70 y.o. Sex: female        Diagnosis:     1. Metastatic recurrent colon carcinoma, 3/2016   HOLLIS wild type, Her 2 mutated, MSI stable    2. Local recurrent of colon carcinoma, 01/2016   T4b N1c,Tumor deposits: Present, all 9 LN -ve    3. Colon adenocarcinoma:    T4a N0 M0 (Stage II); all 25 LN clear of disease       Treatment:      1. Systemic chemotherapy (started 4/13/2016)   FOLFIRI, cycle 4 day 1    Irinotecan reduced 25% d/t neutropenia (5/11/2016)   FOLFIRI + Avastin (started 6/8/2016), s/p 5 cycles    Irinotecan reduced 50% d/t neutropenia    5-FU, s/p 2 cycles (stopped 9/7/2016_              FOLFIRI + Avastin - s/p 4 cycles stopped 1/11/2016              FOLFOX + Panitumumab s/p 3 cycles - oxaliplatin d/c'd d/t infusion reaction   FOLFIRI + Panitumumab - s/p 2 Cycles    2. Laparoscopic assisted resection of abdominal mass with ileocolic and small bowel resection 01/21/2016  3. Adjuvant mFOLFOX    (Between 07/09 - 12/31/2014)  4. Partial colectomy 05/23/2013       Subjective:      Ana Bryan is a 79 y.o. female with metastatic colon adenocarcinoma. She was initially diagnosed with high risk Stage II disease and underwent a partial colectomy on 05/23/2013. All 25 LN were uninvolved with disease but the tumor penetrated the serosa into the pericolic fat and there was focal invasion in the appendix as well as the small bowel. Ms. Chaitanya Fields completed 6 months of adjuvant mFOLFOX . She had recurrent disease in the abdomen around the previous site of disease. She underwent laparoscopic resection of the small bowel and colon in Jan 2016. The tumor was found to perforate the small bowel and there was some metastatic deposit. Ms. Chaitanya Fields experienced abdominal pain and subsequent CT showed recurrence of disease in the liver, peritoneum, and ovaries. She resumed chemotherapy with FOLFIRI + Avastin.  Repeat scans showed disease progression and she received FOLFOX + Vectibix. 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 She is now receiving chemotherapy with FOLFIRI + Vectibix. Ms. Eliezer Gilmore returns today to evaluate her facial rash from Vectibix. The rash has now spread to her chest, arms and legs. She has completed her medrol dose pack and said the facial rash improved after 2 days of the steroid, but then she broke out on her chest, arms and legs. She does not want to try Doxycycline due to her severe allergic reactions. She says she feels the rash is drying up and improving and the redness is subsiding. Review of Systems:     Constitutional: fatigue  Eyes: negative   Ears, Nose, Mouth, Throat, and Face: nasal congestion, dry eyes  Respiratory: negative   Cardiovascular: negative   Gastrointestinal: RUQ pain is better  Integument/Breast: facial, chest, Arms and leg pruritic  rash  Hematologic/Lymphatic: negative   Musculoskeletal:bilateral ankle edema, L>R  Neurological: negative         Past Medical History:   Diagnosis Date    Anemia 5-3-13    Hgb 7.4         received transfuion of two units    Anemia NEC     Arthritis     mild- low back    Cancer (HCC)     colon cancer    GERD (gastroesophageal reflux disease)     at times    Liver disease     spots on liver    Neuropathy     Thyroid disease     nodules     Past Surgical History:   Procedure Laterality Date    ABDOMEN SURGERY PROC UNLISTED  16    lap resection of abdominal mass with ileocolic/small bowel resection.     COLONOSCOPY N/A 2016    COLONOSCOPY performed by [facility] at [facility] ENDOSCOPY    HX CERVICAL FUSION      anterior cervical disc and fusion    HX  SECTION      HX GI  13    COLON RESECTION LAPAROSCOPIC RIGHT     HX VASCULAR ACCESS      Portacath      Family History   Problem Relation Age of Onset    Cancer Sister      colon    Colon Cancer Sister     Diabetes Mother     Heart Disease Father     Hypertension Father     Stroke Father     Diabetes Father     Colon Cancer Sister     Cancer Sister      lung     Social History   Substance Use Topics    Smoking status: Former Smoker     Packs/day: 1.00     Years: 20.00     Quit date: 6/14/1988    Smokeless tobacco: Never Used      Comment: quit 30 yrs ago    Alcohol use 7.0 oz/week     14 Cans of beer per week      Comment: couple beers/day      Prior to Admission medications    Medication Sig Start Date End Date Taking? Authorizing Provider   methylPREDNISolone (MEDROL DOSEPACK) 4 mg tablet Take as directed 5/23/17  Yes Ken James NP   fluticasone (FLONASE) 50 mcg/actuation nasal spray 2 Sprays by Both Nostrils route daily. 5/10/17  Yes Felecia Rodriguez NP   potassium chloride (K-DUR, KLOR-CON) 20 mEq tablet Take 1 Tab by mouth two (2) times a day. 4/5/17  Yes Ken James NP   fexofenadine-pseudoephedrine (ALLEGRA-D 12 HOUR)  mg Tb12 Take 1 Tab by mouth every twelve (12) hours. Yes Historical Provider   oxyCODONE IR (ROXICODONE) 5 mg immediate release tablet Take 1 Tab by mouth every four (4) hours as needed for Pain. Max Daily Amount: 30 mg. 11/23/16  Yes Ken James NP   acetaminophen (TYLENOL) 500 mg tablet Take  by mouth every six (6) hours as needed for Pain.    Yes Historical Provider          Allergies   Allergen Reactions    Beef Containing Products Anaphylaxis    Heparin (Porcine) Hives     Heparin beef starts with hives and can lead to anaphylatic shock    Iodinated Contrast Media - Oral And Iv Dye Rash, Nausea and Vomiting and Other (comments)     Abdominal pain    Pork/Porcine Containing Products Anaphylaxis    Augmentin [Amoxicillin-Pot Clavulanate] Rash     Full body rash, NV    Bactrim [Sulfamethoprim Ds] Hives    Erythromycin Hives and Itching    Ciprofloxacin Unknown (comments)     Splitting headache    Macrobid [Nitrofurantoin Monohyd/M-Cryst] Other (comments)     Headaches, bad dreams    Oxaliplatin Other (comments)     Vocal Cord Paralysis            Objective:     Vitals:    06/06/17 0913   BP: 113/70   Pulse: 89   Resp: 18   Temp: 98 °F (36.7 °C)   SpO2: 94%   Weight: 150 lb (68 kg)   Height: 5' 5\" (1.651 m)          Physical Exam:    GENERAL: alert, cooperative   EYE: conjunctivae/corneas clear. LYMPHATIC: Cervical, supraclavicular, and axillary nodes normal.   THROAT & NECK: normal and no erythema or exudates noted. LUNG: clear to auscultation bilaterally   HEART: regular rate and rhythm   ABDOMEN: soft, non-tender. EXTREMITIES: extremities normal   SKIN: Normal.   NEUROLOGIC: negative         IMAGING:    CT Results (most recent):    Results from Hospital Encounter encounter on 01/23/17   CT ABD PELV W CONT   Narrative INDICATION: Follow-up metastatic colon cancer. COMPARISON: 11/16/2016. TECHNIQUE:  Following the uneventful intravenous administration of 96 cc  Isovue-370, 5 mm axial images were obtained through the chest, abdomen, and  pelvis. Oral contrast was not administered. CT dose reduction was achieved  through use of a standardized protocol tailored for this examination and  automatic exposure control for dose modulation. FINDINGS:    THYROID: No nodule. MEDIASTINUM: No change in subcentimeter pericardiophrenic lymph nodes. Otherwise  unremarkable. KENDALL: No mass or lymphadenopathy. THORACIC AORTA: No dissection or aneurysm. MAIN PULMONARY ARTERY: Normal in caliber. TRACHEA/BRONCHI: Patent. ESOPHAGUS: No wall thickening or dilatation. HEART: Normal in size. PLEURA: No effusion or pneumothorax. LUNGS: No nodule, mass, or airspace disease. LIVER: Multiple mass lesions are redemonstrated.  Subjectively there is overall  increase in tumor burden, however the largest lesion at the dome of the liver in  segment 8 has decreased in size from about 3.0 x 3.4 cm to 2.8 x 2.8 cm (series  2, image 47). A more inferior segment 8 lesion has increased in size from 1.9 x  2.5 cm to 2.3 x 2.8 cm (2, 51) and a segment 7 lesion has increased from 1.1 x  1.6 cm to 1.6 x 2.2 cm (2, 51). A caudate lobe lesion has increased from 1.5 x  1.8 cm to 1.8 x 2.3 cm (2, 51). GALLBLADDER: Collapsed and grossly unremarkable. .  SPLEEN: No mass. PANCREAS: No mass or ductal dilatation. ADRENALS: Unremarkable. KIDNEYS: Unremarkable. STOMACH: Small sliding hiatal hernia. Otherwise unremarkable. SMALL BOWEL: No dilatation or wall thickening. COLON: Status post partial colectomy with right-sided ileocolonic anastomosis  which appears as expected. APPENDIX: Surgically absent. PERITONEUM: Redemonstration of omental caking and intraperitoneal nodularity. Anterior midline peritoneal implant has increased from 1.1 x 1.3 cm to 1.3 x 1.5  cm (2, 96). Nodule in the rectouterine space is increased from approximately 1.2  x 1.6 cm to 1.4 x 1.9 cm (2, 103). Presacral nodules appear grossly stable  measuring 11 x 12 mm and 10 x 12 mm (2, 95 and 96)  RETROPERITONEUM: No lymphadenopathy or aortic aneurysm. REPRODUCTIVE ORGANS: Uterus appears unremarkable. Right ovary appears more  normal measuring 2.4 x 3.7 cm as compared to prior exam measurements of 3.3 x  4.6 cm. Left ovary grossly unremarkable. URINARY BLADDER: No mass or calculus. BONES: Degenerative hip and spine change with no aggressive lesions. ADDITIONAL COMMENTS: Right Port-A-Cath in position with tip of catheter in mid  superior vena cava. Impression IMPRESSION: Overall there is been progression of disease with increase in size  of multiple hepatic metastases and intraperitoneal carcinomatosis with increased  size of peritoneal implants. Previously seen abnormal appearance of the right  ovary is improved and may reflect nonmetastatic process. No new sites of  metastatic disease identified.     NOTE: Despite premedication, the patient had an allergic reaction to contrast  administration. I examined the patient in radiology holding with note of diffuse  skin flushing, conjunctiva congestion, and tachycardia with heart rate of 97  bpm. No wheezing or shortness of breath. LABS:  Lab Results   Component Value Date/Time    WBC 3.1 05/10/2017 08:29 AM    Hemoglobin (POC) 12.9 04/26/2017 08:30 AM    HGB 12.3 05/10/2017 08:29 AM    Hematocrit (POC) 38 04/26/2017 08:30 AM    HCT 36.8 05/10/2017 08:29 AM    PLATELET 524 32/43/3303 08:29 AM    MCV 92.5 05/10/2017 08:29 AM         Assessment:      1. Metastatic recurrent colon carcinoma, 3/2016   HOLLIS wild type, Her 2 mutated, MSI stable    2. Local recurrent of colon carcinoma, 01/2016   T4b N1c,Tumor deposits: Present, all 9 LN -ve    3. Initially diagnosed Colon adenocarcinoma: 05/2013  T4a N0 M0 (Stage II); all 25 LN clear of disease   High risk features - T4a, serosal penetration, involvement of adjacent organs - small bowel and appendix, moderate to poor differentiation     ECOG PS 0  Intent of treatment - palliative    Systemic chemotherapy   FOLFIRI, s/p 3 cycles    Irinotecan reduced 25% d/t neutropenia (5/11/2016)   FOLFIRI + Avastin s/p 8 Cycles     Irinotecan reduced 50% d/t neutropenia (7/6/2016)   5-FU s/p 2 cycles    FOLFIRI + Avastin - s/p 4 Cycles - stopped d/t disease progression    CT Chest, Abd, Pelv (1/23/2017) - disease progression in the liver and intraperitoneal carcinomatosis    Receiving FOLFOX + Panitumumab  S/p 3 Cycles  Oxaliplatin discontinued d/t 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 Blood counts are acceptable. Results reviewed with the patient    MRI Abdomen (5/5/2017): stable disease    Symptom management form reviewed with patient.       2. Right sided abdominal pain - resolved      3. Facial Rash - getting worse. Now on arms, chest and legs    Grade III - from Vectibix  > Medrol dose pack - completed  Due to medication allergic reactions, she is reluctant to try doxycycline at this time    4. Lower extremity edema - L > R    > Doppler bilateral LE - negative       Plan:        > Hold systemic treatment for 1 week  > follow up next Wednesday   > Plan to treat next Wednesday. Signed by: Rohini Hernandez NP                     June 7, 2017          CC. Isadora Butler MD   CC. Mary Cheng MD   CC.  Wily Osorio MD

## 2017-06-16 NOTE — PROGRESS NOTES
Spoke with [address] [facility] and verified script for Buddy Wei state they will be contacting Pt for delivery.

## 2017-06-21 NOTE — PROGRESS NOTES
Stephany Lozano is a 70 y.o. female here today for follow up for   had concerns including Follow-up and Colon Cancer. c/o mild itching, rash has cleared except on arms. mild swelling to feet,mild numbness to fingers due to carpal tunnel,total hair loss. Ms. Sheela Bueno has a reminder for a \"due or due soon\" health maintenance. I have asked that she contact her primary care provider for follow-up on this health maintenance.

## 2017-06-21 NOTE — PATIENT INSTRUCTIONS
We will plan to switch your chemotherapy to an oral medication, Lonsurf. This will be sent to you from a specialty pharmacy. They will contact you directly to arrange for delivery.

## 2017-06-21 NOTE — MR AVS SNAPSHOT
Visit Information Date & Time Provider Department Dept. Phone Encounter #  
 6/21/2017  8:15 AM Rory Paige NP [address] [facility] Oncology at [facility] [phone] [id number] Upcoming Health Maintenance Date Due Hepatitis C Screening 1945 DTaP/Tdap/Td series (1 - Tdap) 9/17/1966 ZOSTER VACCINE AGE 60> 9/17/2005 GLAUCOMA SCREENING Q2Y 9/17/2010 OSTEOPOROSIS SCREENING (DEXA) 9/17/2010 MEDICARE YEARLY EXAM 9/17/2010 Pneumococcal 65+ High/Highest Risk (2 of 2 - PPSV23) 12/27/2016 BREAST CANCER SCRN MAMMOGRAM 3/9/2017 FOBT Q 1 YEAR AGE 50-75 8/21/2017 INFLUENZA AGE 9 TO ADULT 8/1/2017 Allergies as of 6/21/2017  Review Complete On: 6/21/2017 By: Rory Paige NP Severity Noted Reaction Type Reactions Beef Containing Products High 01/21/2016    Anaphylaxis Heparin (Porcine) High 11/11/2016    Hives Heparin beef starts with hives and can lead to anaphylatic shock Iodinated Contrast- Oral And Iv Dye High 11/16/2016    Rash, Nausea and Vomiting, Other (comments) Abdominal pain Pork/porcine Containing Products High 01/21/2016    Anaphylaxis Augmentin [Amoxicillin-pot Clavulanate] Medium 03/14/2017   Topical Rash Full body rash, NV Bactrim [Sulfamethoprim Ds] Medium 05/01/2013   Side Effect Hives Erythromycin Medium 05/01/2013   Side Effect Hives, Itching Ciprofloxacin  05/01/2013   Intolerance Unknown (comments) Splitting headache Macrobid [Nitrofurantoin Monohyd/m-cryst]  05/01/2015    Other (comments) Headaches, bad dreams Oxaliplatin  04/25/2017    Other (comments) Vocal Cord Paralysis Current Immunizations  Reviewed on 6/21/2017 Name Date Pneumococcal Conjugate (PCV-13) 11/1/2016 Reviewed by Gatito Cason LPN on 2/03/5859 at  9:01 AM  
You Were Diagnosed With   
  
 Codes Comments Metastatic colon cancer in female [facility])    -  Primary ICD-10-CM: C78.5 ICD-9-CM: 197.5 Anxiety     ICD-10-CM: F41.9 ICD-9-CM: 300.00 Vitals BP Pulse Temp Resp Height(growth percentile) Weight(growth percentile) 121/79 81 97.9 °F (36.6 °C) 18 5' 5\" (1.651 m) 157 lb (71.2 kg) SpO2 BMI OB Status Smoking Status 98% 26.13 kg/m2 Postmenopausal Former Smoker BMI and BSA Data Body Mass Index Body Surface Area  
 26.13 kg/m 2 1.81 m 2 Preferred Pharmacy Pharmacy Name Phone [facility] [phone] Your Updated Medication List  
  
   
This list is accurate as of: 6/21/17 10:00 AM.  Always use your most recent med list.  
  
  
  
  
 acetaminophen 500 mg tablet Commonly known as:  TYLENOL Take  by mouth every six (6) hours as needed for Pain. ALLEGRA-D 12 HOUR  mg Tb12 Generic drug:  fexofenadine-pseudoephedrine Take 1 Tab by mouth every twelve (12) hours. ALPRAZolam 0.25 mg tablet Commonly known as:  Dewayne Cabrera Take 1 Tab by mouth See Admin Instructions. Take 1 tablet 30 mins prior to exam. You may take another if needed during exam.  
  
 fluticasone 50 mcg/actuation nasal spray Commonly known as:  Mable Jennifer 2 Sprays by Both Nostrils route daily. methylPREDNISolone 4 mg tablet Commonly known as:  Barnard Number Take as directed  
  
 oxyCODONE IR 5 mg immediate release tablet Commonly known as:  Clista Mate Take 1 Tab by mouth every four (4) hours as needed for Pain. Max Daily Amount: 30 mg.  
  
 potassium chloride 20 mEq tablet Commonly known as:  K-DUR, KLOR-CON Take 1 Tab by mouth two (2) times a day. Prescriptions Printed Refills ALPRAZolam (XANAX) 0.25 mg tablet 0 Sig: Take 1 Tab by mouth See Admin Instructions. Take 1 tablet 30 mins prior to exam. You may take another if needed during exam.  
 Class: Print Route: Oral  
  
To-Do List   
 06/28/2017 Imaging:  MRI ABD PELV W WO CONT 06/28/2017 8:00 AM  
  Appointment with [facility] [place] [facility] at [facility] ([phone])  
  
 06/30/2017 3:00 PM  
  Appointment with [facility] at [facility] ([phone]) Referral Information Referral ID Referred By Referred To  
  
 [id number] Rylee Victor Not Available Visits Status Start Date End Date 1 New Request 6/21/17 6/21/18 If your referral has a status of pending review or denied, additional information will be sent to support the outcome of this decision. Patient Instructions We will plan to switch your chemotherapy to an oral medication, Lonsurf. This will be sent to you from a specialty pharmacy. They will contact you directly to arrange for delivery. Introducing [facility]! Dear Vadim Kim: 
Thank you for requesting a [facility] account. Our records indicate that you already have an active [facility] account. You can access your account anytime at [email or web address] Did you know that you can access your hospital and ER discharge instructions at any time in [employer] [facility] [employer]? You can also review all of your test results from your hospital stay or ER visit. Additional Information If you have questions, please visit the Frequently Asked Questions section of the [employer] website at [email or web address] Remember, [employer] [facility] is NOT to be used for urgent needs. For medical emergencies, dial 911. Now available from your iPhone and Android! Please provide this summary of care documentation to your next provider. Your primary care clinician is listed as Bairon Huynh. If you have any questions after today's visit, please call [phone].

## 2017-06-21 NOTE — PROGRESS NOTES
Patient: Jeanne Flores MRN: [id number]  SSN: [id number]    YOB: 1945  Age: 70 y.o. Sex: female        Diagnosis:     1. Metastatic recurrent colon carcinoma, 3/2016   HOLLIS wild type, Her 2 mutated, MSI stable    2. Local recurrent of colon carcinoma, 01/2016   T4b N1c,Tumor deposits: Present, all 9 LN -ve    3. Colon adenocarcinoma:    T4a N0 M0 (Stage II); all 25 LN clear of disease       Treatment:      1. Systemic chemotherapy (started 4/13/2016)   FOLFIRI, cycle 4 day 1    Irinotecan reduced 25% d/t neutropenia (5/11/2016)   FOLFIRI + Avastin (started 6/8/2016), s/p 5 cycles    Irinotecan reduced 50% d/t neutropenia    5-FU, s/p 2 cycles (stopped 9/7/2016_              FOLFIRI + Avastin - s/p 4 cycles stopped 1/11/2016              FOLFOX + Panitumumab s/p 3 cycles - oxaliplatin d/c'd d/t infusion reaction   FOLFIRI + Panitumumab - s/p 2 Cycles    2. Laparoscopic assisted resection of abdominal mass with ileocolic and small bowel resection 01/21/2016  3. Adjuvant mFOLFOX    (Between 07/09 - 12/31/2014)  4. Partial colectomy 05/23/2013       Subjective:      Jeanne Flores is a 79 y.o. female with metastatic colon adenocarcinoma. She was initially diagnosed with high risk Stage II disease and underwent a partial colectomy on 05/23/2013. All 25 LN were uninvolved with disease but the tumor penetrated the serosa into the pericolic fat and there was focal invasion in the appendix as well as the small bowel. Ms. Alanna Winslow completed 6 months of adjuvant mFOLFOX . She had recurrent disease in the abdomen around the previous site of disease. She underwent laparoscopic resection of the small bowel and colon in Jan 2016. The tumor was found to perforate the small bowel and there was some metastatic deposit. Ms. Alanna Winslow experienced abdominal pain and subsequent CT showed recurrence of disease in the liver, peritoneum, and ovaries. She resumed chemotherapy with FOLFIRI + Avastin.  Repeat scans showed disease progression and she received FOLFOX + Vectibix. 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 She received 2 cycles of chemotherapy with FOLFIRI + Vectibix and developed a grade III rash on her face and body from Vectibix. Treatment was held and the rash has since resolved. She is here today to discuss alternate treatment options. She feels well without complaints. Review of Systems:     Constitutional: fatigue  Eyes: negative   Ears, Nose, Mouth, Throat, and Face: negative  Respiratory: negative   Cardiovascular: negative   Gastrointestinal: RUQ pain is better  Integument/Breast: negative  Hematologic/Lymphatic: negative   Musculoskeletal:negative  Neurological: negative         Past Medical History:   Diagnosis Date    Anemia 5-3-13    Hgb 7.4         received transfuion of two units    Anemia NEC     Arthritis     mild- low back    Cancer (HCC)     colon cancer    GERD (gastroesophageal reflux disease)     at times    Liver disease     spots on liver    Neuropathy     Thyroid disease     nodules     Past Surgical History:   Procedure Laterality Date    ABDOMEN SURGERY PROC UNLISTED  16    lap resection of abdominal mass with ileocolic/small bowel resection.     COLONOSCOPY N/A 2016    COLONOSCOPY performed by Willem Soliman [facility]    HX CERVICAL FUSION      anterior cervical disc and fusion    HX  SECTION      HX GI  13    COLON RESECTION LAPAROSCOPIC RIGHT     HX VASCULAR ACCESS      Portacath      Family History   Problem Relation Age of Onset    Cancer Sister      colon    Colon Cancer Sister     Diabetes Mother     Heart Disease Father     Hypertension Father     Stroke Father     Diabetes Father     Colon Cancer Sister     Cancer Sister      lung     Social History   Substance Use Topics    Smoking status: Former Smoker     Packs/day: 1.00     Years: 20.00     Quit date: 6/14/1988    Smokeless tobacco: Never Used      Comment: quit 30 yrs ago    Alcohol use 7.0 oz/week     14 Cans of beer per week      Comment: couple beers/day      Prior to Admission medications    Medication Sig Start Date End Date Taking? Authorizing Provider   methylPREDNISolone (MEDROL DOSEPACK) 4 mg tablet Take as directed 5/23/17  Yes Teofilo Tidwell NP   fluticasone (FLONASE) 50 mcg/actuation nasal spray 2 Sprays by Both Nostrils route daily. 5/10/17  Yes Felecia Rodriguez NP   potassium chloride (K-DUR, KLOR-CON) 20 mEq tablet Take 1 Tab by mouth two (2) times a day. 4/5/17  Yes Teofilo Tidwell NP   fexofenadine-pseudoephedrine (ALLEGRA-D 12 HOUR)  mg Tb12 Take 1 Tab by mouth every twelve (12) hours. Yes Historical Provider   oxyCODONE IR (ROXICODONE) 5 mg immediate release tablet Take 1 Tab by mouth every four (4) hours as needed for Pain. Max Daily Amount: 30 mg. 11/23/16  Yes Teofilo Tidwell NP   acetaminophen (TYLENOL) 500 mg tablet Take  by mouth every six (6) hours as needed for Pain.    Yes Historical Provider          Allergies   Allergen Reactions    Beef Containing Products Anaphylaxis    Heparin (Porcine) Hives     Heparin beef starts with hives and can lead to anaphylatic shock    Iodinated Contrast- Oral And Iv Dye Rash, Nausea and Vomiting and Other (comments)     Abdominal pain    Pork/Porcine Containing Products Anaphylaxis    Augmentin [Amoxicillin-Pot Clavulanate] Rash     Full body rash, NV    Bactrim [Sulfamethoprim Ds] Hives    Erythromycin Hives and Itching    Ciprofloxacin Unknown (comments)     Splitting headache    Macrobid [Nitrofurantoin Monohyd/M-Cryst] Other (comments)     Headaches, bad dreams    Oxaliplatin Other (comments)     Vocal Cord Paralysis            Objective:     Vitals:    06/21/17 0857   BP: 121/79   Pulse: 81   Resp: 18   Temp: 97.9 °F (36.6 °C)   SpO2: 98%   Weight: 157 lb (71.2 kg)   Height: 5' 5\" (1.651 m) Physical Exam:    GENERAL: alert, cooperative   EYE: conjunctivae/corneas clear. LYMPHATIC: Cervical, supraclavicular, and axillary nodes normal.   THROAT & NECK: normal and no erythema or exudates noted. LUNG: clear to auscultation bilaterally   HEART: regular rate and rhythm   ABDOMEN: soft, non-tender. EXTREMITIES: extremities normal   SKIN: Normal.   NEUROLOGIC: negative         IMAGING:    CT Results (most recent):    Results from Hospital Encounter encounter on 01/23/17   CT ABD PELV W CONT   Narrative INDICATION: Follow-up metastatic colon cancer. COMPARISON: 11/16/2016. TECHNIQUE:  Following the uneventful intravenous administration of 96 cc  Isovue-370, 5 mm axial images were obtained through the chest, abdomen, and  pelvis. Oral contrast was not administered. CT dose reduction was achieved  through use of a standardized protocol tailored for this examination and  automatic exposure control for dose modulation. FINDINGS:    THYROID: No nodule. MEDIASTINUM: No change in subcentimeter pericardiophrenic lymph nodes. Otherwise  unremarkable. KENDALL: No mass or lymphadenopathy. THORACIC AORTA: No dissection or aneurysm. MAIN PULMONARY ARTERY: Normal in caliber. TRACHEA/BRONCHI: Patent. ESOPHAGUS: No wall thickening or dilatation. HEART: Normal in size. PLEURA: No effusion or pneumothorax. LUNGS: No nodule, mass, or airspace disease. LIVER: Multiple mass lesions are redemonstrated. Subjectively there is overall  increase in tumor burden, however the largest lesion at the dome of the liver in  segment 8 has decreased in size from about 3.0 x 3.4 cm to 2.8 x 2.8 cm (series  2, image 47). A more inferior segment 8 lesion has increased in size from 1.9 x  2.5 cm to 2.3 x 2.8 cm (2, 51) and a segment 7 lesion has increased from 1.1 x  1.6 cm to 1.6 x 2.2 cm (2, 51). A caudate lobe lesion has increased from 1.5 x  1.8 cm to 1.8 x 2.3 cm (2, 51).   GALLBLADDER: Collapsed and grossly unremarkable. .  SPLEEN: No mass. PANCREAS: No mass or ductal dilatation. ADRENALS: Unremarkable. KIDNEYS: Unremarkable. STOMACH: Small sliding hiatal hernia. Otherwise unremarkable. SMALL BOWEL: No dilatation or wall thickening. COLON: Status post partial colectomy with right-sided ileocolonic anastomosis  which appears as expected. APPENDIX: Surgically absent. PERITONEUM: Redemonstration of omental caking and intraperitoneal nodularity. Anterior midline peritoneal implant has increased from 1.1 x 1.3 cm to 1.3 x 1.5  cm (2, 96). Nodule in the rectouterine space is increased from approximately 1.2  x 1.6 cm to 1.4 x 1.9 cm (2, 103). Presacral nodules appear grossly stable  measuring 11 x 12 mm and 10 x 12 mm (2, 95 and 96)  RETROPERITONEUM: No lymphadenopathy or aortic aneurysm. REPRODUCTIVE ORGANS: Uterus appears unremarkable. Right ovary appears more  normal measuring 2.4 x 3.7 cm as compared to prior exam measurements of 3.3 x  4.6 cm. Left ovary grossly unremarkable. URINARY BLADDER: No mass or calculus. BONES: Degenerative hip and spine change with no aggressive lesions. ADDITIONAL COMMENTS: Right Port-A-Cath in position with tip of catheter in mid  superior vena cava. Impression IMPRESSION: Overall there is been progression of disease with increase in size  of multiple hepatic metastases and intraperitoneal carcinomatosis with increased  size of peritoneal implants. Previously seen abnormal appearance of the right  ovary is improved and may reflect nonmetastatic process. No new sites of  metastatic disease identified. NOTE: Despite premedication, the patient had an allergic reaction to contrast  administration. I examined the patient in radiology holding with note of diffuse  skin flushing, conjunctiva congestion, and tachycardia with heart rate of 97  bpm. No wheezing or shortness of breath.           LABS:  Lab Results   Component Value Date/Time    WBC 3.1 05/10/2017 08:29 AM Hemoglobin (POC) 12.9 04/26/2017 08:30 AM    HGB 12.3 05/10/2017 08:29 AM    Hematocrit (POC) 38 04/26/2017 08:30 AM    HCT 36.8 05/10/2017 08:29 AM    PLATELET 161 84/11/7675 08:29 AM    MCV 92.5 05/10/2017 08:29 AM         Assessment:      1. Metastatic recurrent colon carcinoma, 3/2016   HOLLIS wild type, Her 2 mutated, MSI stable    2. Local recurrent of colon carcinoma, 01/2016   T4b N1c,Tumor deposits: Present, all 9 LN -ve    3. Initially diagnosed Colon adenocarcinoma: 05/2013  T4a N0 M0 (Stage II); all 25 LN clear of disease   High risk features - T4a, serosal penetration, involvement of adjacent organs - small bowel and appendix, moderate to poor differentiation     ECOG PS 0  Intent of treatment - palliative    Systemic chemotherapy   FOLFIRI, s/p 3 cycles    Irinotecan reduced 25% d/t neutropenia (5/11/2016)   FOLFIRI + Avastin s/p 8 Cycles     Irinotecan reduced 50% d/t neutropenia (7/6/2016)   5-FU s/p 2 cycles    FOLFIRI + Avastin - s/p 4 Cycles - stopped d/t disease progression    CT Chest, Abd, Pelv (1/23/2017) - disease progression in the liver and intraperitoneal carcinomatosis    Received FOLFOX + Panitumumab  S/p 3 Cycles  Oxaliplatin discontinued d/t 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 Patient was counseled regarding chemotherapy with Lonsurf. Discussion included side effects, toxicity, benefit and risks of chemotherapy. She understood the expected side effects which may include nausea, diarrhea, low blood counts, among other things . After weighing the benefits and risks, she agreed to proceed with chemotherapy. Symptom management form reviewed with patient. 2. Right sided abdominal pain - resolved      3.  Facial Rash - resolved    Grade III - from Vectibix  > Resolved after treatment held      4. Lower extremity edema - L > R    > Doppler bilateral LE - negative       Plan:        > Plan to start treatment with Lonsurf in the near future   > MRI abdomen w/o contrast  > Follow-up at start of treatment        Signed by: Aletha Medellin MD                     June 21, 2017          CC. Jaylen Rain MD   CC. Vita Malone MD   CC.  Fabian Thompson MD

## 2017-06-30 NOTE — TELEPHONE ENCOUNTER
Spoke to Pt about her receiving Lonsurf from specialty pharmacy. Pt reports she has not heard from the specialty pharm for delivery yet. [facility] called to inquire about this. ( [phone] )  Per the rep there they have been trying to get in touch with the Pt to discuss co pay assistance. Pt's phone number verified with pharmacy, also Pt given pharmacy number for her to call and follow up also. Pt instructed to call the office when the med arrives.

## 2017-07-06 NOTE — PROGRESS NOTES
GERARDO FROM DR Kamila Wu MRI ABD PELV CHEST W WO CONTRAST AS A ONE TIME ORDER.    ERRORS OCCURRED WHILE PT WAS IN THE IMAGING CENTER.

## 2017-07-17 NOTE — TELEPHONE ENCOUNTER
Pt called and instructed to start taking Lonsurf as ordered. Pt will go get labs drawn at [facility] today. Pt will also need labs on day 15, these orders were sent to [facility]. Emelina English, Dr Maggi Rodriges, draw cbc and cmp prior to starting Lonsurf and on day 15 of the cycle.   DX met colon cancer

## 2017-08-01 NOTE — PROGRESS NOTES
Pt arrived to [facility] ambulatory in no acute distress at 1320 for [employer] Assessment unremarkable except headache. R chest port flushed without issue and positive blood return noted. 2x2 and paper tape place after Peters needle removed. Labs obtained, CBC, CMP. Visit Vitals    /72 (BP 1 Location: Left arm, BP Patient Position: Sitting)    Pulse 71    Temp 96.8 °F (36 °C)    Resp 18       See [facility] for lab results. The following medications administered:  NS flush  Sodium citrate flush    Pt tolerated treatment well.  Pt discharged ambulatory in no acute distress at 1330, accompanied by self. No further appointments scheduled.

## 2017-08-02 NOTE — PROGRESS NOTES
Spoke with Pt, she is feeling well, no abdominal pain, or other symptoms besides a headache. Pt feels this might be allergy related and will try Allegra, tylenol is the only thing she has tried, without relief. Pt given an appointment for Aug 21 which will be day one of her next cycle of Lonsurf. Pt instructed to call office if any questions or problems.

## 2017-08-21 NOTE — PROGRESS NOTES
1405 Pt arrived at [facility] ambulatory and in no distress for [facility] and Labs. Assessment completed, no new complaints voiced. Port accessed per protocol. CMP drawn and sent. Port flushed and de-accessed. Visit Vitals    /70    Pulse 85    Temp 98.3 °F (36.8 °C)    Resp 18     Recent Results (from the past 12 hour(s))   METABOLIC PANEL, COMPREHENSIVE    Collection Time: 08/21/17  2:16 PM   Result Value Ref Range    Sodium 137 136 - 145 mmol/L    Potassium 3.8 3.5 - 5.1 mmol/L    Chloride 101 97 - 108 mmol/L    CO2 32 21 - 32 mmol/L    Anion gap 4 (L) 5 - 15 mmol/L    Glucose 124 (H) 65 - 100 mg/dL    BUN 7 6 - 20 MG/DL    Creatinine 0.61 0.55 - 1.02 MG/DL    BUN/Creatinine ratio 11 (L) 12 - 20      GFR est AA >60 >60 ml/min/1.73m2    GFR est non-AA >60 >60 ml/min/1.73m2    Calcium 8.9 8.5 - 10.1 MG/DL    Bilirubin, total 3.9 (H) 0.2 - 1.0 MG/DL    ALT (SGPT) 146 (H) 12 - 78 U/L    AST (SGOT) 118 (H) 15 - 37 U/L    Alk. phosphatase 629 (H) 45 - 117 U/L    Protein, total 7.4 6.4 - 8.2 g/dL    Albumin 3.4 (L) 3.5 - 5.0 g/dL    Globulin 4.0 2.0 - 4.0 g/dL    A-G Ratio 0.9 (L) 1.1 - 2.2       Medications received:  none    1415 Tolerated treatment well, no adverse reaction noted. D/Cd from [facility] ambulatory and in no distress accompanied by self. Next appt 9/18.

## 2017-08-21 NOTE — PROGRESS NOTES
Sheela Gerber is a 70 y.o. female here today for  Increased liver enzymes f/u and metastatic colon cancer  f/u. Patient states she has a constant pain 4/10  to right side area around ribs; pain goes away when lying down.

## 2017-08-21 NOTE — PROGRESS NOTES
Patient: Malika Sorensen MRN: [id number]  SSN: [id number]    YOB: 1945  Age: 70 y.o. Sex: female        Diagnosis:     1. Metastatic recurrent colon carcinoma, 3/2016   HOLLIS wild type, Her 2 mutated, MSI stable    2. Local recurrent of colon carcinoma, 01/2016   T4b N1c,Tumor deposits: Present, all 9 LN -ve    3. Colon adenocarcinoma:    T4a N0 M0 (Stage II); all 25 LN clear of disease       Treatment:      1. Systemic chemotherapy (started 4/13/2016)   FOLFIRI, s/p 4 cycles    Irinotecan reduced 25% d/t neutropenia (5/11/2016)   FOLFIRI + Avastin (started 6/8/2016), s/p 5 cycles    Irinotecan reduced 50% d/t neutropenia    5-FU, s/p 2 cycles (stopped 9/7/2016)              FOLFIRI + Avastin - s/p 4 cycles stopped 1/11/2016              FOLFOX + Panitumumab s/p 3 cycles - oxaliplatin d/c'd d/t infusion reaction   FOLFIRI + Panitumumab - s/p 2 Cycles   Lonsurf, s/p 1 cycle    2. Laparoscopic assisted resection of abdominal mass with ileocolic and small bowel resection 01/21/2016  3. Adjuvant mFOLFOX    (Between 07/09 - 12/31/2014)  4. Partial colectomy 05/23/2013        Subjective:      Malika Sorensen is a 79 y.o. female with metastatic colon adenocarcinoma. She was initially diagnosed with high risk Stage II disease and underwent a partial colectomy on 05/23/2013. All 25 LN were uninvolved with disease but the tumor penetrated the serosa into the pericolic fat and there was focal invasion in the appendix as well as the small bowel. Ms. Octavia Hale completed 6 months of adjuvant mFOLFOX . She had recurrent disease in the abdomen around the previous site of disease. She underwent laparoscopic resection of the small bowel and colon in Jan 2016. The tumor was found to perforate the small bowel and there was some metastatic deposit. Ms. Octavia Hale experienced abdominal pain and subsequent CT showed recurrence of disease in the liver, peritoneum, and ovaries.  She resumed chemotherapy with FOLFIRI + Avastin. Repeat scans showed disease progression and she received FOLFOX + Vectibix. 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 She received 2 cycles of chemotherapy with FOLFIRI + Vectibix and developed a grade III rash on her face and body from Vectibix. Treatment was held and the rash has since resolved. Ms. Guillermo Campbell is currently taking Lonsurf and is scheduled to start cycle 2 today. She is complaining of intermittent RUQ pain. She is only working half days and is going to stop work in the next few weeks. Review of Systems:     Constitutional: fatigue  Eyes: negative   Ears, Nose, Mouth, Throat, and Face: negative  Respiratory: negative   Cardiovascular: negative   Gastrointestinal: RUQ pain  Integument/Breast: negative  Hematologic/Lymphatic: negative   Musculoskeletal:negative  Neurological: negative         Past Medical History:   Diagnosis Date    Anemia 5-3-13    Hgb 7.4         received transfuion of two units    Anemia NEC     Arthritis     mild- low back    Cancer (HCC)     colon cancer    GERD (gastroesophageal reflux disease)     at times    Liver disease     spots on liver    Neuropathy (HCC)     Thyroid disease     nodules     Past Surgical History:   Procedure Laterality Date    ABDOMEN SURGERY PROC UNLISTED  16    lap resection of abdominal mass with ileocolic/small bowel resection.     COLONOSCOPY N/A 2016    COLONOSCOPY performed by Temitope Tom [facility] at [facility] ENDOSCOPY    HX CERVICAL FUSION      anterior cervical disc and fusion    HX  SECTION      HX GI  13    COLON RESECTION LAPAROSCOPIC RIGHT     HX VASCULAR ACCESS      Portacath      Family History   Problem Relation Age of Onset   [facility] Cancer Sister      colon    Colon Cancer Sister     Diabetes Mother     Heart Disease Father     Hypertension Father     Stroke Father     Diabetes Father    [facility] Colon Cancer Sister     Cancer Sister      lung     Social History   Substance Use Topics    Smoking status: Former Smoker     Packs/day: 1.00     Years: 20.00     Quit date: 6/14/1988    Smokeless tobacco: Never Used      Comment: quit 30 yrs ago    Alcohol use 7.0 oz/week     14 Cans of beer per week      Comment: couple beers/day      Prior to Admission medications    Medication Sig Start Date End Date Taking? Authorizing Provider   acetaminophen (TYLENOL) 500 mg tablet Take  by mouth every six (6) hours as needed for Pain. Yes Historical Provider   LONSURF 20-8. 19 mg tab TAKE 3 TABLETS (60 MG) BY MOUTH TWICE DAILY ON DAYS 1-5 AND DAYS 8-12 OF EACH 28 DAY CYCLE. TAKE WITHIN 1 HOUR AFTER MORNING AND EVENING JAYMIE 6/30/17   Historical Provider   ALPRAZolam (XANAX) 0.25 mg tablet Take 1 Tab by mouth See Admin Instructions. Take 1 tablet 30 mins prior to exam. You may take another if needed during exam. 6/21/17   Gilson Espinoza NP   methylPREDNISolone (MEDROL DOSEPACK) 4 mg tablet Take as directed 5/23/17   Joe Bennett, NP   fluticasone (FLONASE) 50 mcg/actuation nasal spray 2 Sprays by Both Nostrils route daily. 5/10/17   Yancy Rodriguez, EVELYN   potassium chloride (K-DUR, KLOR-CON) 20 mEq tablet Take 1 Tab by mouth two (2) times a day. 4/5/17   Joe Bennett, NP   fexofenadine-pseudoephedrine (ALLEGRA-D 12 HOUR)  mg Tb12 Take 1 Tab by mouth every twelve (12) hours. Historical Provider   oxyCODONE IR (ROXICODONE) 5 mg immediate release tablet Take 1 Tab by mouth every four (4) hours as needed for Pain.  Max Daily Amount: 30 mg. 11/23/16   Joe Rater, NP          Allergies   Allergen Reactions    Beef Containing Products Anaphylaxis    Heparin (Porcine) Hives     Heparin beef starts with hives and can lead to anaphylatic shock    Iodinated Contrast- Oral And Iv Dye Rash, Nausea and Vomiting and Other (comments)     Abdominal pain    Pork/Porcine Containing Products Anaphylaxis    Augmentin [Amoxicillin-Pot Clavulanate] Rash     Full body rash, NV    Bactrim [Sulfamethoprim Ds] Hives    Erythromycin Hives and Itching    Ciprofloxacin Unknown (comments)     Splitting headache    Macrobid [Nitrofurantoin Monohyd/M-Cryst] Other (comments)     Headaches, bad dreams    Oxaliplatin Other (comments)     Vocal Cord Paralysis            Objective:     Vitals:    08/21/17 1048   BP: 109/73   Pulse: 78   Resp: 16   Temp: 98.5 °F (36.9 °C)   TempSrc: Oral   SpO2: 96%   Weight: 151 lb 6.4 oz (68.7 kg)   Height: 5' 5\" (1.651 m)          Physical Exam:    GENERAL: alert, cooperative   EYE: conjunctivae/corneas clear. LYMPHATIC: Cervical, supraclavicular, and axillary nodes normal.   THROAT & NECK: normal and no erythema or exudates noted. LUNG: clear to auscultation bilaterally   HEART: regular rate and rhythm   ABDOMEN: soft, non-tender. EXTREMITIES: extremities normal   SKIN: Normal.   NEUROLOGIC: negative         IMAGING:    MRI Results (most recent):    Results from Hospital Encounter encounter on 07/14/17   MRI PELV WO CONT   Narrative MRI OF THE ABDOMEN , PELVIS, AND MRCP WITHOUT CONTRAST. INDICATION: restaging of disease   HISTORY (per electronic medical record): Recurrent, metastatic colon carcinoma. HOLLIS wild-type, HER-2 mutated. Partial colectomy May 2013 with negative lymph  nodes; local recurrence in January 2016. COMPARISON: 5/5/2017, 5/20/2013, CT chest abdomen pelvis 1/23/2017, 1/16/2016. TECHNIQUE: Multisequence and multiplanar MRI was performed of the abdomen and  pelvis without contrast. Heavily T2-weighted thick slab and thin slice images  were obtained in the oblique coronal plane through the biliary tree (MRCP). Evaluation of the solid organs is less sensitive without IV contrast. The  patient refused IV contrast.    Please note: MRI of the abdomen and MRI of the pelvis are separately tailored  evaluations.  These do not substitute for or provide the same staging evaluation  as a CT of the abdomen and pelvis, and a portion of the mid abdomen can be  excluded from the field-of-view between the abdomen MRI and pelvis MRI. FINDINGS:     LIVER: Hepatic metastases have slightly increased. The largest metastasis is a  collision of 3 adjacent metastases in segment 8. It measures 23 x 50 mm on image  14-18 today (versus 18 x 44 mm on image  on 5/5/2017). The centrally  located lesion anterior to the IVC at the junction of segments 1 and 8 (14-30,  14-31) now measures 17 x 24 mm. On 5/5/2017, it measured at least 12 x 6 mm on  , and may represent the conglomerate of 2 adjacent or a single lobulated  metastasis measuring 15 x 6 mm. A subcapsular segment 8 metastasis measuring 12  x 10 mm on images 14-32 and 14-30 was not clearly visible previously. A segment  5-8 junction metastasis in the anterior subcapsular aspect now measures 13 x 18  mm on images 14-39 and 14-40 (9 x 12 mm previously on 10-96). A segment 5  metastasis on image 14-46 now measures 11 x 11 mm (previously 5 x 6 mm on  10-87). A new segment 3 metastasis measures 8 x 9 mm on image 14-46. PERITONEUM: Right upper quadrant omental metastases are grossly stable. The  spatial resolution of MRI is not great enough to distinguish small differences. Trace perihepatic ascites is stable. MRCP: Narrowing/waisting of the common bile duct in the dary hepatis (10-23,  11-3) has increased from 5/5/2017 and is new from 5/20/2013. The increase is  less conspicuous on image 15-9. There is a mildly enlarged lymph node in the  portacaval region (12-20). This is probably slightly bigger than 1/23/2017 and  may be the cause of common duct narrowing (extrinsic compression). No  choledocholithiasis. Looping of the pancreatic duct in the uncinate process, a  normal variant. GALLBLADDER: Decompressed. PANCREAS: Normal.  SPLEEN: Normal.  ADRENALS: Normal.  KIDNEYS: Mild left upper pole cortical scarring.   DISTAL ESOPHAGUS: Normal.  STOMACH AND DUODENUM: Normal.  VISUALIZED SMALL BOWEL AND COLON: Post right hemicolectomy. VISUALIZED LUNG BASES: Questionable pulmonary nodules in the right lower lung  field (12-4). Not clearly reproduced on other sequences. BONES: No suspicious lesions. PELVIS: Peritoneal metastases in the pouch of Shahrzad Peeks are probably stable  (10-26, 11-15; 10-37; 10-24) compared to CT 1/23/2017. Peritoneal metastases  anterosuperior to the bladder seen on 1/23/2017 are not as clearly present on  today's study. A left pelvic sidewall lymph node inferior to the external iliac  vessels appears to have increased and now measures 6 mm in short axis (6 x 11 mm  total dimensions on image 11-12, versus 4 x 7 mm on 2-67 on 1/16/2016). A left  external iliac metastasis is stable, measuring 13 mm in short axis on image 11-5  (versus 13 mm on image 2-61 on 1/16/2016). Sigmoid diverticulosis is mild. A T1 and T2 hyperintense lesion in the right  adnexa is probably the right ovary. It is not significantly changed from  1/16/2016. Incidental note is made of S2 Tarlov cysts. Impression IMPRESSION:   1. Increased number and size of hepatic metastases. 2. Grossly stable omental metastases. 3. Stable or slightly increased narrowing/waisting of the common bile duct. 4. Grossly stable pelvic peritoneal carcinomatosis. LABS:    Lab Results   Component Value Date/Time    Sodium 137 08/21/2017 02:16 PM    Potassium 3.8 08/21/2017 02:16 PM    Chloride 101 08/21/2017 02:16 PM    CO2 32 08/21/2017 02:16 PM    Anion gap 4 08/21/2017 02:16 PM    Glucose 124 08/21/2017 02:16 PM    BUN 7 08/21/2017 02:16 PM    Creatinine 0.61 08/21/2017 02:16 PM    BUN/Creatinine ratio 11 08/21/2017 02:16 PM    GFR est AA >60 08/21/2017 02:16 PM    GFR est non-AA >60 08/21/2017 02:16 PM    Calcium 8.9 08/21/2017 02:16 PM    Bilirubin, total 3.9 08/21/2017 02:16 PM    AST (SGOT) 118 08/21/2017 02:16 PM    Alk.  phosphatase 629 08/21/2017 02:16 PM    Protein, total 7.4 08/21/2017 02:16 PM    Albumin 3.4 08/21/2017 02:16 PM    Globulin 4.0 08/21/2017 02:16 PM    A-G Ratio 0.9 08/21/2017 02:16 PM    ALT (SGPT) 146 08/21/2017 02:16 PM         Assessment:      1. Metastatic recurrent colon carcinoma, 3/2016   HOLLIS wild type, Her 2 mutated, MSI stable    2. Local recurrent of colon carcinoma, 01/2016   T4b N1c,Tumor deposits: Present, all 9 LN -ve    3. Initially diagnosed Colon adenocarcinoma: 05/2013  T4a N0 M0 (Stage II); all 25 LN clear of disease   High risk features - T4a, serosal penetration, involvement of adjacent organs - small bowel and appendix, moderate to poor differentiation     ECOG PS 0  Intent of treatment - palliative    Systemic chemotherapy   FOLFIRI, s/p 3 cycles    Irinotecan reduced 25% d/t neutropenia (5/11/2016)   FOLFIRI + Avastin s/p 8 Cycles     Irinotecan reduced 50% d/t neutropenia (7/6/2016)   5-FU s/p 2 cycles    FOLFIRI + Avastin - s/p 4 Cycles - stopped d/t disease progression    CT Chest, Abd, Pelv (1/23/2017) - disease progression in the liver and intraperitoneal carcinomatosis    Received FOLFOX + Panitumumab  S/p 3 Cycles  Oxaliplatin discontinued d/t acute neuropathy of the recurrent laryngeal nerve resulting in vocal cord paralysis    Received systemic chemotherapy   FOLFIRI + Panitumumab - s/p 2 cycles - discontinued d/t grade III rash     MRI Abdomen (7/2017): disease progression    Lonsurf cycle 2 day 1    D/C due to elevated liver enzymes  Symptom management form reviewed with patient. 2. Right sided abdominal pain     > Rx oxycodone 5-10 mg q 4 hours      3. Facial Rash - resolved    Grade III - from Vectibix  > Resolved      4. Lower extremity edema - L > R    > Doppler bilateral LE - negative       5.  Elevated Hepatic panel    From lonsurf or progressive disease in the liver  D/C Lonsurf  Start 5-FU/Cetuximab      Plan:        > hold Lonsurf   > Start Stivarga or Cetuximab/5-FU  > flush port monthly  > Follow-up one month      I saw the patient in conjunction with NEGRITA Arteaga. Signed by: Anna Marie Tavares MD                     August 21, 2017          CC. Jada Dietz MD   CC. Malika Mancilla MD   CC.  Jai Brannon MD

## 2017-08-29 NOTE — PROGRESS NOTES
[facility]IC short consult note: Add on Appointment  1400 Pt arrived to [facility] ambulatory for lab draw. Pt requesting port to be used. Pharmacy notified for sodium citrate flush. /77 (BP 1 Location: Left arm, BP Patient Position: Sitting)  Pulse 82  Temp 97.9 °F (36.6 °C)  Resp 18  SpO2 96%   Recent Results (from the past 12 hour(s))   CBC WITH AUTOMATED DIFF    Collection Time: 08/29/17  2:27 PM   Result Value Ref Range    WBC 6.5 3.6 - 11.0 K/uL    RBC 4.03 3.80 - 5.20 M/uL    HGB 12.1 11.5 - 16.0 g/dL    HCT 37.0 35.0 - 47.0 %    MCV 91.8 80.0 - 99.0 FL    MCH 30.0 26.0 - 34.0 PG    MCHC 32.7 30.0 - 36.5 g/dL    RDW 15.9 (H) 11.5 - 14.5 %    PLATELET 676 429 - 594 K/uL    NEUTROPHILS 74 32 - 75 %    LYMPHOCYTES 18 12 - 49 %    MONOCYTES 8 5 - 13 %    EOSINOPHILS 0 0 - 7 %    BASOPHILS 0 0 - 1 %    ABS. NEUTROPHILS 4.8 1.8 - 8.0 K/UL    ABS. LYMPHOCYTES 1.2 0.8 - 3.5 K/UL    ABS. MONOCYTES 0.5 0.0 - 1.0 K/UL    ABS. EOSINOPHILS 0.0 0.0 - 0.4 K/UL    ABS. BASOPHILS 0.0 0.0 - 0.1 K/UL   METABOLIC PANEL, COMPREHENSIVE    Collection Time: 08/29/17  2:27 PM   Result Value Ref Range    Sodium 139 136 - 145 mmol/L    Potassium 3.2 (L) 3.5 - 5.1 mmol/L    Chloride 100 97 - 108 mmol/L    CO2 35 (H) 21 - 32 mmol/L    Anion gap 4 (L) 5 - 15 mmol/L    Glucose 118 (H) 65 - 100 mg/dL    BUN 7 6 - 20 MG/DL    Creatinine 0.57 0.55 - 1.02 MG/DL    BUN/Creatinine ratio 12 12 - 20      GFR est AA >60 >60 ml/min/1.73m2    GFR est non-AA >60 >60 ml/min/1.73m2    Calcium 9.1 8.5 - 10.1 MG/DL    Bilirubin, total 8.0 (H) 0.2 - 1.0 MG/DL    ALT (SGPT) 157 (H) 12 - 78 U/L    AST (SGOT) 129 (H) 15 - 37 U/L    Alk. phosphatase 688 (H) 45 - 117 U/L    Protein, total 8.1 6.4 - 8.2 g/dL    Albumin 3.1 (L) 3.5 - 5.0 g/dL    Globulin 5.0 (H) 2.0 - 4.0 g/dL    A-G Ratio 0.6 (L) 1.1 - 2.2       Medication given:  none  1435 Port accessed per protocol. Labs drawn, flushed and de-accessed. Pt requesting results.  Pt informed she has an appointment at 2:30 with MD and they will discuss results at that time.

## 2017-08-30 NOTE — PROGRESS NOTES
David Huynh is a 70 y.o. female here for follow up , patient complains of being yellow , having yellow tinge to skin and eyes, states right flank pain# 4/10. Ms. Enrique Jara has a reminder for a \"due or due soon\" health maintenance. I have asked that she contact her primary care provider for follow-up on this health maintenance.

## 2017-08-30 NOTE — PROGRESS NOTES
[employer]  Social Work Navigator Encounter     Patient Name:  Blanca Loo    Medical History:     Advance Directives:    Narrative: SW made referral to pt for hospice, placed call, Dony Le has not been signed. Barriers to Care:     Plan:   1.  SW will continue to provide psychosocial support. Pt can't believe - states no body believes I am sick.

## 2017-08-30 NOTE — PROGRESS NOTES
Patient: Kimberly Redman MRN: [id number]  SSN: [id number]    YOB: 1945  Age: 70 y.o. Sex: female        Diagnosis:     1. Metastatic recurrent colon carcinoma, 3/2016   HOLLIS wild type, Her 2 mutated, MSI stable    2. Local recurrent of colon carcinoma, 01/2016   T4b N1c,Tumor deposits: Present, all 9 LN -ve    3. Colon adenocarcinoma:    T4a N0 M0 (Stage II); all 25 LN clear of disease       Treatment:      1. Systemic chemotherapy (started 4/13/2016)   FOLFIRI, s/p 4 cycles    Irinotecan reduced 25% d/t neutropenia (5/11/2016)   FOLFIRI + Avastin (started 6/8/2016), s/p 5 cycles    Irinotecan reduced 50% d/t neutropenia    5-FU, s/p 2 cycles (stopped 9/7/2016)              FOLFIRI + Avastin - s/p 4 cycles stopped 1/11/2016              FOLFOX + Panitumumab s/p 3 cycles - oxaliplatin d/c'd d/t infusion reaction   FOLFIRI + Panitumumab - s/p 2 Cycles   Lonsurf, s/p 1 cycle    2. Laparoscopic assisted resection of abdominal mass with ileocolic and small bowel resection 01/21/2016  3. Adjuvant mFOLFOX    (Between 07/09 - 12/31/2014)  4. Partial colectomy 05/23/2013        Subjective:      Kimberly Redman is a 79 y.o. female with metastatic colon adenocarcinoma. She was initially diagnosed with high risk Stage II disease and underwent a partial colectomy on 05/23/2013. All 25 LN were uninvolved with disease but the tumor penetrated the serosa into the pericolic fat and there was focal invasion in the appendix as well as the small bowel. Ms. Jeannette Crain completed 6 months of adjuvant mFOLFOX . She had recurrent disease in the abdomen around the previous site of disease. She underwent laparoscopic resection of the small bowel and colon in Jan 2016. The tumor was found to perforate the small bowel and there was some metastatic deposit. Ms. Jeannette Crain experienced abdominal pain and subsequent CT showed recurrence of disease in the liver, peritoneum, and ovaries.  She resumed chemotherapy with FOLFIRI + Avastin. Repeat scans showed disease progression and she received FOLFOX + Vectibix. 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 She received 2 cycles of chemotherapy with FOLFIRI + Vectibix and developed a grade III rash on her face and body from Vectibix. Treatment was held and the rash has since resolved. Ms. Jeannette Crain returns today to discuss her ongoing symptoms of fatigue and decreased appetite as well as her elevated hepatic function. She has not been taking Lonsurf since last week and her liver enzymes are rising. Review of Systems:     Constitutional: fatigue  Eyes: negative   Ears, Nose, Mouth, Throat, and Face: negative  Respiratory: negative   Cardiovascular: negative   Gastrointestinal: RUQ pain, decrease appetite  Integument/Breast: negative  Hematologic/Lymphatic: negative   Musculoskeletal:negative  Neurological: negative         Past Medical History:   Diagnosis Date    Anemia 5-3-13    Hgb 7.4         received transfuion of two units    Anemia NEC     Arthritis     mild- low back    Cancer (HCC)     colon cancer    GERD (gastroesophageal reflux disease)     at times    Liver disease     spots on liver    Neuropathy (HCC)     Thyroid disease     nodules     Past Surgical History:   Procedure Laterality Date    ABDOMEN SURGERY PROC UNLISTED  16    lap resection of abdominal mass with ileocolic/small bowel resection.     COLONOSCOPY N/A 2016    COLONOS[facility] performed [facility] ENDOSCOPY    HX CERVICAL FUSION      anterior cervical disc and fusion    HX  SECTION      HX GI  13    COLON RESECTION LAPAROSCOPIC RIGHT     HX VASCULAR ACCESS      Portacath      Family History   Problem Relation Age of Onset    Cancer Sister      colon    Colon Cancer Sister     Diabetes Mother     Heart Disease Father     Hypertension Father     Stroke Father     Diabetes Father     Colon Cancer Sister     Cancer Sister      lung     Social History   Substance Use Topics    Smoking status: Former Smoker     Packs/day: 1.00     Years: 20.00     Quit date: 6/14/1988    Smokeless tobacco: Never Used      Comment: quit 30 yrs ago    Alcohol use 7.0 oz/week     14 Cans of beer per week      Comment: couple beers/day      Prior to Admission medications    Medication Sig Start Date End Date Taking? Authorizing Provider   JOHNSONSURF 20-8. 19 mg tab TAKE 3 TABLETS (60 MG) BY MOUTH TWICE DAILY ON DAYS 1-5 AND DAYS 8-12 OF EACH 28 DAY CYCLE. TAKE WITHIN 1 HOUR AFTER MORNING AND EVENING JAYMIE 6/30/17  Yes Historical Provider   oxyCODONE IR (ROXICODONE) 5 mg immediate release tablet Take 1-2 Tabs by mouth every four (4) hours as needed for Pain. Max Daily Amount: 60 mg. 8/21/17  Yes Bimal Harding NP   ALPRAZolam (XANAX) 0.25 mg tablet Take 1 Tab by mouth See Admin Instructions. Take 1 tablet 30 mins prior to exam. You may take another if needed during exam. 6/21/17  Yes Nolan Hoffman NP   methylPREDNISolone (MEDROL DOSEPACK) 4 mg tablet Take as directed 5/23/17  Yes Bimal Harding NP   fluticasone (FLONASE) 50 mcg/actuation nasal spray 2 Sprays by Both Nostrils route daily. 5/10/17  Yes Felecia Rodriguez NP   potassium chloride (K-DUR, KLOR-CON) 20 mEq tablet Take 1 Tab by mouth two (2) times a day. 4/5/17  Yes Bimal Harding NP   fexofenadine-pseudoephedrine (ALLEGRA-D 12 HOUR)  mg Tb12 Take 1 Tab by mouth every twelve (12) hours. Yes Historical Provider   acetaminophen (TYLENOL) 500 mg tablet Take  by mouth every six (6) hours as needed for Pain.    Yes Historical Provider          Allergies   Allergen Reactions    Beef Containing Products Anaphylaxis    Heparin (Porcine) Hives     Heparin beef starts with hives and can lead to anaphylatic shock    Iodinated Contrast- Oral And Iv Dye Rash, Nausea and Vomiting and Other (comments)     Abdominal pain    Pork/Porcine Containing Products Anaphylaxis    Augmentin [Amoxicillin-Pot Clavulanate] Rash     Full body rash, NV    Bactrim [Sulfamethoprim Ds] Hives    Erythromycin Hives and Itching    Ciprofloxacin Unknown (comments)     Splitting headache    Macrobid [Nitrofurantoin Monohyd/M-Cryst] Other (comments)     Headaches, bad dreams    Oxaliplatin Other (comments)     Vocal Cord Paralysis            Objective:     Vitals:    08/30/17 1431   BP: 113/76   Pulse: 88   Resp: 18   Temp: 98.7 °F (37.1 °C)   TempSrc: Oral   SpO2: 97%   Weight: 147 lb 6.4 oz (66.9 kg)   Height: 5' 5\" (1.651 m)          Physical Exam:    GENERAL: alert, cooperative   EYE: conjunctivae/corneas clear. LYMPHATIC: Cervical, supraclavicular, and axillary nodes normal.   THROAT & NECK: normal and no erythema or exudates noted. LUNG: clear to auscultation bilaterally   HEART: regular rate and rhythm   ABDOMEN: soft, non-tender. EXTREMITIES: extremities normal   SKIN: Normal.   NEUROLOGIC: negative         IMAGING:    MRI Results (most recent):    Results from Hospital Encounter encounter on 07/14/17   MRI PELV WO CONT   Narrative MRI OF THE ABDOMEN , PELVIS, AND MRCP WITHOUT CONTRAST. INDICATION: restaging of disease   HISTORY (per electronic medical record): Recurrent, metastatic colon carcinoma. HOLLIS wild-type, HER-2 mutated. Partial colectomy May 2013 with negative lymph  nodes; local recurrence in January 2016. COMPARISON: 5/5/2017, 5/20/2013, CT chest abdomen pelvis 1/23/2017, 1/16/2016. TECHNIQUE: Multisequence and multiplanar MRI was performed of the abdomen and  pelvis without contrast. Heavily T2-weighted thick slab and thin slice images  were obtained in the oblique coronal plane through the biliary tree (MRCP). Evaluation of the solid organs is less sensitive without IV contrast. The  patient refused IV contrast.    Please note: MRI of the abdomen and MRI of the pelvis are separately tailored  evaluations.  These do not substitute for or provide the same staging evaluation  as a CT of the abdomen and pelvis, and a portion of the mid abdomen can be  excluded from the field-of-view between the abdomen MRI and pelvis MRI. FINDINGS:     LIVER: Hepatic metastases have slightly increased. The largest metastasis is a  collision of 3 adjacent metastases in segment 8. It measures 23 x 50 mm on image  14-18 today (versus 18 x 44 mm on image  on 5/5/2017). The centrally  located lesion anterior to the IVC at the junction of segments 1 and 8 (14-30,  14-31) now measures 17 x 24 mm. On 5/5/2017, it measured at least 12 x 6 mm on  , and may represent the conglomerate of 2 adjacent or a single lobulated  metastasis measuring 15 x 6 mm. A subcapsular segment 8 metastasis measuring 12  x 10 mm on images 14-32 and 14-30 was not clearly visible previously. A segment  5-8 junction metastasis in the anterior subcapsular aspect now measures 13 x 18  mm on images 14-39 and 14-40 (9 x 12 mm previously on 10-96). A segment 5  metastasis on image 14-46 now measures 11 x 11 mm (previously 5 x 6 mm on  10-87). A new segment 3 metastasis measures 8 x 9 mm on image 14-46. PERITONEUM: Right upper quadrant omental metastases are grossly stable. The  spatial resolution of MRI is not great enough to distinguish small differences. Trace perihepatic ascites is stable. MRCP: Narrowing/waisting of the common bile duct in the dary hepatis (10-23,  11-3) has increased from 5/5/2017 and is new from 5/20/2013. The increase is  less conspicuous on image 15-9. There is a mildly enlarged lymph node in the  portacaval region (12-20). This is probably slightly bigger than 1/23/2017 and  may be the cause of common duct narrowing (extrinsic compression). No  choledocholithiasis. Looping of the pancreatic duct in the uncinate process, a  normal variant. GALLBLADDER: Decompressed.   PANCREAS: Normal.  SPLEEN: Normal.  ADRENALS: Normal.  KIDNEYS: Mild left upper pole cortical scarring. DISTAL ESOPHAGUS: Normal.  STOMACH AND DUODENUM: Normal.  VISUALIZED SMALL BOWEL AND COLON: Post right hemicolectomy. VISUALIZED LUNG BASES: Questionable pulmonary nodules in the right lower lung  field (12-4). Not clearly reproduced on other sequences. BONES: No suspicious lesions. PELVIS: Peritoneal metastases in the pouch of Gume Vianca are probably stable  (10-26, 11-15; 10-37; 10-24) compared to CT 1/23/2017. Peritoneal metastases  anterosuperior to the bladder seen on 1/23/2017 are not as clearly present on  today's study. A left pelvic sidewall lymph node inferior to the external iliac  vessels appears to have increased and now measures 6 mm in short axis (6 x 11 mm  total dimensions on image 11-12, versus 4 x 7 mm on 2-67 on 1/16/2016). A left  external iliac metastasis is stable, measuring 13 mm in short axis on image 11-5  (versus 13 mm on image 2-61 on 1/16/2016). Sigmoid diverticulosis is mild. A T1 and T2 hyperintense lesion in the right  adnexa is probably the right ovary. It is not significantly changed from  1/16/2016. Incidental note is made of S2 Tarlov cysts. Impression IMPRESSION:   1. Increased number and size of hepatic metastases. 2. Grossly stable omental metastases. 3. Stable or slightly increased narrowing/waisting of the common bile duct. 4. Grossly stable pelvic peritoneal carcinomatosis.                 LABS:    Lab Results   Component Value Date/Time    Sodium 139 08/29/2017 02:27 PM    Potassium 3.2 08/29/2017 02:27 PM    Chloride 100 08/29/2017 02:27 PM    CO2 35 08/29/2017 02:27 PM    Anion gap 4 08/29/2017 02:27 PM    Glucose 118 08/29/2017 02:27 PM    BUN 7 08/29/2017 02:27 PM    Creatinine 0.57 08/29/2017 02:27 PM    BUN/Creatinine ratio 12 08/29/2017 02:27 PM    GFR est AA >60 08/29/2017 02:27 PM    GFR est non-AA >60 08/29/2017 02:27 PM    Calcium 9.1 08/29/2017 02:27 PM    Bilirubin, total 8.0 08/29/2017 02:27 PM    AST (SGOT) 129 08/29/2017 02:27 PM    Alk. phosphatase 688 08/29/2017 02:27 PM    Protein, total 8.1 08/29/2017 02:27 PM    Albumin 3.1 08/29/2017 02:27 PM    Globulin 5.0 08/29/2017 02:27 PM    A-G Ratio 0.6 08/29/2017 02:27 PM    ALT (SGPT) 157 08/29/2017 02:27 PM         Assessment:      1. Metastatic recurrent colon carcinoma, 3/2016   HOLLIS wild type, Her 2 mutated, MSI stable    2. Local recurrent of colon carcinoma, 01/2016   T4b N1c,Tumor deposits: Present, all 9 LN -ve    3. Initially diagnosed Colon adenocarcinoma: 05/2013  T4a N0 M0 (Stage II); all 25 LN clear of disease   High risk features - T4a, serosal penetration, involvement of adjacent organs - small bowel and appendix, moderate to poor differentiation     ECOG PS 0  Intent of treatment - palliative    Systemic chemotherapy   FOLFIRI, s/p 3 cycles    Irinotecan reduced 25% d/t neutropenia (5/11/2016)   FOLFIRI + Avastin s/p 8 Cycles     Irinotecan reduced 50% d/t neutropenia (7/6/2016)   5-FU s/p 2 cycles    FOLFIRI + Avastin - s/p 4 Cycles - stopped d/t disease progression    CT Chest, Abd, Pelv (1/23/2017) - disease progression in the liver and intraperitoneal carcinomatosis    Received FOLFOX + Panitumumab  S/p 3 Cycles  Oxaliplatin discontinued d/t acute neuropathy of the recurrent laryngeal nerve resulting in vocal cord paralysis    Received systemic chemotherapy   FOLFIRI + Panitumumab - s/p 2 cycles - discontinued d/t grade III rash     MRI Abdomen (7/2017): disease progression    Lonsurf cycle 2 day 1    D/C due to elevated liver enzymes  Symptom management form reviewed with patient. 2. Right sided abdominal pain     > Rx oxycodone 5-10 mg q 4 hours      3. Facial Rash - resolved    Grade III - from Vectibix  > Resolved      4. Lower extremity edema - L > R    > Doppler bilateral LE - negative       5. Elevated Hepatic panel - liver failure    From progressive disease in the liver - T. Bili - 8   D/C Lonsurf      Plan:        > Referral to home hospice    I saw the patient in conjunction with NEGRITA Tanner. Signed by: Clark Phillips MD                     September 18, 2017          CC. Anupam Peralta MD   CC. Tian Reina MD   CC.  Amaris Andrews MD

## 2017-10-04 NOTE — PROGRESS NOTES
[facility] short consult note:  1310 Pt arrived to [facility] via w[facility] for [place] accompanied by son. Pt in extreme amount of pain. When asked pt about long and short acting pain meds she was unsure what she had. Pt did have lorazapm liquid that she was going to start taking. Pt reports being unable to sleep because the pain is so bad. Pt having hospital bed delivered today and hopes that will help. Encouraged pt/son to get in touch with hospice and make sure she was on the proper pain med. Port accessed per protocol. Positive blood return noted. Port flushed and de-accessed. BP 99/70  Pulse 98  Temp 97.4 °F (36.3 °C)  Resp 20  Medication given:  none  1320 Discharged home ambulatory and in no distress. Tolerated procedure well. Next appointment 11/1/17.

## 2017-10-12 NOTE — HOSPICE
[facility] Note: Patient is declining rapidly, per hospice RN. She came to the hospital under Respite LOC. Patient's family in the room with her this afternoon including her  and son Chanelle Wiseman. Two of her grandkids also present (pre-teen/ teen). [address] daughter lying in bed with patient. Grandson in room, on floor. Both seemed very comfortable being there. Patient is unresponsive, had nausea this morning, apparently has mottling of feet per RN. Explained to Chanelle Wiseman that patient is declining. He indicated that this was happening at home as well. He understands that patient's time is limited.  also noted that he is \"okay\", no needs at this time. Family is keeping chun. Hospice team provided education and emotional support to family. We will continue to follow patient and provide support as appropriate. A comfort cart was ordered for family as they were planning to stay with patient for some time this evening.

## 2017-10-12 NOTE — HOSPICE
[facility]  [phone]    Respite Nursing Note   Patient Name: Marilyn Adler  YOB: 1945  Age: 67 y.o. Date of Visit: 10/12/17  Facility of Care: [facility]  Patient Room: [facility]/     Hospice Attending: Chino Ovalles MD  Hospice Diagnosis: Colon Cancer  Colon cancer [facility])    Level of Care: Respite    ASSESSMENT & PLAN     1. Patient admitted to Respite level of care due to:    [x] Caregiver Exhaustion   [] Caregiver Breakdown  2. Patient with rapid decline over the last few days. This morning complaining of RUQ pain and nausea. Zofran and Roxicodone administered at 10 am with minimal effect. Haloperidol administered at noon. Patient continues to moan, is restless and holding abdomen. Difficulty responding to questions asked but will shake her head yes to pain and nausea. Unit Nurse Eleni Fothergill states patient complained of pain and nausea prior to my visit 10/10. Noted patient reported roxicodone as causing increased nausea overnight. Discussed with Dr. Aidan Vargas received orders to discontinue roxicodone, increased roxinol to 10 mg Q1H PRN and administer now along with dose of SL lorazepam. Orders read back and entered, unit nurse notified of changes and medications to administer. Patient was resting with eyes closed in no apparent discomfort or distress when this nurse visited room at 11:30 AM.        Care Plan and New Orders Discussed / Approved with Aidan Vargas MD.        DISCHARGE PLANNING     1. Discharge Plan: Home with hospice at end of respite stay  2. Patient/Family teaching: medication changes. 3. Response to patient/family teaching: Verbalized understanding. ASSESSMENT    KARNOFSKY: 30    FAST: n/a      Quality Measure: Patient self-reports:  [x]  Yes   []  No    ESAS:   Time of Assessment: 12:30 PM  Pain (1-10): 10  Fatigue (1-10):   Shortness of breath (1-10): 0  Nausea (1-10): 10  Appetite (1-10):    Anxiety: (1-10):   Depression: (1-10): Well-being: (1-10):   Constipation: _ Yes  _ No    CLINICAL INFORMATION   Patient Vitals for the past 12 hrs:   Temp Pulse Resp BP SpO2   10/12/17 0827 97.7 °F (36.5 °C) 89 15 98/58 95 %         SIGNS/PHYSICAL FINDINGS     Skin:  [x] Warm, dry, supple, intact and color normal for race  [] Warm   [] Dry   [] Cool     [] Clammy       [] Diaphoretic    Turgor   [x] Normal   [] Decreased  Color:   [x] Pink   [] Pale   [] Cyanotic   [] Erythema   [] Jaundice   [] Normal for Race  []  Wounds:    Neuro:  [] Lethargy  [x] Restlessness / agitation  [] Confusion / delirium  [] Hallucinations  [] Responds to maximal stimulation  [] Unresponsive  [] Seizures     Cardiac:  [] Dyspnea on Exertion  [] JVD  [] Murmur  [] Palpitations  [] Hypotension  [] Hypertension  [] Tachycardia  [] Bradycardia  [] Irregular HR  [] Pulses Decreased  [] Pulses Absent  [] Edema:         [x] Mottling:  toes    Respiratory:  Breath sounds:    [] Diminished   [] Wheeze   [] Rhonchi   [] Rales   [x] Even and unlabored  [] Labored:            [] Cough   [] Non Productive   [] Productive    [] Description:           [] Deep suctioned   [] O2 at ___ LPM  [] High flow oxygen greater than 10 LPM  [] Bi-Pap    GI:  [x] Abdomen tender, guarding   [] Ascites  [x] Nausea  [] Vomiting  [] Incontinent of bowels  [] Bowel sounds (yes/no)  [] Diarrhea  [] Constipation (see above including last bowel movement)  [] Checked for impaction  [] Last BM 10/9/2017      Nutrition  Diet: regular as tolerated  Appetite:   [] Good   [] Fair   [] Poor   [] Tube Feeding     :  [] Voiding  [] Incontinent   [x] [place]    Musculoskeletal  [] Balance/[place] Unsteady   [x] Weak   Strength:    [] Normal    [] Limited    [x] Decreasing   Activities:    [] Up as tolerated   [x] Bedridden    [] Specify:    SAFETY  [] 24 hr. Caregiver   [] Side rails ? [] Hospital bed   [x] Reviewed Falls & Safety     ALLERGIES AND MEDICATIONS     Allergies:    Allergies   Allergen Reactions    Beef Containing Products Anaphylaxis    Heparin (Porcine) Hives     Heparin beef starts with hives and can lead to anaphylatic shock    Iodinated Contrast- Oral And Iv Dye Rash, Nausea and Vomiting and Other (comments)     Abdominal pain    Pork/Porcine Containing Products Anaphylaxis    Augmentin [Amoxicillin-Pot Clavulanate] Rash     Full body rash, NV    Bactrim [Sulfamethoprim Ds] Hives    Erythromycin Hives and Itching    Ciprofloxacin Unknown (comments)     Splitting headache    Macrobid [Nitrofurantoin Monohyd/M-Cryst] Other (comments)     Headaches, bad dreams    Oxaliplatin Other (comments)     Vocal Cord Paralysis        Current Facility-Administered Medications   Medication Dose Route Frequency    morphine (ROXANOL) 100 mg/5 mL (20 mg/mL) concentrated solution 10 mg  10 mg Oral Q1H PRN    acetaminophen (TYLENOL) tablet 500 mg  500 mg Oral Q6H PRN    acetaminophen (TYLENOL) suppository 650 mg  650 mg Rectal Q4H PRN    bisacodyl (DULCOLAX) suppository 10 mg  10 mg Rectal DAILY PRN    sodium phosphate (FLEET'S) enema 118 mL  1 Enema Rectal PRN    haloperidol (HALDOL) 2 mg/mL oral solution 2 mg  2 mg SubLINGual Q6H PRN    Or    haloperidol lactate (HALDOL) injection 2 mg  2 mg IntraVENous Q6H PRN    haloperidol (HALDOL) 2 mg/mL oral solution 0.5 mg  0.5 mg Oral Q4H PRN    LORazepam (INTENSOL) 2 mg/mL oral concentrate 0.5 mg  0.5 mg Oral Q1H PRN    ondansetron (ZOFRAN ODT) tablet 4 mg  4 mg Oral Q8H PRN    polyethylene glycol (MIRALAX) packet 17 g  17 g Oral DAILY    scopolamine (TRANSDERM-SCOP) 1.5 mg  1.5 mg TransDERmal Q72H PRN    senna-docusate (PERICOLACE) 8.6-50 mg per tablet 2 Tab  2 Tab Oral BID          Visit Time In: 12:30 PM  Visit Time Out: 1:30 PM

## 2017-10-12 NOTE — PROGRESS NOTES
Spiritual Care Assessment/Progress Notes    Eugenia Pavon [id number]  [id number]    1945  67 y.o.  female    Patient Telephone Number: [phone] (home)   Christian Affiliation: Imtiaz Martinez   Language: [place]   Extended Emergency Contact Information  Primary Emergency Contact: [address] Phone: [phone]  Mobile Phone: [phone]  Relation: Child  Secondary Emergency Contact: [address] Phone: [phone]  Relation: Child   Patient Active Problem List    Diagnosis Date Noted    [facility] 04/05/2017    History of UTI 03/15/2017    Alpha galactosidase deficiency 03/15/2017    Urticaria of unknown origin 03/15/2017    Anaphylactic shock 03/14/2017    Cellulitis of face 03/08/2017    Urine blood 10/17/2016    Nausea & vomiting 04/15/2016    Metastatic colon cancer in female [facility]) 03/31/2016    Right upper quadrant abdominal pain 03/31/2016    Colon cancer ([facility] 05/24/2013        Date: 10/12/2017       Level of Christian/Spiritual Activity:  [x]         Involved in sandra tradition/spiritual practice    []         Not involved in sandra tradition/spiritual practice  [x]         Spiritually oriented    []         Claims no spiritual orientation    []         seeking spiritual identity  []         Feels alienated from Jainism practice/tradition  []         Feels angry about Jainism practice/tradition  []         Spirituality/Jainism tradition is a resource for coping at this time.   [x]         Not able to assess due to medical condition    Services Provided Today:  []         crisis intervention    []         reading Scriptures  [x]         spiritual assessment    [x]         prayer  [x]         empathic listening/emotional support  []         rites and rituals (cite in comments)  []         life review     []         Jainism support  []         theological development   []         advocacy  []         ethical dialog     [] blessing  []         bereavement support    [x]         support to family  []         anticipatory grief support   []         help with AMD  []         spiritual guidance    []         meditation      Spiritual Care Needs  [x]         Emotional Support  []         Spiritual/Anabaptist Care  []         Loss/Adjustment  []         Advocacy/Referral                /Ethics  []         No needs expressed at               this time  []         Other: (note in               comments)  [address]  []         Follow up visits with               pt/family  []         Provide materials  []         Schedule sacraments  []         Contact Community               Clergy  [x]         Follow up as needed  []         Other: (note in               comments)     Comments:   Visited patient's room on Oncology unit. Patient was sleeping soundly. Patient's son stepped out of the room to speak with me. Son shared that they have good support from a Religious family who is praying for them. Offered assurance of prayer. Advised of  availability. Music therapist will visit on Monday, Oct. 16th if patient is hospitalized at that time.   CHRIS Remy, [facility], [address]    [address] Paging Service  [phone]

## 2017-10-12 NOTE — PROGRESS NOTES
Oncology Nursing Communication Tool      8:25 PM  10/11/2017     Bedside shift change report given to Melanie Orona RN (incoming nurse) by Adria Capone (outgoing nurse) on Eric Gordoner a 67 y.o. female who was admitted on 10/11/2017  6:25 PM. Report included the following information SBAR, Kardex, Intake/Output, MAR and Recent Results. Significant changes during shift:       Issues for physician to address:             Code Status: DNR     Infections: No current active infections     Allergies: Beef containing products; Heparin (porcine); Iodinated contrast- oral and iv dye; Pork/porcine containing products; Augmentin [amoxicillin-pot clavulanate]; Bactrim [sulfamethoprim ds]; Erythromycin; Ciprofloxacin; Macrobid [nitrofurantoin monohyd/m-cryst]; and Oxaliplatin     Current diet: DIET FULL LIQUID       Pain Controlled [] yes [] no   Bowel Movement [] yes [] no   Last Bowel Movement (date)                 Vital Signs:   Patient Vitals for the past 12 hrs:   Temp Pulse Resp BP SpO2   10/11/17 1835 97 °F (36.1 °C) 94 16 100/49 97 %      Intake & Output:   No intake or output data in the 24 hours ending 10/11/17 2025   Laboratory Results:   No results found for this or any previous visit (from the past 12 hour(s)). Opportunity for questions and clarifications were given to the incoming nurse. Patient's bed is in low position, side rails x2, door open PRN, call bell within reach and patient not in distress.       Adria Capone

## 2017-10-12 NOTE — PROGRESS NOTES
Admission skin assessment completed by RNs Jessica Eid and Rosaura Escamilla.  Abrasion with scab noted to R ear lobe, band-aid present on arrival falling off so removed. Heels and sacrum intact. Severe bruising noted to perineum and upper thigh on L, extended behind up toward sacrum (red, purple, brown). Scant blood evident in perineum, no broken skin noted. Patient stated she had fallen on toilet seat at home. Monzon inserted for comfort care, ~200cc serosanguinous blood drained immediately on insertion. Reported all to nurse manager Alison Moran.

## 2017-10-12 NOTE — PROGRESS NOTES
Oncology Nursing Communication Tool      7:54 AM  10/12/2017     Bedside shift change report given to Richard Zamora RN (incoming nurse) by Martir Duque RN (outgoing nurse) on Noah Quezadaor a 67 y.o. female who was admitted on 10/11/2017  6:25 PM. Report included the following information SBAR, Kardex, Procedure Summary, Intake/Output, MAR and Recent Results. Significant changes during shift: None      Issues for physician to address: None          Code Status: DNR     Infections: No current active infections     Allergies: Beef containing products; Heparin (porcine); Iodinated contrast- oral and iv dye; Pork/porcine containing products; Augmentin [amoxicillin-pot clavulanate]; Bactrim [sulfamethoprim ds]; Erythromycin; Ciprofloxacin; Macrobid [nitrofurantoin monohyd/m-cryst]; and Oxaliplatin     Current diet: DIET FULL LIQUID       Pain Controlled [x] yes [] no   Bowel Movement [] yes [x] no   Last Bowel Movement (date) 10/09/2017            Vital Signs:   No data found. Intake & Output:     Intake/Output Summary (Last 24 hours) at 10/12/17 0754  Last data filed at 10/11/17 2351   Gross per 24 hour   Intake                0 ml   Output              680 ml   Net             -680 ml      Laboratory Results:   No results found for this or any previous visit (from the past 12 hour(s)). Opportunity for questions and clarifications were given to the incoming nurse. Patient's bed is in low position, side rails x2, door open PRN, call bell within reach and patient not in distress.       Martir Duque RN

## 2017-10-12 NOTE — H&P
[facility]  [phone]    Patient Name: Marilyn Adler  YOB: 1945    Date of Provider Hospice Visit: 10/11/17    Level of Care:   [] General Inpatient (GIP)    [] Routine   [x] Respite    Location of Care:  [] [facility] [place] [] [facility] [x] [address] [] [facility] [] Hosp[facility]    Date of [facility] Admission: 9-6-17  Hospice Medical Director for Inpatient Care: Dr. Latonya Nichole Diagnosis: Metastatic colon cancer  Diagnoses RELATED to the terminal prognosis: jaundice, LFT elevation  Other Diagnoses: RUQ pain, neuropathy, nausea/vomting     HOSPICE SUMMARY   Do not cut and paste chart information other than imaging findings    Marilyn Adler is a 67y.o. year old who was admitted to [address] [place] [address]. The patient's principle diagnosis of Colon Ca has resulted in further progression of disease and spread to involve liver and peritoneum. Pt has undergone 2 surgeries and received chemotherapy but tumor has still progressed and metastasized. Pt with gradaually worsening fatigue, pain in abdomen, constipation and anorexia.       Functionally, the patient's Karnofsky and/or Palliative Performance Scale has declined over a period of months and is estimated at 50%. The patient is dependent on the following ADLs:fairly independent but getting weaker     Objective information that support this patients limited prognosis includes: diffuse metastasis in abdomen, weight: 147lb. Reports 50lb loss x 1year         Pt admitted respite today at [address] for CG breakdown  Pt lives with her son. Has had rapid decline in last few days and has been become severely weak and unable to get OOB  Has 7/10 RUQ pain taking roxanol 5mg about every 6 hrs   C/o nausea and dry heaves last few days  Has become deeply jaundiced            HOSPICE DIAGNOSES   Active Symptoms:  1. Nausea/dry heaves  2. RUQ abdominal pain     PLAN   1. Admit to [address] respite x 5 days  2.  Continue home medications--might need to schedule roxanol and titrate dose to 10 mg  3. Monzon  4. [place] odt prn  5. Sips and diet as tolerated    6.  and SW to support family needs   7. Disposition: RN and SW f/u. Consider [facility] if stable for transport at end of respite  Prognosis estimated based on 10/11/17 clinical assessment is:   [] Hours to Days    [x] Days to Weeks    [] Other:    Communicated plan of care with: Hospice Case Manager;  Hospice IDT; Care Team     GOALS OF CARE     Resuscitation Status: DNR  Durable DNR: [] Yes [] No    Advance Care Planning 4/5/2017   Patient's Healthcare Decision Maker is: Legal Next of Nanci Otero   Primary Decision Maker Name Catie Yang   Primary Decision Maker Relationship to Patient Adult child   Confirm Advance Directive None   Patient Would Like to Complete Advance Directive -   Does the patient have other document types -        HISTORY     History obtained from: chart      CHIEF COMPLAINT: c/o 7/10 RUQ pain, nauseated with dry heaves  The patient is:   [] Verbal  [x] Nonverbal  [] Unresponsive    HPI/SUBJECTIVE:    As per above  Very weak , unable to get OOB last few days  Nausea /dry heaves  RUQ pain 7/10       REVIEW OF SYSTEMS     The following systems were: [x] reviewed  [] unable to be reviewed    Positive ROS include:  Constitutional: fatigue, weakness, in pain,   Ears/nose/mouth/throat:   Respiratory:no shortness of breath, wheezing  Gastrointestinal:poor appetite, nausea, vomiting, abdominal sally  Musculoskeletal:pain, deformities, swelling legs  Neurologic:confusion, hallucinations, weakness  Psychiatric:anxiety, feeling depressed, poor sleep  Endocrine:     Adult Non-Verbal Pain Assessment Score: 3    Face  [] 0   No particular expression or smile  [] 1   Occasional grimace, tearing, frowning, wrinkled forehead  [x] 2   Frequent grimace, tearing, frowning, wrinkled forehead    Activity (movement)  [x] 0   Lying quietly, normal position  [] 1   Seeking attention through movement or slow, cautious movement  [] 2   Restless, excessive activity and/or withdrawal reflexes    Guarding  [] 0   Lying quietly, no positioning of hands over areas of body  [x] 1   Splinting areas of the body, tense  [] 2   Rigid, stiff    Physiology (vital signs)  [x] 0   Stable vital signs  [] 1   Change in any of the following: SBP > 20mm Hg; HR > 20/minute  [] 2   Change in any of the following: SBP > 30mm Hg; HR > 25/minute    Respiratory  [x] 0   Baseline RR/SpO2, compliant with ventilator  [] 1   RR > 10 above baseline, or 5% drop SpO2, mild asynchrony with ventilator  [] 2   RR > 20 above baseline, or 10% drop SpO2, asynchrony with ventilator     FUNCTIONAL ASSESSMENT     Palliative Performance Scale (PPS):30     PSYCHOSOCIAL/SPIRITUAL ASSESSMENT     Active Problems:    Colon cancer ([address] (2013)      Past Medical History:   Diagnosis Date    Anemia 5-3-13    Hgb 7.4         received transfuion of two units    Anemia NEC     Arthritis     mild- low back    Cancer (HCC)     colon cancer    GERD (gastroesophageal reflux disease)     at times    Liver disease     spots on liver    Neuropathy     Thyroid disease     nodules      Past Surgical History:   Procedure Laterality Date    ABDOMEN SURGERY PROC UNLISTED  16    lap resection of abdominal mass with ileocolic/small bowel resection.     COLONOSCOPY N/A 2016    COL[facility]    HX CERVICAL FUSION      anterior cervical disc and fusion    HX  SECTION      HX GI  13    COLON RESECTION LAPAROSCOPIC RIGHT     HX VASCULAR ACCESS      Portacath      Social History   Substance Use Topics    Smoking status: Former Smoker     Packs/day: 1.00     Years: 20.00     Quit date: 1988    Smokeless tobacco: Never Used      Comment: quit 30 yrs ago    Alcohol use 7.0 oz/week     14 Cans of beer per week      Comment: couple beers/day     Family History   Problem Relation Age of Onset    Cancer Sister      colon    Colon Cancer Sister     Diabetes Mother     Heart Disease Father     Hypertension Father     Stroke Father     Diabetes Father     Colon Cancer Sister     Cancer Sister      lung      Allergies   Allergen Reactions    Beef Containing Products Anaphylaxis    Heparin (Porcine) Hives     Heparin beef starts with hives and can lead to anaphylatic shock    Iodinated Contrast- Oral And Iv Dye Rash, Nausea and Vomiting and Other (comments)     Abdominal pain    Pork/Porcine Containing Products Anaphylaxis    Augmentin [Amoxicillin-Pot Clavulanate] Rash     Full body rash, NV    Bactrim [Sulfamethoprim Ds] Hives    Erythromycin Hives and Itching    Ciprofloxacin Unknown (comments)     Splitting headache    Macrobid [Nitrofurantoin Monohyd/M-Cryst] Other (comments)     Headaches, bad dreams    Oxaliplatin Other (comments)     Vocal Cord Paralysis       Current Facility-Administered Medications   Medication Dose Route Frequency    acetaminophen (TYLENOL) tablet 500 mg  500 mg Oral Q6H PRN    acetaminophen (TYLENOL) suppository 650 mg  650 mg Rectal Q4H PRN    bisacodyl (DULCOLAX) suppository 10 mg  10 mg Rectal DAILY PRN    sodium phosphate (FLEET'S) enema 118 mL  1 Enema Rectal PRN    haloperidol (HALDOL) 2 mg/mL oral solution 2 mg  2 mg SubLINGual Q6H PRN    Or    haloperidol lactate (HALDOL) injection 2 mg  2 mg IntraVENous Q6H PRN    haloperidol (HALDOL) 2 mg/mL oral solution 0.5 mg  0.5 mg Oral Q4H PRN    LORazepam (INTENSOL) 2 mg/mL oral concentrate 0.5 mg  0.5 mg Oral Q1H PRN    ondansetron (ZOFRAN ODT) tablet 4 mg  4 mg Oral Q8H PRN    oxyCODONE IR (ROXICODONE) tablet 10 mg  10 mg Oral Q4H PRN    [START ON 10/12/2017] polyethylene glycol (MIRALAX) packet 17 g  17 g Oral DAILY    scopolamine (TRANSDERM-SCOP) 1.5 mg  1.5 mg TransDERmal Q72H PRN    senna-docusate (PERICOLACE) 8.6-50 mg per tablet 2 Tab  2 Tab Oral BID    morphine (ROXANOL) 100 mg/5 mL (20 mg/mL) concentrated solution 5 mg  5 mg Oral Q1H PRN        PHYSICAL EXAM     Wt Readings from Last 3 Encounters:   08/31/17 66.7 kg (147 lb)   08/30/17 66.9 kg (147 lb 6.4 oz)   08/21/17 68.7 kg (151 lb 6.4 oz)       Visit Vitals    /49    Pulse 94    Temp 97 °F (36.1 °C)    Resp 16    SpO2 97%       Supplemental O2  [] Yes  [x] NO  Last bowel movement:     Currently this patient has:  [] Peripheral IV [] PICC  [] PORT [] ICD    [x] Monzon Catheter [] NG Tube   [] PEG Tube    [] Rectal Tube [] Drain  [] Other:     Constitutional: appears weak, slow verbal responses, mild confusion, deeply jaundiced  Eyes: scleral icterus  ENMT:dry   Cardiovascular: rrr  Respiratory: clear  Gastrointestinal: soft, TTP RUQ.  No ascites  Musculoskeletal:2 plus b/l LE edema  Skin:deeply jaundiced  Neurologic:alert but appears slightly confused  Psychiatric:   Other:       Pertinent Lab and or Imaging Tests:  Lab Results   Component Value Date/Time    Sodium 139 08/29/2017 02:27 PM    Potassium 3.2 08/29/2017 02:27 PM    Chloride 100 08/29/2017 02:27 PM    CO2 35 08/29/2017 02:27 PM    Anion gap 4 08/29/2017 02:27 PM    Glucose 118 08/29/2017 02:27 PM    BUN 7 08/29/2017 02:27 PM    Creatinine 0.57 08/29/2017 02:27 PM    BUN/Creatinine ratio 12 08/29/2017 02:27 PM    GFR est AA >60 08/29/2017 02:27 PM    GFR est non-AA >60 08/29/2017 02:27 PM    Calcium 9.1 08/29/2017 02:27 PM     Lab Results   Component Value Date/Time    Protein, total 8.1 08/29/2017 02:27 PM    Albumin 3.1 08/29/2017 02:27 PM           Total time: 50min  Counseling / coordination time: d/w hospce RN and family/daughter and bedside  > 50% counseling / coordination?: no

## 2017-10-12 NOTE — PROGRESS NOTES
Brief Nutrition Note    Chart reviewed, admitted respite care. Stopped by to see if nutrition could provided anything or if nutrition supplements were desired. Pt sleeping and family was present. They declined supplements stating pt currently not eating. No nutrition intervention. Please consult if needed.     Juan Huber RDN  Pager: [phone]

## 2017-10-12 NOTE — PROGRESS NOTES
Oncology Nursing Communication Tool      7:53 PM  10/12/2017     Bedside and Verbal shift change report given to Willi Murry RN (incoming nurse) by Alicia Sawyer (outgoing nurse) on Tanesha Patricia a 67 y.o. female who was admitted on 10/11/2017  6:25 PM. Report included the following information SBAR, Kardex, Procedure Summary, Intake/Output, MAR and Accordion. Significant changes during shift: pt became less oriented and more agitated throughout shift. Issues for physician to address: same as above          Code Status: DNR     Infections: No current active infections     Allergies: Beef containing products; Heparin (porcine); Iodinated contrast- oral and iv dye; Pork/porcine containing products; Augmentin [amoxicillin-pot clavulanate]; Bactrim [sulfamethoprim ds]; Erythromycin; Ciprofloxacin; Macrobid [nitrofurantoin monohyd/m-cryst]; and Oxaliplatin     Current diet: DIET FULL LIQUID  DIET ONE TIME MESSAGE       Pain Controlled [] yes [x] no   Bowel Movement [] yes [] no   Last Bowel Movement (date)                 Vital Signs:   Patient Vitals for the past 12 hrs:   Temp Pulse Resp BP SpO2   10/12/17 0827 97.7 °F (36.5 °C) 89 15 98/58 95 %      Intake & Output:     Intake/Output Summary (Last 24 hours) at 10/12/17 1953  Last data filed at 10/11/17 2351   Gross per 24 hour   Intake                0 ml   Output              680 ml   Net             -680 ml      Laboratory Results:   No results found for this or any previous visit (from the past 12 hour(s)). Opportunity for questions and clarifications were given to the incoming nurse. Patient's bed is in low position, side rails x2, door open PRN, call bell within reach and patient not in distress.       Alicia Sawyer

## 2017-10-13 PROCEDURE — 77010033678 HC OXYGEN DAILY

## 2017-10-13 NOTE — PROGRESS NOTES
Death Note    Called to pronounce patient. No response to noxious stimuli. No spontaneous breath sounds nor cardiac sounds. TOD: 21:15  Family is present at bedside.     D/C Summary and death certificate to be completed by Attending MD

## 2017-10-13 NOTE — PROGRESS NOTES
2115: Pt in bed with absent breath and heart sounds. Family at bedside, condolences offered. Nursing supervisor made aware of pts passing, on-call  paged. 2142: Dr Ld Alrbight at bedside to pronounce.  Cayla to bedside. 26: Pt's  at bedside, waiting for pt's son (RAMÓN) Henri to visit bedside. 0000: Family currently remaining at bedside. 0100: Family leaving bedside. 0201: Nicolás d/c'd. Post-mortem care provided.

## 2017-10-17 NOTE — DISCHARGE SUMMARY
Hospice Discharge Summary    [address] [place] [address]  [facility]        Date of Admission: 10/11/2017  Date of Discharge: 10/12/2017    Sandra Shafer is a 67y.o. year old who was admitted to [address] [place] [address] at [facility] with a Hospice diagnosis of Colon Cancer;Colon cancer [facility]). The patient's care was focused on comfort and the patient passed away on 10/12/2017. A   The patient's principle diagnosis of Colon Ca has resulted in further progression of disease and spread to involve liver and peritoneum. Pt has undergone 2 surgeries and received chemotherapy but tumor has still progressed and metastasized. Pt with gradaually worsening fatigue, pain in abdomen, constipation and anorexia.       Functionally, the patient's Karnofsky and/or Palliative Performance Scale has declined over a period of months and is estimated at 50%. The patient is dependent on the following ADLs:fairly independent but getting weaker      Objective information that support this patients limited prognosis includes: diffuse metastasis in abdomen, weight: 147lb. Reports 50lb loss x 1year          Pt admitted respite today at [facility] for CG breakdown  Pt lives with her son. Has had rapid decline in last few days and has been become severely weak and unable to get OOB  Has 7/10 RUQ pain taking roxanol 5mg about every 6 hrs   C/o nausea and dry heaves last few days  Has become deeply jaundiced      dd in Hospice Summary through Plan from most recent Progress Note    .

## 2017-11-01 ENCOUNTER — APPOINTMENT (OUTPATIENT)
Dept: INFUSION THERAPY | Age: 72
End: 2017-11-01

## 2017-11-29 ENCOUNTER — APPOINTMENT (OUTPATIENT)
Dept: INFUSION THERAPY | Age: 72
End: 2017-11-29

## 2019-01-16 NOTE — PROGRESS NOTES
[facility]  Pulmonary, Critical Care, and Sleep Medicine    Name: Elizabeth Kaye MRN: [id number]   : 1945 Hospital: [facility]   Date: 2017        IMPRESSION:   · Acute respiratory failure - airway protection  · Stridor - vocal cord paralysis - ?due to metastatic tumor vs idiosyncratic reaction to chemo  · Metastatic colon cancer  · Facial rash  · Hypokalemia       PLAN:   · ENT evaluation appreciated  · MRI to be done - ?metastases  · D/cNGT   · Advance diet to liquids as tolerated  · May transfer to floor   · Steroids   · Hold doxycycline in case this was an allergic reaction (though seems less likely at this point)  · PPI  · DVT prophylaxis with SCDs only (heparin allergy)     Subjective/Interval History:   I have reviewed the flowsheet and previous days notes. 17 off vent. NGt bothers throat. Swallows ok. No SOB. No stridor on exam. Lungs clear. Able to vocalize well  17 Appreciate Dr. Geovanny Walter help. Taken to OR in AM. Extubated and observed. No need for trach. Now back in CCU on venti mask. Soft voice but no stridor. Lungs clear. Review of Systems   Unable to perform ROS: Other   Respiratory: Negative for shortness of breath. Cardiovascular: Negative for chest pain. Gastrointestinal: Negative for nausea.      Objective:   Vital Signs:    Visit Vitals    /65    Pulse 68    Temp 97.8 °F (36.6 °C)    Resp 13    Wt 75 kg (165 lb 5.5 oz)    SpO2 96%    BMI 27.51 kg/m2       O2 Device: Room air   O2 Flow Rate (L/min): 12 l/min   Temp (24hrs), Av.9 °F (36.6 °C), Min:97.2 °F (36.2 °C), Max:98.5 °F (36.9 °C)       Intake/Output:   Last shift:         Last 3 shifts: 1 -  07  In: 3182.3 [I.V.:2422.3]  Out: 5035 [Urine:3355]    Intake/Output Summary (Last 24 hours) at 17 1133  Last data filed at 17 0600   Gross per 24 hour   Intake             1600 ml   Output             2075 ml   Net             -475 ml     Hemodynamics:   PAP:   CO:     Wedge:   CI:     CVP:    SVR:       PVR:       Ventilator Settings:  Mode Rate Tidal Volume Pressure FiO2 PEEP   Assist control   500 ml  5 cm H2O 40 % 5 cm H20     Peak airway pressure: 22 cm H2O    Minute ventilation: 8.4 l/min       Physical Exam   Constitutional: No distress. She is sedated and intubated. HENT:   Head: Normocephalic and atraumatic. Mouth/Throat: No oropharyngeal exudate. Eyes: No scleral icterus. Cardiovascular: Normal rate and regular rhythm. No murmur heard. Pulmonary/Chest: She is intubated. No respiratory distress. She has no wheezes. Abdominal: Soft. Bowel sounds are normal. She exhibits no distension. There is no tenderness. Musculoskeletal: She exhibits no edema. Skin: Skin is warm and dry. Rash (on face) noted.      Data:     Current Facility-Administered Medications   Medication Dose Route Frequency    chlorhexidine (PERIDEX) 0.12 % mouthwash 15 mL  15 mL Oral Q12H    famotidine (PF) (PEPCID) 20 mg in sodium chloride 0.9 % 10 mL injection  20 mg IntraVENous Q12H    sodium chloride (NS) flush 5-10 mL  5-10 mL IntraVENous Q8H    0.9% sodium chloride infusion  50 mL/hr IntraVENous CONTINUOUS                Labs:  Recent Labs      04/09/17   0428  04/08/17   0538  04/07/17   0642   WBC  6.8  8.6  11.8*   HGB  12.2  11.7  11.4*   HCT  38.0  35.9  35.8   PLT  140*  118*  106*     Recent Labs      04/09/17   0428  04/08/17   0538  04/07/17   0642   NA  142  144  147*   K  3.1*  3.4*  3.7   CL  106  109*  113*   CO2  26  23  25   GLU  85  80  93   BUN  8  9  10   CREA  0.39*  0.48*  0.51*   CA  8.5  8.3*  7.9*   MG  2.1  2.1  2.1   PHOS  2.7  2.1*  2.2*     Recent Labs      04/08/17   1205  04/08/17   0629  04/07/17   0600   PH  7.44  7.43  7.49*   PCO2  39  36  36   PO2  206*  186*  175*   HCO3  26  23  26   FIO2  40  40  40       Imaging:  I have personally reviewed the patients radiographs and have reviewed the reports:  No change, 58y female with chronic hypoxemic respiratory failure, COPD, chronic respiratory insuffiencey s/p tracheostomy on pressure support ventilation at home now s/p stomatoplasty and bronch for tracheomalacia patient in pacu on mechanical ventilation, SICU consulted for being on the ventilator.     PLAN:   Neurologic:   - Tylenol PRN pain,   - klonopin for chronic anxiety   Respiratory:   - Continue pressure support ventilation, continue home nebulizers  Cardiovascular:   - Continue home medications, amlodipine and losartan for HTN    Gastrointestinal/Nutrition:  -Mechanical soft diet  -Protonix daily for GERD  -Continue bowel regimen  Renal/Genitourinary:   -IVL  -Replete electrolytes PRN  -Monitor intake & output   Hematologic:   -SQH for VTE ppx  Infectious Disease:   -On chronic azithromycin   Tubes/Lines/Drains: PIV  Endocrine:   -No active issues   Disposition: SICU, possible d/c home per ENT    --------------------------------------------------------------------------------------    Critical Care Diagnoses: Respiratory failure requiring mechanical ventilation no acute        Total critical care time exclusive of procedures: 20 minutes  Jose Umanzor MD

## 2021-09-19 NOTE — ANESTHESIA POSTPROCEDURE EVALUATION
Post-Anesthesia Evaluation and Assessment    Patient: Micah Light MRN: [id number]  SSN: [id number]    YOB: 1945  Age: 70 y.o. Sex: female       Cardiovascular Function/Vital Signs  Visit Vitals    /73    Pulse (!) 103    Temp 36.9 °C (98.4 °F)    Resp 18    SpO2 100%       Patient is status post general anesthesia for Procedure(s):  EMERGENT CONTROLLED INTUBATION. Nausea/Vomiting: None    Postoperative hydration reviewed and adequate. Pain:      Managed    Neurological Status: At baseline    Mental Status and Level of Consciousness: Sedated    Pulmonary Status:   O2 Device: Other (comment) (intubated) (04/05/17 8104)   Adequate oxygenation and airway patent  Remains intubated/sedated/mechanically ventilated for stridor of unknown origin. Complications related to anesthesia: None    Post-anesthesia assessment completed. This patient remains seriously ill.     Signed By: Mee Julian MD     April 5, 2017
yes

## 2022-07-14 NOTE — CDMP QUERY
Dr. Jacob Benson :  Please clarify if this patient is being treated/managed for:    => respiratory alkalosis ( PCO2 low at 32 and PH 7.55) req vent, abgs, steroids  =>Other Explanation of clinical findings  =>Unable to Determine (no explanation of clinical findings)    The medical record reflects the following clinical findings, treatment, and risk factors:    Risk Factors: 71F adm w/ Acute respiratory failure Secondary to Chemo related neurotoxicity   Clinical Indicators: PCO2 low at 32 and PH 7.55  Treatment:  vent, abgs, steroids    Please clarify and document your clinical opinion in the progress notes and discharge summary including the definitive and/or presumptive diagnosis, (suspected or probable), related to the above clinical findings. Please include clinical findings supporting your diagnosis.     Thank Clemente Burkitt 
Dr. Joi Erazo :  Please clarify if this patient is being treated/managed for:    =>SIRS d/t non-infectious process with acute organ dysfunction ( wbc 12.9 neuts 91% bands 10  tachycardia and tachypnea w/ resp failure) req ivfs, solumedrol, racepinephrine neb, emergent intubation in OR, vent  =>Other Explanation of clinical findings  =>Unable to Determine (no explanation of clinical findings)    The medical record reflects the following clinical findings, treatment, and risk factors:    Risk Factors: 71F adm w/ anaphylactic reaction  Clinical Indicators: H&P--Acute respiratory failure  Secondary to  Chemo related neurotoxicity (oxaliplatin) wbc 12.9 neuts 91% bands 10,  tachycardia   Treatment:  ivfs, solumedrol, racepinephrine neb, emergent intubation in OR, vent    Please clarify and document your clinical opinion in the progress notes and discharge summary including the definitive and/or presumptive diagnosis, (suspected or probable), related to the above clinical findings. Please include clinical findings supporting your diagnosis.     Thank Sade Gonzalez 
- - -

## 2022-11-18 NOTE — PROGRESS NOTES
Oncology Nursing Communication Tool      7:47 PM  4/9/2017     Bedside shift change report given to NEHEMIAH Singleton (incoming nurse) by Meagan Carrillo (outgoing nurse) on Micah Light a 70 y.o. female who was admitted on 4/5/2017  2:26 PM. Report included the following information SBAR, Kardex, Intake/Output, MAR and Recent Results. Significant changes during shift:       Issues for physician to address:             Code Status: Full Code     Infections: No current active infections     Allergies: Beef containing products; Heparin (porcine); Iodinated contrast media - oral and iv dye; Pork/porcine containing products; Augmentin [amoxicillin-pot clavulanate]; Bactrim [sulfamethoprim ds]; Erythromycin; Ciprofloxacin; and Macrobid [nitrofurantoin monohyd/m-cryst]     Current diet:         Pain Controlled [] yes [] no   Bowel Movement [] yes [] no   Last Bowel Movement (date)                Vital Signs:   Patient Vitals for the past 12 hrs:   Temp Pulse Resp BP SpO2   04/09/17 1430 98.6 °F (37 °C) 86 18 99/68 94 %   04/09/17 1200 98.4 °F (36.9 °C) 74 17 112/69 96 %   04/09/17 1100 - 74 14 111/69 98 %   04/09/17 1000 - 68 13 113/65 96 %   04/09/17 0900 - 71 14 115/67 98 %      Intake & Output:     Intake/Output Summary (Last 24 hours) at 04/09/17 1947  Last data filed at 04/09/17 1400   Gross per 24 hour   Intake             1970 ml   Output             1350 ml   Net              620 ml      Laboratory Results:   No results found for this or any previous visit (from the past 12 hour(s)). Opportunity for questions and clarifications were given to the incoming nurse. Patient's bed is in low position, side rails x2, door open PRN, call bell within reach and patient not in distress.       Meagan Carrillo As per daughter, pt fell on Wednesday and yesterday was not able to walk by self ,  had to help her , vomited yesterday, c/o dizziness and pressure in the head and feels more confused

## 2024-06-05 NOTE — PATIENT INSTRUCTIONS
Panitumumab (By injection)   Panitumumab (pan-i-TOOM-ue-mab)  Treats cancer of the colon or rectum. Brand Name(s):Vectibix   There may be other brand names for this medicine. When This Medicine Should Not Be Used: This medicine is not right for everyone. You should not receive it if you had an allergic reaction to panitumumab. How to Use This Medicine:   Injectable  · Your doctor will prescribe your dose and schedule. This medicine is given through a needle placed in a vein. This medicine needs to be given slowly, so the needle will remain in place for at least an hour. · You will receive this medicine while you are in a hospital or cancer treatment center. A nurse or other trained health professional will give you this medicine. · Missed dose: This medicine needs to be given on a fixed schedule. If you miss a dose, call your doctor, home health caregiver, or treatment clinic for instructions. Drugs and Foods to Avoid:      Ask your doctor or pharmacist before using any other medicine, including over-the-counter medicines, vitamins, and herbal products. Warnings While Using This Medicine:   · It is not safe to take this medicine during pregnancy. It could harm an unborn baby. Tell your doctor right away if you become pregnant. Men and women should use an effective form of birth control for 6 months after treatment ends. · Tell your doctor if you are breastfeeding, or if you have kidney disease or lung problems. · Medicines used to treat cancer are very strong and can have many side effects. Before receiving this medicine, make sure you understand all the risks and benefits. It is important for you to work closely with your doctor during your treatment. · This medicine may cause the following problems:  ¨ Infusion reactions  ¨ Lung problems  · This medicine can cause serious skin reactions, and sunlight can make this worse.  Take precautions while you are receiving this medicine and for 2 months after Resting in bed awake. No complaints or needs at present time. AM assessment complete. Alert and oriented. Call light in reach.    Bedside Mobility Assessment Tool (BMAT):     Assessment Level 1- Sit and Shake    1. From a semi-reclined position, ask patient to sit up and rotate to a seated position at the side of the bed. Can use the bedrail.    2. Ask patient to reach out and grab your hand and shake making sure patient reaches across his/her midline.   Pass- Patient is able to come to a seated position, maintain core strength. Maintains seated balance while reaching across midline. Move on to Assessment Level 2.     Assessment Level 2- Stretch and Point   1. With patient in seated position at the side of the bed, have patient place both feet on the floor (or stool) with knees no higher than hips.    2. Ask patient to stretch one leg and straighten the knee, then bend the ankle/flex and point the toes. If appropriate, repeat with the other leg.   Pass- Patient is able to demonstrate appropriate quad strength on intended weight bearing limb(s). Move onto Assessment Level 3.     Assessment Level 3- Stand   1. Ask patient to elevate off the bed or chair (seated to standing) using an assistive device (cane, bedrail).    2. Patient should be able to raise buttocks off be and hold for a count of five. May repeat once.   Pass- Patient maintains standing stability for at least 5 seconds, proceed to assessment level 4.    Assessment Level 4- Walk   1. Ask patient to march in place at bedside.    2. Then ask patient to advance step and return each foot. Some medical conditions may render a patient from stepping backwards, use your best clinical judgement.   Pass- Patient demonstrates balance while shifting weight and ability to step, takes independent steps, does not use assistive device patient is MOBILITY LEVEL 4.      Mobility Level- 4     the last dose. Wear hats and use sunscreen when you are outside. Do not use sunlamps or tanning beds. · Your doctor will do lab tests at regular visits to check on the effects of this medicine. Keep all appointments. Possible Side Effects While Using This Medicine:   Call your doctor right away if you notice any of these side effects:  · Allergic reaction: Itching or hives, swelling in your face or hands, swelling or tingling in your mouth or throat, chest tightness, trouble breathing  · Blistering, peeling, red skin rash  · Cough, shortness of breath  · Decrease in how much or how often you urinate, rapid weight gain, swelling in your hands, ankles, or feet  · Dry, itchy, or cracking skin, acne, fingernail changes or swelling around your nails  · Dry mouth, increased thirst, muscle cramps, uneven heartbeat  · Eye redness or pain, watery eyes, vision changes  · Fever, chills, trouble breathing, chest tightness, faintness  · Severe diarrhea, nausea, or vomiting  · Unusual tiredness or weakness, muscle cramps, confusion  If you notice these less serious side effects, talk with your doctor:   · Mild diarrhea, nausea, or vomiting  If you notice other side effects that you think are caused by this medicine, tell your doctor. Call your doctor for medical advice about side effects. You may report side effects to FDA at 5-493-FDA-1767  © 2016 [address] is for End User's use only and may not be sold, redistributed or otherwise used for commercial purposes. The above information is an  only. It is not intended as medical advice for individual conditions or treatments. Talk to your doctor, nurse or pharmacist before following any medical regimen to see if it is safe and effective for you. 3522

## (undated) DEVICE — TOWEL SURG W17XL27IN STD BLU COT NONFENESTRATED PREWASHED

## (undated) DEVICE — SINGLE BASIN: Brand: CARDINAL HEALTH

## (undated) DEVICE — INTENDED FOR TISSUE SEPARATION, AND OTHER PROCEDURES THAT REQUIRE A SHARP SURGICAL BLADE TO PUNCTURE OR CUT.: Brand: BARD-PARKER ® CARBON RIB-BACK BLADES

## (undated) DEVICE — PACK,EENT,TURBAN DRAPE,PK II: Brand: MEDLINE

## (undated) DEVICE — (D)SYR 10ML 1/5ML GRAD NSAF -- PKGING CHANGE USE ITEM 338027

## (undated) DEVICE — GOWN,SIRUS,FABRNF,XL,20/CS: Brand: MEDLINE

## (undated) DEVICE — HANDLE LT SNAP ON ULT DURABLE LENS FOR TRUMPF ALC DISPOSABLE

## (undated) DEVICE — MEDI-VAC NON-CONDUCTIVE SUCTION TUBING: Brand: CARDINAL HEALTH

## (undated) DEVICE — INFECTION CONTROL KIT SYS

## (undated) DEVICE — INSULATED BLADE ELECTRODE: Brand: EDGE

## (undated) DEVICE — SPONGE: SPECIALTY PEANUT XR 100/CS: Brand: MEDICAL ACTION INDUSTRIES

## (undated) DEVICE — JELLY,LUBE,STERILE,FLIP TOP,TUBE,4-OZ: Brand: MEDLINE

## (undated) DEVICE — STERILE LATEX POWDER-FREE SURGICAL GLOVESWITH NITRILE COATING: Brand: PROTEXIS